# Patient Record
Sex: FEMALE | Race: BLACK OR AFRICAN AMERICAN | NOT HISPANIC OR LATINO | Employment: OTHER | URBAN - METROPOLITAN AREA
[De-identification: names, ages, dates, MRNs, and addresses within clinical notes are randomized per-mention and may not be internally consistent; named-entity substitution may affect disease eponyms.]

---

## 2017-01-27 ENCOUNTER — CLINICAL SUPPORT (OUTPATIENT)
Dept: INTERNAL MEDICINE | Facility: CLINIC | Age: 82
End: 2017-01-27
Payer: MEDICARE

## 2017-01-27 PROCEDURE — 96372 THER/PROPH/DIAG INJ SC/IM: CPT | Mod: PBBFAC

## 2017-01-27 RX ADMIN — CYANOCOBALAMIN 1000 MCG: 1000 INJECTION, SOLUTION INTRAMUSCULAR; SUBCUTANEOUS at 04:01

## 2017-04-06 ENCOUNTER — LAB VISIT (OUTPATIENT)
Dept: LAB | Facility: HOSPITAL | Age: 82
End: 2017-04-06
Attending: INTERNAL MEDICINE
Payer: MEDICARE

## 2017-04-06 ENCOUNTER — OFFICE VISIT (OUTPATIENT)
Dept: INTERNAL MEDICINE | Facility: CLINIC | Age: 82
End: 2017-04-06
Payer: MEDICARE

## 2017-04-06 VITALS
DIASTOLIC BLOOD PRESSURE: 70 MMHG | WEIGHT: 183 LBS | BODY MASS INDEX: 31.24 KG/M2 | HEART RATE: 66 BPM | HEIGHT: 64 IN | SYSTOLIC BLOOD PRESSURE: 122 MMHG | OXYGEN SATURATION: 95 %

## 2017-04-06 DIAGNOSIS — Z12.31 OTHER SCREENING MAMMOGRAM: ICD-10-CM

## 2017-04-06 DIAGNOSIS — I10 ESSENTIAL HYPERTENSION: ICD-10-CM

## 2017-04-06 DIAGNOSIS — E55.9 VITAMIN D DEFICIENCY DISEASE: ICD-10-CM

## 2017-04-06 DIAGNOSIS — M89.9 BONE DISORDER: ICD-10-CM

## 2017-04-06 DIAGNOSIS — E53.8 VITAMIN B12 DEFICIENCY: ICD-10-CM

## 2017-04-06 DIAGNOSIS — E53.8 VITAMIN B12 DEFICIENCY: Primary | ICD-10-CM

## 2017-04-06 DIAGNOSIS — M85.80 OSTEOPENIA, UNSPECIFIED LOCATION: ICD-10-CM

## 2017-04-06 DIAGNOSIS — E78.5 HYPERLIPIDEMIA, UNSPECIFIED HYPERLIPIDEMIA TYPE: ICD-10-CM

## 2017-04-06 DIAGNOSIS — E03.9 HYPOTHYROIDISM, UNSPECIFIED TYPE: ICD-10-CM

## 2017-04-06 DIAGNOSIS — J44.9 CHRONIC OBSTRUCTIVE PULMONARY DISEASE, UNSPECIFIED COPD TYPE: ICD-10-CM

## 2017-04-06 LAB
25(OH)D3+25(OH)D2 SERPL-MCNC: 36 NG/ML
ALBUMIN SERPL BCP-MCNC: 3.5 G/DL
ALP SERPL-CCNC: 85 U/L
ALT SERPL W/O P-5'-P-CCNC: 7 U/L
ANION GAP SERPL CALC-SCNC: 10 MMOL/L
AST SERPL-CCNC: 14 U/L
BASOPHILS # BLD AUTO: 0.03 K/UL
BASOPHILS NFR BLD: 0.8 %
BILIRUB SERPL-MCNC: 1.1 MG/DL
BUN SERPL-MCNC: 12 MG/DL
CALCIUM SERPL-MCNC: 10.1 MG/DL
CHLORIDE SERPL-SCNC: 102 MMOL/L
CHOLEST/HDLC SERPL: 3.8 {RATIO}
CO2 SERPL-SCNC: 32 MMOL/L
CREAT SERPL-MCNC: 1.1 MG/DL
DIFFERENTIAL METHOD: ABNORMAL
EOSINOPHIL # BLD AUTO: 0.2 K/UL
EOSINOPHIL NFR BLD: 4.1 %
ERYTHROCYTE [DISTWIDTH] IN BLOOD BY AUTOMATED COUNT: 14.4 %
EST. GFR  (AFRICAN AMERICAN): 55.2 ML/MIN/1.73 M^2
EST. GFR  (NON AFRICAN AMERICAN): 47.9 ML/MIN/1.73 M^2
GLUCOSE SERPL-MCNC: 80 MG/DL
HCT VFR BLD AUTO: 38.9 %
HDL/CHOLESTEROL RATIO: 26.4 %
HDLC SERPL-MCNC: 288 MG/DL
HDLC SERPL-MCNC: 76 MG/DL
HGB BLD-MCNC: 12.4 G/DL
LDLC SERPL CALC-MCNC: 186.2 MG/DL
LYMPHOCYTES # BLD AUTO: 1.4 K/UL
LYMPHOCYTES NFR BLD: 39.2 %
MCH RBC QN AUTO: 29.2 PG
MCHC RBC AUTO-ENTMCNC: 31.9 %
MCV RBC AUTO: 92 FL
MONOCYTES # BLD AUTO: 0.3 K/UL
MONOCYTES NFR BLD: 9 %
NEUTROPHILS # BLD AUTO: 1.7 K/UL
NEUTROPHILS NFR BLD: 46.6 %
NONHDLC SERPL-MCNC: 212 MG/DL
PLATELET # BLD AUTO: 223 K/UL
PMV BLD AUTO: 9.2 FL
POTASSIUM SERPL-SCNC: 4.1 MMOL/L
PROT SERPL-MCNC: 7.8 G/DL
RBC # BLD AUTO: 4.25 M/UL
SODIUM SERPL-SCNC: 144 MMOL/L
T4 FREE SERPL-MCNC: 0.84 NG/DL
TRIGL SERPL-MCNC: 129 MG/DL
TSH SERPL DL<=0.005 MIU/L-ACNC: 49.25 UIU/ML
VIT B12 SERPL-MCNC: >2000 PG/ML
WBC # BLD AUTO: 3.65 K/UL

## 2017-04-06 PROCEDURE — 85025 COMPLETE CBC W/AUTO DIFF WBC: CPT

## 2017-04-06 PROCEDURE — 80061 LIPID PANEL: CPT

## 2017-04-06 PROCEDURE — 99214 OFFICE O/P EST MOD 30 MIN: CPT | Mod: S$PBB,,, | Performed by: INTERNAL MEDICINE

## 2017-04-06 PROCEDURE — 99999 PR PBB SHADOW E&M-EST. PATIENT-LVL III: CPT | Mod: PBBFAC,,, | Performed by: INTERNAL MEDICINE

## 2017-04-06 PROCEDURE — 84439 ASSAY OF FREE THYROXINE: CPT

## 2017-04-06 PROCEDURE — 82306 VITAMIN D 25 HYDROXY: CPT

## 2017-04-06 PROCEDURE — 80053 COMPREHEN METABOLIC PANEL: CPT

## 2017-04-06 PROCEDURE — 82607 VITAMIN B-12: CPT

## 2017-04-06 PROCEDURE — 36415 COLL VENOUS BLD VENIPUNCTURE: CPT

## 2017-04-06 PROCEDURE — 84443 ASSAY THYROID STIM HORMONE: CPT

## 2017-04-06 RX ORDER — CYANOCOBALAMIN 1000 UG/ML
1000 INJECTION, SOLUTION INTRAMUSCULAR; SUBCUTANEOUS
Status: DISCONTINUED | OUTPATIENT
Start: 2017-04-06 | End: 2017-09-07 | Stop reason: ALTCHOICE

## 2017-04-06 RX ADMIN — CYANOCOBALAMIN 1000 MCG: 1000 INJECTION, SOLUTION INTRAMUSCULAR at 02:04

## 2017-04-06 NOTE — MR AVS SNAPSHOT
[facility]  [address]  [place] [address]  Phone: [phone]  Fax: [phone]                  Michelle Campo   2017 1:00 PM   Office Visit    Description:  Female : 1935   Provider:  Lien Griffith MD   Department:  [facility]           Reason for Visit     Follow-up           Diagnoses this Visit        Comments    Vitamin B12 deficiency    -  Primary     Vitamin D deficiency disease         Essential hypertension         Other screening mammogram         Osteopenia, unspecified location         Bone disorder                To Do List           Future Appointments        Provider Department Dept Phone    2017 1:00 PM PERIMETRY, HUMPH [facility] [phone]    2017 1:15 PM Brenda Ceja MD [facility] [phone]    2017 1:40 PM NOMC, DEXA1 [facility] [phone]    2017 2:30 PM LAB, APPOINTMENT [place] [facility] [phone]    2017 1:30 PM Lien Griffith MD [facility] [phone]      Goals (5 Years of Data)     None      [facility]     [facility] Nurse Care [facility] -  Assistance  Unless otherwise directed by your provider, please contact [facility], our nurse care line that is available for  assistance.     Registered nurses in the [facility] Center provide: appointment scheduling, clinical advisement, health education, and other advisory services.  Call: [phone] (toll free)               Medications           Message regarding Medications     Verify the changes and/or additions to your medication regime listed below are the same as discussed with your clinician today.  If any of these changes or additions are incorrect, please notify your healthcare provider.        These medications were administered today        Dose Freq    cyanocobalamin injection 1,000 mcg 1,000 mcg Every 30  "days    Sig: Inject 1 mL (1,000 mcg total) into the muscle every 30 days.    Class: Normal    Route: Intramuscular      STOP taking these medications     cyanocobalamin injection 1,000 mcg            Verify that the below list of medications is an accurate representation of the medications you are currently taking.  If none reported, the list may be blank. If incorrect, please contact your healthcare provider. Carry this list with you in case of emergency.           Current Medications     acetaminophen (TYLENOL EXTRA STRENGTH) 500 MG tablet Take 500 mg by mouth every 6 (six) hours as needed.    brimonidine-timolol (COMBIGAN) 0.2-0.5 % Drop Place 1 drop into both eyes 2 (two) times daily. 1 month supply = 10 ml bottle. This replaces the timolol drops    brinzolamide (AZOPT) 1 % ophthalmic suspension Place 1 drop into both eyes 3 (three) times daily.    cholecalciferol, vitamin D3, (VITAMIN D3) 2,000 unit Tab Take 1 tablet (2,000 Units total) by mouth once daily.    donepezil (ARICEPT) 10 MG tablet Take 1 tablet (10 mg total) by mouth once daily.    latanoprost 0.005 % ophthalmic solution Place 1 drop into both eyes every evening.    levothyroxine (SYNTHROID) 88 MCG tablet Take 1 tablet (88 mcg total) by mouth once daily.    lisinopril-hydrochlorothiazide (PRINZIDE,ZESTORETIC) 20-25 mg Tab Take 1 tablet by mouth once daily.    pravastatin (PRAVACHOL) 20 MG tablet Take 1 tablet (20 mg total) by mouth once daily.           Clinical Reference Information           Your Vitals Were     BP Pulse Height Weight SpO2 BMI    122/70 66 5' 4" (1.626 m) 83 kg (182 lb 15.7 oz) 95% 31.41 kg/m2      Blood Pressure          Most Recent Value    BP  122/70      Allergies as of 4/6/2017     Pcn [Penicillins]      Immunizations Administered on Date of Encounter - 4/6/2017     None      Orders Placed During Today's Visit     Future Labs/Procedures Expected by Expires    DXA Bone Density Spine And Hip  4/6/2017 4/6/2018    Mammo Digital " Screening Bilat with CAD  4/6/2017 6/6/2018    Vitamin B12  4/6/2017 6/5/2018    CBC auto differential  As directed 4/6/2018    CBC auto differential  As directed 4/6/2018    Comprehensive metabolic panel  As directed 4/6/2018    Comprehensive metabolic panel  As directed 4/6/2018    Lipid panel  As directed 4/6/2018    TSH  As directed 4/6/2018    TSH  As directed 4/6/2018    Vitamin D  As directed 4/6/2018      Language Assistance Services     ATTENTION: Language assistance services are available, free of charge. Please call [phone].      ATENCIÓN: Si habla español, tiene a dubois disposición servicios gratuitos de asistencia lingüística. Llame al [phone].     CHÚ Ý: N?u b?n nói Ti?ng Vi?t, có các d?ch v? h? tr? ngôn ng? mi?n phí dành cho b?n. G?i s? [phone].         [facility] complies with applicable Federal civil rights laws and does not discriminate on the basis of race, color, national origin, age, disability, or sex.

## 2017-04-06 NOTE — PROGRESS NOTES
"CHIEF COMPLAINT: Follow up of hypertension, hyperlipidemia, osteoporosis, and hypothyroidism.     HISTORY OF PRESENT ILLNESS: This is a 81-year-old woman who presents with her daughter for above. She has been doing well. She is taking lisinopril HCT 20/25 one tablet daily. She denies any chest pain, shortness of breath, dizziness, blurred vision, headache, joint pain or muscle pain from the pravastatin 20 mg daily. She has been on levothyroxine 88 mcg daily. She denies any fatigue. She denies any fever, chills, nausea, vomiting, constipation, diarrhea. Vision is good. Appetite has been good. She denies any dysuria, hematuria, anxiety, depression, insomnia. Memory is ok Aricept 10 mg daily. HEr daughter states she has some memory loss which is stable on the aricept. She is also taking Vitamin D 2000 units daily. She has all of her medication bottles with her today    PAST MEDICAL HISTORY:   1. Hypertension.   2. Hyperlipidemia.   3. Status post stroke November 2001, hospitalized at [facility], had left facial drop and left-sided weakness which have since resolved.   4. COPD.   5. Spontaneous right pneumothorax April 2007, resolved with chest tube.   6. Shingles in 2007.   7. History of elevated blood sugars.   8. Glaucoma, followed by Dr. Rees.   9. Hyperthyroidism, status post radioactive iodine 10/2/2008.   10. Reflux.     PAST SURGICAL HISTORY: Hysterectomy. She had her ovaries removed.     MEDICATIONS AND ALLERGIES: Updated in epic     PHYSICAL EXAMINATION:      /70  Pulse 66  Ht 5' 4" (1.626 m)  Wt 83 kg (182 lb 15.7 oz)  SpO2 95%  BMI 31.41 kg/m2    GENERAL: Alert, oriented. No apparent distress. Affect within normal   limits.   CONJUNCTIVAE: Anicteric.   TYMPANIC MEMBRANES: Clear.   OROPHARYNX: Clear.   NECK: Supple.   RESPIRATORY: Effort normal.   LUNGS: Clear to auscultation.   HEART: Regular rate and rhythm without murmurs, gallops, or rubs.   No lower extremity edema.         ASSESSMENT AND PLAN: "   1. Hypertension-stable  2. Hyperlipidemia-on pravastatin  3. Hypothyroidism-on synthroid  4. Osteopenia- 3/2013 - due 2017  5. History of stroke-modifying risk factors.   6. History of reflux-asymptomatic.   7. Glaucoma-to ophtho  8. Screening mammogram 7/2016. Bone density 3/2013 due 2017. Colonscopy 9/10 - diverticulosis.   9. History of right-sided pneumothorax-chest x-ray 2/12 ok.  10.Mild anemia -stable   11. Memory issues - better on Aricept  12. COPD - asymptomatic  13. Vitamin D deficiency - vitamin D 2000 units daily  14. History of diabetes - a1c stable      Prevnar 7/16    I will see her back in 3 months , sooner if problems arise.

## 2017-04-17 ENCOUNTER — TELEPHONE (OUTPATIENT)
Dept: INTERNAL MEDICINE | Facility: CLINIC | Age: 82
End: 2017-04-17

## 2017-04-17 NOTE — TELEPHONE ENCOUNTER
Pt notified of results and PCP recommendations, Called daughter and left Vm requesting a return call:    Please notify family that her thyroid function and choleserol levels are high. Family needs to make sure she is taking her medicaiton regularly   SF

## 2017-04-18 ENCOUNTER — CLINICAL SUPPORT (OUTPATIENT)
Dept: OPHTHALMOLOGY | Facility: CLINIC | Age: 82
End: 2017-04-18
Attending: OPHTHALMOLOGY
Payer: MEDICARE

## 2017-04-18 DIAGNOSIS — H52.7 REFRACTION ERROR: ICD-10-CM

## 2017-04-18 DIAGNOSIS — H52.4 PRESBYOPIA: ICD-10-CM

## 2017-04-18 DIAGNOSIS — H40.1133 PRIMARY OPEN ANGLE GLAUCOMA OF BOTH EYES, SEVERE STAGE: Primary | ICD-10-CM

## 2017-04-18 DIAGNOSIS — H40.1133 PRIMARY OPEN ANGLE GLAUCOMA OF BOTH EYES, SEVERE STAGE: ICD-10-CM

## 2017-04-18 DIAGNOSIS — Z96.1 PSEUDOPHAKIA: ICD-10-CM

## 2017-04-18 DIAGNOSIS — H04.123 DRY EYES, BILATERAL: ICD-10-CM

## 2017-04-18 PROCEDURE — 99999 PR PBB SHADOW E&M-EST. PATIENT-LVL II: CPT | Mod: PBBFAC,,, | Performed by: OPHTHALMOLOGY

## 2017-04-18 PROCEDURE — 92134 CPTRZ OPH DX IMG PST SGM RTA: CPT | Mod: 26,S$PBB,, | Performed by: OPHTHALMOLOGY

## 2017-04-18 PROCEDURE — 92012 INTRM OPH EXAM EST PATIENT: CPT | Mod: S$PBB,,, | Performed by: OPHTHALMOLOGY

## 2017-04-18 PROCEDURE — 99212 OFFICE O/P EST SF 10 MIN: CPT | Mod: PBBFAC | Performed by: OPHTHALMOLOGY

## 2017-04-18 RX ORDER — LATANOPROST 50 UG/ML
1 SOLUTION/ DROPS OPHTHALMIC NIGHTLY
Qty: 1 BOTTLE | Refills: 12 | Status: SHIPPED | OUTPATIENT
Start: 2017-04-18 | End: 2018-03-20 | Stop reason: SDUPTHER

## 2017-04-18 RX ORDER — BRIMONIDINE TARTRATE AND TIMOLOL MALEATE 2; 5 MG/ML; MG/ML
1 SOLUTION OPHTHALMIC 2 TIMES DAILY
Qty: 10 ML | Refills: 12 | Status: SHIPPED | OUTPATIENT
Start: 2017-04-18 | End: 2018-03-20 | Stop reason: SDUPTHER

## 2017-04-18 NOTE — PROGRESS NOTES
Assessment /Plan     For exam results, see Encounter Report.    Primary open angle glaucoma of both eyes, severe stage    Dry eyes, bilateral    Refraction error    Presbyopia - Both Eyes    Pseudophakia - Both Eyes      LOST TO F/U X 2 YEARS 8/2014 TO 8/2016     1.   POAG OD > OS - severe stage   Pt Dx with glaucoma by Dr. Carias   First HVF 2010   First photos 2010   First [facility] visit 2010   H/O poor compliance lost to F/U 12/2010 to 3/2013           Family history    neg        Glaucoma meds    Timolol ou, xalatan ou        H/O adverse rxn to glaucoma drops    none        LASERS    none        GLAUCOMA SURGERIES    none        OTHER EYE SURGERIES    PC IOL - sulcus OD // bag OS - Crear        CDR    0.95 w/ sup and inf loss /0.8 with inf loss         Tbase    20/20        Tmax    20/20        Ttarget    16/16           HVF    3 test 2010 to 2016 -  gen dep - SAD/IAD od. / gen dep SALT os ++ progression OU        Gonio    +3 ou        CCT    502/501 (thin ou)        OCT    3 test 2010 to 2016  - RNFL - dec I od / nl os        HRT    2 test 2013 to 2017 - [place] off od // dec inf os /// CDR xx [place] off od //0.72 os        Disc photos    2010. 2016  - OIS     - Ttoday  18/17  - Test done today  HRT/Gonio    2.    NAOMI - SPK ou   AT's prn    3.   PC IOL ou - outside doctor - Crear   Sulcus od   Bag os    4.   HTN - ? Hx of retinopathy. BP control     5.   Myopia / presbyopia (monovision IOL's)   Glasses - Martinez - 3/13/2013   -3.00 od and plano os    PLAN:  Mild improvement in IOP w/ restarting gtts <1 month ago  IOP too high for CDR and VF loss and baseline IOP of 20  Changed combigan ou- re-start - ran out   Cont latanoprost ou   Cont Azopt  TID OU     Again encouraged compliance for glaucoma management and treatment  Consider SLT OU in future    RTC: 2-3 mos gonio and  IOP check back on combigan // if IOP still higher than ideal - consider SLT ou    I have seen and personally examined the patient.  I  agree with the findings, assessment and plan of the resident and/or fellow.     Brenda Ceja MD

## 2017-04-18 NOTE — TELEPHONE ENCOUNTER
Let daughter know, she is to continue levothyroxine 88 mcg one tablet daily, Except twice a week she is to take 1.5 tablets

## 2017-04-20 ENCOUNTER — TELEPHONE (OUTPATIENT)
Dept: INTERNAL MEDICINE | Facility: CLINIC | Age: 82
End: 2017-04-20

## 2017-07-02 RX ORDER — LEVOTHYROXINE SODIUM 88 UG/1
TABLET ORAL
Qty: 90 TABLET | Refills: 0 | Status: SHIPPED | OUTPATIENT
Start: 2017-07-02 | End: 2017-08-04 | Stop reason: SDUPTHER

## 2017-07-02 RX ORDER — LISINOPRIL AND HYDROCHLOROTHIAZIDE 20; 25 MG/1; MG/1
TABLET ORAL
Qty: 90 TABLET | Refills: 0 | Status: SHIPPED | OUTPATIENT
Start: 2017-07-02 | End: 2017-08-04 | Stop reason: SDUPTHER

## 2017-07-18 ENCOUNTER — OFFICE VISIT (OUTPATIENT)
Dept: OPHTHALMOLOGY | Facility: CLINIC | Age: 82
End: 2017-07-18
Payer: MEDICARE

## 2017-07-18 DIAGNOSIS — Z96.1 PSEUDOPHAKIA: ICD-10-CM

## 2017-07-18 DIAGNOSIS — I10 ESSENTIAL HYPERTENSION: ICD-10-CM

## 2017-07-18 DIAGNOSIS — H52.4 PRESBYOPIA: ICD-10-CM

## 2017-07-18 DIAGNOSIS — H04.123 DRY EYES, BILATERAL: ICD-10-CM

## 2017-07-18 DIAGNOSIS — H40.1133 PRIMARY OPEN ANGLE GLAUCOMA OF BOTH EYES, SEVERE STAGE: Primary | ICD-10-CM

## 2017-07-18 PROCEDURE — 92012 INTRM OPH EXAM EST PATIENT: CPT | Mod: S$PBB,,, | Performed by: OPHTHALMOLOGY

## 2017-07-18 PROCEDURE — 99211 OFF/OP EST MAY X REQ PHY/QHP: CPT | Mod: PBBFAC | Performed by: OPHTHALMOLOGY

## 2017-07-18 PROCEDURE — 99999 PR PBB SHADOW E&M-EST. PATIENT-LVL I: CPT | Mod: PBBFAC,,, | Performed by: OPHTHALMOLOGY

## 2017-07-18 NOTE — PROGRESS NOTES
HPI     DLS: 4/18/17    Pt here for 3 month check;  Pt states OD runs water a lot.     Meds: Brimonidine bid ou            Azopt tid ou            Latanoprost qhs ou    1. Primary open angle glaucoma of both eyes, severe stage  2. Dry eyes, bilateral  3. Refraction error  4. Presbyopia, both eyes  5. Pseudophakia, both eyes     Last edited by Nancy De Leon on 7/18/2017  1:09 PM. (History)            Assessment /Plan     For exam results, see Encounter Report.    Primary open angle glaucoma of both eyes, severe stage    Dry eyes, bilateral    Essential hypertension    Presbyopia - Both Eyes    Pseudophakia - Both Eyes        LOST TO F/U X 2 YEARS 8/2014 TO 8/2016     1.   POAG OD > OS - severe stage   Pt Dx with glaucoma by Dr. Carias   First HVF 2010   First photos 2010   First [facility] visit 2010   H/O poor compliance lost to F/U 12/2010 to 3/2013           Family history    neg        Glaucoma meds    Timolol ou, xalatan ou        H/O adverse rxn to glaucoma drops    none        LASERS    none        GLAUCOMA SURGERIES    none        OTHER EYE SURGERIES    PC IOL - sulcus OD // bag OS - Crear        CDR    0.95 w/ sup and inf loss /0.8 with inf loss         Tbase    20/20        Tmax    20/20        Ttarget    16/16           HVF    3 test 2010 to 2016 -  gen dep - SAD/IAD od. / gen dep SALT os ++ progression OU        Gonio    +3 ou        CCT    502/501 (thin ou)        OCT    3 test 2010 to 2016  - RNFL - dec I od / nl os        HRT    2 test 2013 to 2017 - [place] off od // dec inf os /// CDR xx [place] off od //0.72 os        Disc photos    2010. 2016  - OIS     - Ttoday  19/19  - Test done today - Gonio    2.    NAOMI - SPK ou   AT's prn    3.   PC IOL ou - outside doctor - Crear   Sulcus od   Bag os    4.   HTN - ? Hx of retinopathy. BP control     5.   Myopia / presbyopia (monovision IOL's)   Glasses - Martinez - 3/13/2013   -3.00 od and plano os    PLAN:  Mild improvement in IOP w/ restarting gtts <1 month  ago  IOP too high for CDR and VF loss and baseline IOP of 20  Changed combigan ou- re-start - ran out   Cont latanoprost ou   Cont Azopt  TID OU     Again encouraged compliance for glaucoma management and treatment  Consider SLT OU in future    RTC:  consider SLT OD then OS     I have seen and personally examined the patient.  I agree with the findings, assessment and plan of the resident and/or fellow.     Brenda Ceja MD

## 2017-07-28 ENCOUNTER — HOSPITAL ENCOUNTER (OUTPATIENT)
Dept: RADIOLOGY | Facility: CLINIC | Age: 82
Discharge: HOME | End: 2017-07-28
Attending: INTERNAL MEDICINE
Payer: MEDICARE

## 2017-07-28 DIAGNOSIS — M89.9 BONE DISORDER: ICD-10-CM

## 2017-07-28 PROCEDURE — 77080 DXA BONE DENSITY AXIAL: CPT | Mod: TC

## 2017-07-28 PROCEDURE — 77080 DXA BONE DENSITY AXIAL: CPT | Mod: 26,,, | Performed by: INTERNAL MEDICINE

## 2017-08-04 ENCOUNTER — HOSPITAL ENCOUNTER (OUTPATIENT)
Dept: RADIOLOGY | Facility: HOSPITAL | Age: 82
Discharge: HOME | End: 2017-08-04
Attending: INTERNAL MEDICINE
Payer: MEDICARE

## 2017-08-04 ENCOUNTER — OFFICE VISIT (OUTPATIENT)
Dept: INTERNAL MEDICINE | Facility: CLINIC | Age: 82
End: 2017-08-04
Payer: MEDICARE

## 2017-08-04 VITALS
HEART RATE: 80 BPM | HEIGHT: 64 IN | DIASTOLIC BLOOD PRESSURE: 78 MMHG | SYSTOLIC BLOOD PRESSURE: 139 MMHG | BODY MASS INDEX: 30.61 KG/M2 | WEIGHT: 179.31 LBS

## 2017-08-04 DIAGNOSIS — M85.80 OSTEOPENIA, UNSPECIFIED LOCATION: ICD-10-CM

## 2017-08-04 DIAGNOSIS — Z12.31 OTHER SCREENING MAMMOGRAM: ICD-10-CM

## 2017-08-04 DIAGNOSIS — E78.5 HYPERLIPIDEMIA, UNSPECIFIED HYPERLIPIDEMIA TYPE: ICD-10-CM

## 2017-08-04 DIAGNOSIS — E03.9 HYPOTHYROIDISM, UNSPECIFIED TYPE: Primary | ICD-10-CM

## 2017-08-04 DIAGNOSIS — I10 ESSENTIAL HYPERTENSION: ICD-10-CM

## 2017-08-04 PROCEDURE — 77067 SCR MAMMO BI INCL CAD: CPT | Mod: TC

## 2017-08-04 PROCEDURE — 3008F BODY MASS INDEX DOCD: CPT | Mod: ,,, | Performed by: INTERNAL MEDICINE

## 2017-08-04 PROCEDURE — 1159F MED LIST DOCD IN RCRD: CPT | Mod: ,,, | Performed by: INTERNAL MEDICINE

## 2017-08-04 PROCEDURE — 77067 SCR MAMMO BI INCL CAD: CPT | Mod: 26,,, | Performed by: RADIOLOGY

## 2017-08-04 PROCEDURE — 1126F AMNT PAIN NOTED NONE PRSNT: CPT | Mod: ,,, | Performed by: INTERNAL MEDICINE

## 2017-08-04 PROCEDURE — 99999 PR PBB SHADOW E&M-EST. PATIENT-LVL III: CPT | Mod: PBBFAC,,, | Performed by: INTERNAL MEDICINE

## 2017-08-04 PROCEDURE — 99214 OFFICE O/P EST MOD 30 MIN: CPT | Mod: S$PBB,,, | Performed by: INTERNAL MEDICINE

## 2017-08-04 RX ORDER — LEVOTHYROXINE SODIUM 100 UG/1
100 TABLET ORAL DAILY
Qty: 90 TABLET | Refills: 4 | Status: SHIPPED | OUTPATIENT
Start: 2017-08-04 | End: 2017-12-06 | Stop reason: SDUPTHER

## 2017-08-04 RX ORDER — LISINOPRIL AND HYDROCHLOROTHIAZIDE 20; 25 MG/1; MG/1
1 TABLET ORAL DAILY
Qty: 90 TABLET | Refills: 0 | Status: SHIPPED | OUTPATIENT
Start: 2017-08-04 | End: 2018-05-17 | Stop reason: SDUPTHER

## 2017-08-04 RX ORDER — PRAVASTATIN SODIUM 20 MG/1
20 TABLET ORAL DAILY
Qty: 90 TABLET | Refills: 4 | Status: SHIPPED | OUTPATIENT
Start: 2017-08-04 | End: 2018-10-15 | Stop reason: SDUPTHER

## 2017-08-04 RX ORDER — DONEPEZIL HYDROCHLORIDE 10 MG/1
10 TABLET, FILM COATED ORAL DAILY
Qty: 90 TABLET | Refills: 4 | Status: SHIPPED | OUTPATIENT
Start: 2017-08-04 | End: 2018-10-31 | Stop reason: SDUPTHER

## 2017-08-04 NOTE — PROGRESS NOTES
"CHIEF COMPLAINT: Follow up of hypertension, hyperlipidemia, osteoporosis, and hypothyroidism.     HISTORY OF PRESENT ILLNESS: This is a 82-year-old woman who presents with her daughter for above. She has been doing well. She is taking lisinopril HCT 20/25 one tablet daily. She denies any chest pain, shortness of breath, dizziness, blurred vision, headache, joint pain or muscle pain from the pravastatin 20 mg daily. She has been on levothyroxine 88 mcg daily. She denies any fatigue. She denies any fever, chills, nausea, vomiting, constipation, diarrhea. Vision is good. Appetite has been good. She denies any dysuria, hematuria, anxiety, depression, insomnia. Memory is ok Aricept 10 mg daily. HEr daughter states she has some memory loss which is stable on the aricept. She is also taking Vitamin D 2000 units daily. She has all of her medication bottles with her today    PAST MEDICAL HISTORY:   1. Hypertension.   2. Hyperlipidemia.   3. Status post stroke November 2001, hospitalized at [facility], had left facial drop and left-sided weakness which have since resolved.   4. COPD.   5. Spontaneous right pneumothorax April 2007, resolved with chest tube.   6. Shingles in 2007.   7. History of elevated blood sugars.   8. Glaucoma, followed by Dr. Rees.   9. Hyperthyroidism, status post radioactive iodine 10/2/2008.   10. Reflux.     PAST SURGICAL HISTORY: Hysterectomy. She had her ovaries removed.     MEDICATIONS AND ALLERGIES: Updated in epic     PHYSICAL EXAMINATION:     /78 (BP Location: Right arm, Patient Position: Sitting, BP Method: Manual)   Pulse 80   Ht 5' 4" (1.626 m)   Wt 81.3 kg (179 lb 4.8 oz)   BMI 30.78 kg/m²   GENERAL: Alert, oriented. No apparent distress. Affect within normal   limits.   CONJUNCTIVAE: Anicteric.   TYMPANIC MEMBRANES: Clear.   OROPHARYNX: Clear.   NECK: Supple.   RESPIRATORY: Effort normal.   LUNGS: Clear to auscultation.   HEART: Regular rate and rhythm without murmurs, gallops, or " rubs.   No lower extremity edema.    ABDOMEN: soft, non distended, non tender, bowel sounds present, no hepatosplenomgaly  BREAST: well healed keloid scar on right breast, no nodules palpated, no axillary lad       Labs 7/17 reviewed      ASSESSMENT AND PLAN:   1. Hypertension-stable  2. Hyperlipidemia-on pravastatin  3. Hypothyroidism-increase synthroid to 100 mcg daily  4. Osteopenia- done 7/ 2017  5. History of stroke-modifying risk factors.   6. History of reflux-asymptomatic.   7. Glaucoma-on drops  8. Screening mammogram 7/2016 - today. Bone density 3/2013 due 2017. Colonscopy 9/10 - diverticulosis.   9. History of right-sided pneumothorax-chest x-ray 2/12 ok.  10.Mild anemia -stable   11. Memory issues - better on Aricept  12. COPD - asymptomatic  13. Vitamin D deficiency - vitamin D 2000 units daily  14. History of diabetes - a1c stable      Prevnar 7/16    I will see her back in 4 months , sooner if problems arise.

## 2017-08-17 ENCOUNTER — OFFICE VISIT (OUTPATIENT)
Dept: OPHTHALMOLOGY | Facility: CLINIC | Age: 82
End: 2017-08-17
Payer: MEDICARE

## 2017-08-17 VITALS — HEART RATE: 77 BPM | SYSTOLIC BLOOD PRESSURE: 166 MMHG | DIASTOLIC BLOOD PRESSURE: 84 MMHG

## 2017-08-17 DIAGNOSIS — H04.123 DRY EYES, BILATERAL: ICD-10-CM

## 2017-08-17 DIAGNOSIS — Z96.1 PSEUDOPHAKIA: ICD-10-CM

## 2017-08-17 DIAGNOSIS — I10 ESSENTIAL HYPERTENSION: ICD-10-CM

## 2017-08-17 DIAGNOSIS — H40.1133 PRIMARY OPEN ANGLE GLAUCOMA OF BOTH EYES, SEVERE STAGE: Primary | ICD-10-CM

## 2017-08-17 DIAGNOSIS — H52.4 PRESBYOPIA: ICD-10-CM

## 2017-08-17 PROCEDURE — 99999 PR PBB SHADOW E&M-EST. PATIENT-LVL II: CPT | Mod: PBBFAC,,, | Performed by: OPHTHALMOLOGY

## 2017-08-17 PROCEDURE — 99212 OFFICE O/P EST SF 10 MIN: CPT | Mod: PBBFAC | Performed by: OPHTHALMOLOGY

## 2017-08-17 PROCEDURE — 99499 UNLISTED E&M SERVICE: CPT | Mod: S$PBB,,, | Performed by: OPHTHALMOLOGY

## 2017-08-17 PROCEDURE — 65855 TRABECULOPLASTY LASER SURG: CPT | Mod: PBBFAC,RT | Performed by: OPHTHALMOLOGY

## 2017-08-17 NOTE — PROGRESS NOTES
HPI     Laser Treatment    Additional comments: laser pi od           Comments   LASER PI OD TODAY (argon then yag)    1. POAG OD>OS  2. NAOMI  3. PCIOL OU  4. HTN Retinopathy  5. Myopia    MEDS:  Combigan BID OU  Azopt TID OU  Latanoprost QHS OU       Last edited by Malika Levi MA on 8/17/2017 10:39 AM. (History)            Assessment /Plan     For exam results, see Encounter Report.    There are no diagnoses linked to this encounter.  HPI     Laser Treatment    Additional comments: laser pi od           Comments   LASER PI OD TODAY (argon then yag)    1. POAG OD>OS  2. NAOMI  3. PCIOL OU  4. HTN Retinopathy  5. Myopia    MEDS:  Combigan BID OU  Azopt TID OU  Latanoprost QHS OU       Last edited by Malika Levi MA on 8/17/2017 10:39 AM. (History)            Assessment /Plan     For exam results, see Encounter Report.    There are no diagnoses linked to this encounter.    LOST TO F/U X 2 YEARS 8/2014 TO 8/2016     1.   POAG OD > OS - severe stage   Pt Dx with glaucoma by Dr. Carias   First F 2010   First photos 2010   First [facility] eye visit 2010   H/O poor compliance lost to F/U 12/2010 to 3/2013           Family history    neg        Glaucoma meds    Timolol ou, xalatan ou        H/O adverse rxn to glaucoma drops    none        LASERS    none        GLAUCOMA SURGERIES    none        OTHER EYE SURGERIES    PC IOL - sulcus OD // bag OS - [place]        CDR    0.95 w/ sup and inf loss /0.8 with inf loss         Tbase    20/20        Tmax    20/20        Ttarget    16/16           HVF    3 test 2010 to 2016 -  gen dep - SAD/IAD od. / gen dep SALT os ++ progression OU        Gonio    +3 ou        CCT    502/501 (thin ou)        OCT    3 test 2010 to 2016  - RNFL - dec I od / nl os        HRT    2 test 2013 to 2017 - [place] off od // dec inf os /// CDR xx [place] off od //0.72 os        Disc photos    2010. 2016  - OIS     - Ttoday  18/17  - Test done today - SLT OD    2.    NAOMI - SPK ou   AT's prn    3.   PC IOL ou -  outside doctor - Crear   Sulcus od   Bag os    4.   HTN - ? Hx of retinopathy. BP control     5.   Myopia / presbyopia (monovision IOL's)   Glasses - Martinez - 3/13/2013   -3.00 od and plano os    PLAN:  Mild improvement in IOP w/ restarting gtts <1 month ago  IOP too high for CDR and VF loss and baseline IOP of 20  Changed combigan ou- re-start - ran out   Cont latanoprost ou   Cont Azopt  TID OU     Again encouraged compliance for glaucoma management and treatment      SLT OD 8/17/2017 - steroid taper - Pred Forte    -  SLT OS - pending -           I have seen and personally examined the patient.  I agree with the findings, assessment and plan of the resident and/or fellow.     Brenda Ceja MD

## 2017-09-07 ENCOUNTER — OFFICE VISIT (OUTPATIENT)
Dept: OPHTHALMOLOGY | Facility: CLINIC | Age: 82
End: 2017-09-07
Payer: MEDICARE

## 2017-09-07 VITALS — DIASTOLIC BLOOD PRESSURE: 77 MMHG | SYSTOLIC BLOOD PRESSURE: 129 MMHG | HEART RATE: 82 BPM

## 2017-09-07 DIAGNOSIS — H40.1133 PRIMARY OPEN ANGLE GLAUCOMA OF BOTH EYES, SEVERE STAGE: Primary | ICD-10-CM

## 2017-09-07 DIAGNOSIS — Z96.1 PSEUDOPHAKIA: ICD-10-CM

## 2017-09-07 DIAGNOSIS — H52.4 PRESBYOPIA: ICD-10-CM

## 2017-09-07 DIAGNOSIS — H04.123 DRY EYES, BILATERAL: ICD-10-CM

## 2017-09-07 DIAGNOSIS — I10 ESSENTIAL HYPERTENSION: ICD-10-CM

## 2017-09-07 PROCEDURE — 65855 TRABECULOPLASTY LASER SURG: CPT | Mod: PBBFAC,LT | Performed by: OPHTHALMOLOGY

## 2017-09-07 PROCEDURE — 99999 PR PBB SHADOW E&M-EST. PATIENT-LVL II: CPT | Mod: PBBFAC,,, | Performed by: OPHTHALMOLOGY

## 2017-09-07 PROCEDURE — 99499 UNLISTED E&M SERVICE: CPT | Mod: S$PBB,,, | Performed by: OPHTHALMOLOGY

## 2017-09-07 PROCEDURE — 99212 OFFICE O/P EST SF 10 MIN: CPT | Mod: PBBFAC | Performed by: OPHTHALMOLOGY

## 2017-09-07 NOTE — PROGRESS NOTES
HPI     Laser Treatment    Additional comments: slt os           Comments   SLT OS TODAY  S/p SLT OD 8/17/17    1. POAG OD>OS  2. NAOMI  3. PCIOL OU  4. HTN Retinopathy  5. Myopia    MEDS:  Combigan BID OU  Azopt TID OU  Latanoprost QHS OU       Last edited by Malika Levi MA on 9/7/2017  9:54 AM. (History)            Assessment /Plan     For exam results, see Encounter Report.    Primary open angle glaucoma of both eyes, severe stage  -     Argon Trabeculoplasty - OS - Left Eye    Dry eyes, bilateral    Essential hypertension    Presbyopia - Both Eyes    Pseudophakia - Both Eyes      LOST TO F/U X 2 YEARS 8/2014 TO 8/2016     1.   POAG OD > OS - severe stage   Pt Dx with glaucoma by Dr. Carias   First HVF 2010   First photos 2010   First [facility] eye visit 2010   H/O poor compliance lost to F/U 12/2010 to 3/2013           Family history    neg        Glaucoma meds    Timolol ou, xalatan ou        H/O adverse rxn to glaucoma drops    none        LASERS    SLT od 8/17/2017 (18-->15)   // SLT os 9/7/2017         GLAUCOMA SURGERIES    none        OTHER EYE SURGERIES    PC IOL - sulcus OD // bag OS - Crear        CDR    0.95 w/ sup and inf loss /0.8 with inf loss         Tbase    20/20        Tmax    20/20        Ttarget    16/16           HVF    3 test 2010 to 2016 -  gen dep - SAD/IAD od. / gen dep SALT os ++ progression OU        Gonio    +3 ou        CCT    502/501 (thin ou)        OCT    3 test 2010 to 2016  - RNFL - dec I od / nl os        HRT    2 test 2013 to 2017 - [place] off od // dec inf os /// CDR xx [place] off od //0.72 os        Disc photos    2010. 2016  - OIS     - Ttoday  15/17  - Test done today - SLT OS ?? F/U SLT od     2.    NAOMI - SPK ou   AT's prn    3.   PC IOL ou - outside doctor - Crear   Sulcus od   Bag os    4.   HTN - ? Hx of retinopathy. BP control     5.   Myopia / presbyopia (monovision IOL's)   Glasses - Martinez - 3/13/2013   -3.00 od and plano os    PLAN:  Mild improvement in IOP w/ restarting  gtts <1 month ago  IOP too high for CDR and VF loss and baseline IOP of 20  Changed combigan ou- re-start - ran out   Cont latanoprost ou   Cont Azopt  TID OU     Again encouraged compliance for glaucoma management and treatment      SLT OD 8/17/2017 - pre slt iop 18 (post slt od = 15)    -  SLT OS - 9/7/2017 - steroid taper     F/U 6 weeks // IOP check ou post slt's and update flow sheet with lasers     I have seen and personally examined the patient.  I agree with the findings, assessment and plan of the resident and/or fellow.     Brenda Ceja MD

## 2017-11-07 ENCOUNTER — OFFICE VISIT (OUTPATIENT)
Dept: OPHTHALMOLOGY | Facility: CLINIC | Age: 82
End: 2017-11-07
Payer: MEDICARE

## 2017-11-07 DIAGNOSIS — H52.7 REFRACTION ERROR: ICD-10-CM

## 2017-11-07 DIAGNOSIS — Z96.1 PSEUDOPHAKIA: ICD-10-CM

## 2017-11-07 DIAGNOSIS — H40.1133 PRIMARY OPEN ANGLE GLAUCOMA OF BOTH EYES, SEVERE STAGE: Primary | ICD-10-CM

## 2017-11-07 DIAGNOSIS — H52.4 PRESBYOPIA: ICD-10-CM

## 2017-11-07 DIAGNOSIS — H04.123 DRY EYES, BILATERAL: ICD-10-CM

## 2017-11-07 DIAGNOSIS — I10 ESSENTIAL HYPERTENSION: ICD-10-CM

## 2017-11-07 PROCEDURE — 92012 INTRM OPH EXAM EST PATIENT: CPT | Mod: S$PBB,,, | Performed by: OPHTHALMOLOGY

## 2017-11-07 PROCEDURE — 99999 PR PBB SHADOW E&M-EST. PATIENT-LVL II: CPT | Mod: PBBFAC,,, | Performed by: OPHTHALMOLOGY

## 2017-11-07 PROCEDURE — 99212 OFFICE O/P EST SF 10 MIN: CPT | Mod: PBBFAC | Performed by: OPHTHALMOLOGY

## 2017-11-07 NOTE — PROGRESS NOTES
HPI     DLS: 9/07/17    Pt here for IOP check;  S/P SLT OS- 9/07/17  S/P SLT OD- 8/17/17    Meds: Combigan bid ou             Azopt tid ou             Latanoprost qhs ou    1. Primary open angle glaucoma of both eyes, severe stage  2. Dry eyes, bilateral  3. Essential hypertension  4. Presbyopia, both eyes  5. Pseudophakia, both eyes    Last edited by Nancy De Leon on 11/7/2017  4:08 PM. (History)            Assessment /Plan     For exam results, see Encounter Report.    Primary open angle glaucoma of both eyes, severe stage    Dry eyes, bilateral    Essential hypertension    Presbyopia - Both Eyes    Pseudophakia - Both Eyes    Refraction error          LOST TO F/U X 2 YEARS 8/2014 TO 8/2016     1.   POAG OD > OS - severe stage   Pt Dx with glaucoma by Dr. Carias   First F 2010   First photos 2010   First [facility] eye visit 2010   H/O poor compliance lost to F/U 12/2010 to 3/2013           Family history    neg        Glaucoma meds    Timolol ou, xalatan ou        H/O adverse rxn to glaucoma drops    none        LASERS    SLT od 8/17/2017 (18-->10 - 15)   // SLT os 9/7/2017 19-->12        GLAUCOMA SURGERIES    none        OTHER EYE SURGERIES    PC IOL - sulcus OD // bag OS - Crear        CDR    0.95 w/ sup and inf loss /0.8 with inf loss         Tbase    20/20        Tmax    20/20        Ttarget    16/16           HVF    3 test 2010 to 2016 -  gen dep - SAD/IAD od. / gen dep SALT os ++ progression OU        Gonio    +3 ou        CCT    502/501 (thin ou)        OCT    3 test 2010 to 2016  - RNFL - dec I od / nl os        HRT    2 test 2013 to 2017 - [place] off od // dec inf os /// CDR xx [place] off od //0.72 os        Disc photos    2010. 2016  - OIS     - Ttoday  10/12  - Test done today - iop after SLT OU    2.    NAOMI - SPK ou   AT's prn    3.   PC IOL ou - outside doctor - Crear   Sulcus od   Bag os    4.   HTN - ? Hx of retinopathy. BP control     5.   Myopia / presbyopia (monovision IOL's)   Glasses - Martinez -  3/13/2013   -3.00 od and plano os    PLAN:  Changed combigan ou  Cont latanoprost ou   Cont Azopt  TID OU     Good response to SLT OU  SLT OD 8/17/2017 - pre slt iop 18 ---> 10  SLT OS - 9/7/2017 - pre slt Iop 19 --->12    Again encouraged compliance for glaucoma management and treatment    Follow up 3 mo, HVF / DFE / OCT      I have seen and personally examined the patient.  I agree with the findings, assessment and plan of the resident and/or fellow.     Brenda Ceja MD

## 2017-12-02 ENCOUNTER — LAB VISIT (OUTPATIENT)
Dept: LAB | Facility: HOSPITAL | Age: 82
End: 2017-12-02
Attending: INTERNAL MEDICINE
Payer: MEDICARE

## 2017-12-02 DIAGNOSIS — E03.9 HYPOTHYROIDISM, UNSPECIFIED TYPE: ICD-10-CM

## 2017-12-02 LAB
ALBUMIN SERPL BCP-MCNC: 3.2 G/DL
ALP SERPL-CCNC: 84 U/L
ALT SERPL W/O P-5'-P-CCNC: 8 U/L
ANION GAP SERPL CALC-SCNC: 11 MMOL/L
AST SERPL-CCNC: 15 U/L
BASOPHILS # BLD AUTO: 0.02 K/UL
BASOPHILS NFR BLD: 0.7 %
BILIRUB SERPL-MCNC: 1.1 MG/DL
BUN SERPL-MCNC: 13 MG/DL
CALCIUM SERPL-MCNC: 9.2 MG/DL
CHLORIDE SERPL-SCNC: 104 MMOL/L
CO2 SERPL-SCNC: 30 MMOL/L
CREAT SERPL-MCNC: 1.1 MG/DL
DIFFERENTIAL METHOD: ABNORMAL
EOSINOPHIL # BLD AUTO: 0.1 K/UL
EOSINOPHIL NFR BLD: 4.2 %
ERYTHROCYTE [DISTWIDTH] IN BLOOD BY AUTOMATED COUNT: 14.6 %
EST. GFR  (AFRICAN AMERICAN): 55 ML/MIN/1.73 M^2
EST. GFR  (NON AFRICAN AMERICAN): 48 ML/MIN/1.73 M^2
GLUCOSE SERPL-MCNC: 108 MG/DL
HCT VFR BLD AUTO: 39.4 %
HGB BLD-MCNC: 12.5 G/DL
LYMPHOCYTES # BLD AUTO: 1.1 K/UL
LYMPHOCYTES NFR BLD: 38.6 %
MCH RBC QN AUTO: 27.8 PG
MCHC RBC AUTO-ENTMCNC: 31.7 G/DL
MCV RBC AUTO: 88 FL
MONOCYTES # BLD AUTO: 0.3 K/UL
MONOCYTES NFR BLD: 10.2 %
NEUTROPHILS # BLD AUTO: 1.3 K/UL
NEUTROPHILS NFR BLD: 45.9 %
PLATELET # BLD AUTO: 229 K/UL
PMV BLD AUTO: 10.4 FL
POTASSIUM SERPL-SCNC: 3.6 MMOL/L
PROT SERPL-MCNC: 7.3 G/DL
RBC # BLD AUTO: 4.49 M/UL
SODIUM SERPL-SCNC: 145 MMOL/L
T4 FREE SERPL-MCNC: 0.87 NG/DL
TSH SERPL DL<=0.005 MIU/L-ACNC: 38.23 UIU/ML
WBC # BLD AUTO: 2.85 K/UL

## 2017-12-02 PROCEDURE — 85025 COMPLETE CBC W/AUTO DIFF WBC: CPT

## 2017-12-02 PROCEDURE — 84443 ASSAY THYROID STIM HORMONE: CPT

## 2017-12-02 PROCEDURE — 36415 COLL VENOUS BLD VENIPUNCTURE: CPT

## 2017-12-02 PROCEDURE — 84439 ASSAY OF FREE THYROXINE: CPT

## 2017-12-02 PROCEDURE — 80053 COMPREHEN METABOLIC PANEL: CPT

## 2017-12-06 ENCOUNTER — IMMUNIZATION (OUTPATIENT)
Dept: INTERNAL MEDICINE | Facility: CLINIC | Age: 82
End: 2017-12-06
Payer: MEDICARE

## 2017-12-06 ENCOUNTER — OFFICE VISIT (OUTPATIENT)
Dept: INTERNAL MEDICINE | Facility: CLINIC | Age: 82
End: 2017-12-06
Payer: MEDICARE

## 2017-12-06 VITALS
HEART RATE: 64 BPM | SYSTOLIC BLOOD PRESSURE: 128 MMHG | DIASTOLIC BLOOD PRESSURE: 80 MMHG | WEIGHT: 174.81 LBS | BODY MASS INDEX: 29.84 KG/M2 | OXYGEN SATURATION: 95 % | HEIGHT: 64 IN

## 2017-12-06 DIAGNOSIS — R41.3 MEMORY LOSS: ICD-10-CM

## 2017-12-06 DIAGNOSIS — E53.8 VITAMIN B12 DEFICIENCY: ICD-10-CM

## 2017-12-06 DIAGNOSIS — E03.9 HYPOTHYROIDISM, UNSPECIFIED TYPE: Primary | ICD-10-CM

## 2017-12-06 DIAGNOSIS — I10 ESSENTIAL HYPERTENSION: ICD-10-CM

## 2017-12-06 DIAGNOSIS — E78.5 HYPERLIPIDEMIA, UNSPECIFIED HYPERLIPIDEMIA TYPE: ICD-10-CM

## 2017-12-06 PROCEDURE — 96372 THER/PROPH/DIAG INJ SC/IM: CPT | Mod: PBBFAC

## 2017-12-06 PROCEDURE — 99214 OFFICE O/P EST MOD 30 MIN: CPT | Mod: S$PBB,,, | Performed by: INTERNAL MEDICINE

## 2017-12-06 PROCEDURE — 99999 PR PBB SHADOW E&M-EST. PATIENT-LVL II: CPT | Mod: PBBFAC,,, | Performed by: INTERNAL MEDICINE

## 2017-12-06 PROCEDURE — G0008 ADMIN INFLUENZA VIRUS VAC: HCPCS | Mod: PBBFAC

## 2017-12-06 PROCEDURE — 99212 OFFICE O/P EST SF 10 MIN: CPT | Mod: PBBFAC,25 | Performed by: INTERNAL MEDICINE

## 2017-12-06 RX ORDER — LEVOTHYROXINE SODIUM 112 UG/1
112 TABLET ORAL DAILY
Qty: 90 TABLET | Refills: 4 | Status: SHIPPED | OUTPATIENT
Start: 2017-12-06 | End: 2019-03-20 | Stop reason: SDUPTHER

## 2017-12-06 RX ORDER — CYANOCOBALAMIN 1000 UG/ML
100 INJECTION, SOLUTION INTRAMUSCULAR; SUBCUTANEOUS
Status: COMPLETED | OUTPATIENT
Start: 2017-12-06 | End: 2017-12-06

## 2017-12-06 RX ADMIN — CYANOCOBALAMIN 100 MCG: 1000 INJECTION, SOLUTION INTRAMUSCULAR at 02:12

## 2018-01-22 ENCOUNTER — HOSPITAL ENCOUNTER (EMERGENCY)
Facility: HOSPITAL | Age: 83
Discharge: HOME | End: 2018-01-23
Payer: MEDICARE

## 2018-01-22 DIAGNOSIS — R10.9 LEFT FLANK PAIN: Primary | ICD-10-CM

## 2018-01-22 DIAGNOSIS — I10 HYPERTENSION: ICD-10-CM

## 2018-01-22 LAB
ALBUMIN SERPL BCP-MCNC: 3.5 G/DL
ALP SERPL-CCNC: 93 U/L
ALT SERPL W/O P-5'-P-CCNC: 7 U/L
ANION GAP SERPL CALC-SCNC: 9 MMOL/L
ANISOCYTOSIS BLD QL SMEAR: SLIGHT
AST SERPL-CCNC: 16 U/L
BACTERIA #/AREA URNS AUTO: NORMAL /HPF
BASOPHILS # BLD AUTO: 0.03 K/UL
BASOPHILS NFR BLD: 0.9 %
BILIRUB SERPL-MCNC: 0.8 MG/DL
BILIRUB UR QL STRIP: NEGATIVE
BUN SERPL-MCNC: 13 MG/DL
CALCIUM SERPL-MCNC: 9.9 MG/DL
CHLORIDE SERPL-SCNC: 103 MMOL/L
CLARITY UR REFRACT.AUTO: ABNORMAL
CO2 SERPL-SCNC: 30 MMOL/L
COLOR UR AUTO: YELLOW
CREAT SERPL-MCNC: 1 MG/DL
DIFFERENTIAL METHOD: ABNORMAL
EOSINOPHIL # BLD AUTO: 0.2 K/UL
EOSINOPHIL NFR BLD: 5.6 %
ERYTHROCYTE [DISTWIDTH] IN BLOOD BY AUTOMATED COUNT: 14 %
EST. GFR  (AFRICAN AMERICAN): >60 ML/MIN/1.73 M^2
EST. GFR  (NON AFRICAN AMERICAN): 53.3 ML/MIN/1.73 M^2
GLUCOSE SERPL-MCNC: 100 MG/DL
GLUCOSE UR QL STRIP: NEGATIVE
HCT VFR BLD AUTO: 39.8 %
HGB BLD-MCNC: 13.1 G/DL
HGB UR QL STRIP: NEGATIVE
HYALINE CASTS UR QL AUTO: 1 /LPF
IMM GRANULOCYTES # BLD AUTO: 0.01 K/UL
IMM GRANULOCYTES NFR BLD AUTO: 0.3 %
KETONES UR QL STRIP: NEGATIVE
LEUKOCYTE ESTERASE UR QL STRIP: ABNORMAL
LYMPHOCYTES # BLD AUTO: 1.5 K/UL
LYMPHOCYTES NFR BLD: 45.3 %
MCH RBC QN AUTO: 28.2 PG
MCHC RBC AUTO-ENTMCNC: 32.9 G/DL
MCV RBC AUTO: 86 FL
MICROSCOPIC COMMENT: NORMAL
MONOCYTES # BLD AUTO: 0.3 K/UL
MONOCYTES NFR BLD: 8 %
NEUTROPHILS # BLD AUTO: 1.4 K/UL
NEUTROPHILS NFR BLD: 39.9 %
NITRITE UR QL STRIP: NEGATIVE
NRBC BLD-RTO: 0 /100 WBC
PH UR STRIP: 5 [PH] (ref 5–8)
PLATELET # BLD AUTO: 223 K/UL
PMV BLD AUTO: 10.9 FL
POTASSIUM SERPL-SCNC: 3.6 MMOL/L
PROT SERPL-MCNC: 8.1 G/DL
PROT UR QL STRIP: ABNORMAL
RBC # BLD AUTO: 4.65 M/UL
RBC #/AREA URNS AUTO: 1 /HPF (ref 0–4)
SODIUM SERPL-SCNC: 142 MMOL/L
SP GR UR STRIP: 1.03 (ref 1–1.03)
SQUAMOUS #/AREA URNS AUTO: 10 /HPF
URN SPEC COLLECT METH UR: ABNORMAL
UROBILINOGEN UR STRIP-ACNC: 2 EU/DL
WBC # BLD AUTO: 3.38 K/UL
WBC #/AREA URNS AUTO: 4 /HPF (ref 0–5)

## 2018-01-22 PROCEDURE — 81001 URINALYSIS AUTO W/SCOPE: CPT

## 2018-01-22 PROCEDURE — 99284 EMERGENCY DEPT VISIT MOD MDM: CPT | Mod: ,,, | Performed by: PHYSICIAN ASSISTANT

## 2018-01-22 PROCEDURE — 85025 COMPLETE CBC W/AUTO DIFF WBC: CPT

## 2018-01-22 PROCEDURE — 99283 EMERGENCY DEPT VISIT LOW MDM: CPT

## 2018-01-22 PROCEDURE — 80053 COMPREHEN METABOLIC PANEL: CPT

## 2018-01-23 VITALS
RESPIRATION RATE: 18 BRPM | HEART RATE: 66 BPM | DIASTOLIC BLOOD PRESSURE: 92 MMHG | SYSTOLIC BLOOD PRESSURE: 168 MMHG | BODY MASS INDEX: 28.17 KG/M2 | HEIGHT: 64 IN | OXYGEN SATURATION: 94 % | TEMPERATURE: 99 F | WEIGHT: 165 LBS

## 2018-01-23 NOTE — ED TRIAGE NOTES
Pt reports left side flank pain x2 days. Denies dysuria/hematuria. Denies n/v, diarrhea, constipation. Denies having similar pain in the past.       Pt identifiers Michelle Campo checked and correct  LOC: The patient is awake, alert, aware of environment with an appropriate affect. Oriented x3, speaking appropriately  APPEARANCE: Pt resting comfortably, in no acute distress, pt is clean and well groomed, clothing properly fastened  SKIN: Skin warm, dry and intact, normal skin turgor, moist mucus membranes  RESPIRATORY: Airway is open and patent, respirations are spontaneous, even and unlabored, normal effort and rate  : c/o left side flank pain x2 days. Denies dysuria/hematuria.

## 2018-01-23 NOTE — DISCHARGE INSTRUCTIONS
Take Tylenol as needed for pain.  Follow up with your primary care doctor in 2 days if your symptoms do not improve.  Return to the ER immediately for any new or worsening symptoms.

## 2018-01-23 NOTE — ED PROVIDER NOTES
"Encounter Date: 1/22/2018    SCRIBE #1 NOTE: I, Elizabeth Martin, am scribing for, and in the presence of,  Bekah Horton PA-C. I have scribed the entire note.       History     Chief Complaint   Patient presents with    Flank Pain     pain to L flank area, denies urinary problems     Time patient was seen by the provider: 10:21 PM      The patient is a 82 y.o. female with a PM HX of: HTN, HLD, hyperthyroidism, COPD, glaucoma, and shingles, who presents to the ED with a complaint of left flank pain that began yesterday. The patient describes the pain as a "squeezing" pain that is intermittent. Currently the patient has no pain. Patient has no PHX of kidney stones. She denies N/V, dysuria or other changes in urination, recent injury, fever, chills, cough, weakness, and sore throat.         The history is provided by the patient and medical records.     Review of patient's allergies indicates:   Allergen Reactions    Pcn [penicillins]      Past Medical History:   Diagnosis Date    COPD (chronic obstructive pulmonary disease) 3/8/2013    CVA (cerebral infarction) 3/8/2013    Dry eye syndrome     Glaucoma     H/O: pneumothorax 3/8/2013    Spontaneous in 2007    Hyperlipidemia 3/8/2013    Hypertension 3/8/2013    Hypothyroidism 3/8/2013    Osteopenia 3/8/2013     Past Surgical History:   Procedure Laterality Date    CATARACT EXTRACTION W/  INTRAOCULAR LENS IMPLANT Bilateral n/a    Dr. Carias    SELECTIVE LASER TRABECUPLASTY Bilateral 08/2017        TOTAL ABDOMINAL HYSTERECTOMY W/ BILATERAL SALPINGOOPHORECTOMY       Family History   Problem Relation Age of Onset    Stroke Mother     Cancer Brother      bone cancer    Heart attack Son     Alcohol abuse Brother     Aneurysm Brother     Hypertension Daughter     Glaucoma Son     Amblyopia Neg Hx     Blindness Neg Hx     Cataracts Neg Hx     Macular degeneration Neg Hx     Retinal detachment Neg Hx     Strabismus Neg Hx      Social " History   Substance Use Topics    Smoking status: Former Smoker    Smokeless tobacco: Never Used      Comment: quit smoking in 2001    Alcohol use No     Review of Systems   Constitutional: Negative for chills and fever.   HENT: Negative for sore throat.    Respiratory: Negative for cough and shortness of breath.    Cardiovascular: Negative for chest pain.   Gastrointestinal: Negative for nausea and vomiting.   Genitourinary: Positive for flank pain (left). Negative for difficulty urinating and dysuria.   Musculoskeletal: Negative for back pain.            Skin: Negative for rash.   Neurological: Negative for weakness and light-headedness.   Hematological: Does not bruise/bleed easily.       Physical Exam     Initial Vitals [01/22/18 1642]   BP Pulse Resp Temp SpO2   (!) 179/91 74 18 98.5 °F (36.9 °C) 97 %      MAP       120.33         Physical Exam    Nursing note and vitals reviewed.  Constitutional: She appears well-developed and well-nourished. She is not diaphoretic.  Non-toxic appearance. She does not appear ill. No distress.   HENT:   Head: Normocephalic and atraumatic.   Neck: Neck supple.   Cardiovascular: Normal rate and regular rhythm. Exam reveals no gallop and no friction rub.    No murmur heard.  Trace peripheral edema.    Pulmonary/Chest: Effort normal and breath sounds normal. No accessory muscle usage. No tachypnea. No respiratory distress. She has no decreased breath sounds. She has no wheezes. She has no rhonchi. She has no rales.   Abdominal: Soft. Normal appearance and bowel sounds are normal. She exhibits no distension. There is no tenderness. There is no CVA tenderness.   No rashes or skin lesions to the left flank.   Musculoskeletal: Normal range of motion.   Neurological: She is alert and oriented to person, place, and time.   Skin: Skin is warm and dry. No rash noted. No pallor.   Psychiatric: She has a normal mood and affect. Her behavior is normal. Judgment and thought content normal.          ED Course   Procedures  Labs Reviewed   URINALYSIS, REFLEX TO URINE CULTURE - Abnormal; Notable for the following:        Result Value    Appearance, UA Hazy (*)     Protein, UA 1+ (*)     Leukocytes, UA 1+ (*)     All other components within normal limits    Narrative:     NO GRAY TOP GIVEN   CBC W/ AUTO DIFFERENTIAL - Abnormal; Notable for the following:     WBC 3.38 (*)     Gran # 1.4 (*)     All other components within normal limits   COMPREHENSIVE METABOLIC PANEL - Abnormal; Notable for the following:     CO2 30 (*)     ALT 7 (*)     eGFR if non  53.3 (*)     All other components within normal limits   URINALYSIS MICROSCOPIC    Narrative:     NO GRAY TOP GIVEN             Medical Decision Making:   History:   Old Medical Records: I decided to obtain old medical records.  Differential Diagnosis:   My differential diagnosis includes but is not limited to:  UTI, pyelonephritis, renal stone, muscle strain, muscle spasm, shingles  Clinical Tests:   Lab Tests: Ordered and Reviewed       APC / Resident Notes:   82-year-old female presents with intermittent left flank pain since yesterday.  Denies injury or history of renal stones.  Patient denied any pain at the time of my evaluation.  Patient is hypertensive at 179/91.  Vitals otherwise within normal limits.  She was nontoxic in appearance and in no acute distress.  No abdominal tenderness or CVA tenderness on exam.  No rashes concerning for shingles.     UA without evidence of infection or hematuria.  CBC without acute abnormalities.  CMP revealed no acute kidney injury.     Patient did not report any recurrent pain while in the ED.  I do not feel that emergent imaging is indicated at this time given patient's lack of symptoms.  I have considered but do not suspect renal stone.  I will discharge patient in stable condition with instructions for acetaminophen as needed for pain.  Patient instructed to follow up with primary care doctor in 2-3  days if her pain recurs.  Return precautions given. I have reviewed the patient's records and discussed this case with my supervising physician.         Scribe Attestation:   Scribe #1: I performed the above scribed service and the documentation accurately describes the services I performed. I attest to the accuracy of the note.            ED Course      Clinical Impression:   The encounter diagnosis was Left flank pain.    Disposition:   Disposition: Discharged                        Bekah Horton PA-C  01/23/18 0214

## 2018-01-26 ENCOUNTER — HOSPITAL ENCOUNTER (EMERGENCY)
Facility: HOSPITAL | Age: 83
Discharge: HOME | End: 2018-01-26
Attending: EMERGENCY MEDICINE
Payer: MEDICARE

## 2018-01-26 VITALS
RESPIRATION RATE: 20 BRPM | SYSTOLIC BLOOD PRESSURE: 175 MMHG | TEMPERATURE: 99 F | WEIGHT: 154 LBS | DIASTOLIC BLOOD PRESSURE: 88 MMHG | HEART RATE: 64 BPM | OXYGEN SATURATION: 92 % | BODY MASS INDEX: 26.43 KG/M2

## 2018-01-26 DIAGNOSIS — R10.9 ACUTE LEFT FLANK PAIN: ICD-10-CM

## 2018-01-26 DIAGNOSIS — K57.92 DIVERTICULITIS: Primary | ICD-10-CM

## 2018-01-26 LAB
ALBUMIN SERPL BCP-MCNC: 3.2 G/DL
ALP SERPL-CCNC: 83 U/L
ALT SERPL W/O P-5'-P-CCNC: 6 U/L
ANION GAP SERPL CALC-SCNC: 11 MMOL/L
AST SERPL-CCNC: 16 U/L
BACTERIA #/AREA URNS AUTO: NORMAL /HPF
BASOPHILS # BLD AUTO: 0.03 K/UL
BASOPHILS NFR BLD: 0.8 %
BILIRUB SERPL-MCNC: 1.4 MG/DL
BILIRUB UR QL STRIP: NEGATIVE
BUN SERPL-MCNC: 8 MG/DL
CALCIUM SERPL-MCNC: 9.7 MG/DL
CHLORIDE SERPL-SCNC: 101 MMOL/L
CLARITY UR REFRACT.AUTO: ABNORMAL
CO2 SERPL-SCNC: 30 MMOL/L
COLOR UR AUTO: YELLOW
CREAT SERPL-MCNC: 1.1 MG/DL
DIFFERENTIAL METHOD: ABNORMAL
EOSINOPHIL # BLD AUTO: 0.1 K/UL
EOSINOPHIL NFR BLD: 3.5 %
ERYTHROCYTE [DISTWIDTH] IN BLOOD BY AUTOMATED COUNT: 14.1 %
EST. GFR  (AFRICAN AMERICAN): 54.8 ML/MIN/1.73 M^2
EST. GFR  (NON AFRICAN AMERICAN): 47.5 ML/MIN/1.73 M^2
GLUCOSE SERPL-MCNC: 96 MG/DL
GLUCOSE UR QL STRIP: NEGATIVE
HCT VFR BLD AUTO: 38 %
HGB BLD-MCNC: 12.4 G/DL
HGB UR QL STRIP: NEGATIVE
IMM GRANULOCYTES # BLD AUTO: 0.01 K/UL
IMM GRANULOCYTES NFR BLD AUTO: 0.3 %
KETONES UR QL STRIP: NEGATIVE
LEUKOCYTE ESTERASE UR QL STRIP: ABNORMAL
LYMPHOCYTES # BLD AUTO: 1.5 K/UL
LYMPHOCYTES NFR BLD: 40.5 %
MCH RBC QN AUTO: 27.6 PG
MCHC RBC AUTO-ENTMCNC: 32.6 G/DL
MCV RBC AUTO: 84 FL
MICROSCOPIC COMMENT: NORMAL
MONOCYTES # BLD AUTO: 0.3 K/UL
MONOCYTES NFR BLD: 8.5 %
NEUTROPHILS # BLD AUTO: 1.7 K/UL
NEUTROPHILS NFR BLD: 46.4 %
NITRITE UR QL STRIP: NEGATIVE
NRBC BLD-RTO: 0 /100 WBC
PH UR STRIP: 6 [PH] (ref 5–8)
PLATELET # BLD AUTO: 223 K/UL
PMV BLD AUTO: 10.1 FL
POTASSIUM SERPL-SCNC: 3.5 MMOL/L
PROT SERPL-MCNC: 7.3 G/DL
PROT UR QL STRIP: NEGATIVE
RBC # BLD AUTO: 4.5 M/UL
RBC #/AREA URNS AUTO: 0 /HPF (ref 0–4)
SODIUM SERPL-SCNC: 142 MMOL/L
SP GR UR STRIP: 1.01 (ref 1–1.03)
SQUAMOUS #/AREA URNS AUTO: 6 /HPF
URN SPEC COLLECT METH UR: ABNORMAL
UROBILINOGEN UR STRIP-ACNC: NEGATIVE EU/DL
WBC # BLD AUTO: 3.75 K/UL
WBC #/AREA URNS AUTO: 5 /HPF (ref 0–5)

## 2018-01-26 PROCEDURE — 99284 EMERGENCY DEPT VISIT MOD MDM: CPT

## 2018-01-26 PROCEDURE — 25000003 PHARM REV CODE 250: Performed by: PHYSICIAN ASSISTANT

## 2018-01-26 PROCEDURE — 85025 COMPLETE CBC W/AUTO DIFF WBC: CPT

## 2018-01-26 PROCEDURE — 81001 URINALYSIS AUTO W/SCOPE: CPT

## 2018-01-26 PROCEDURE — 80053 COMPREHEN METABOLIC PANEL: CPT

## 2018-01-26 PROCEDURE — 99284 EMERGENCY DEPT VISIT MOD MDM: CPT | Mod: ,,, | Performed by: PHYSICIAN ASSISTANT

## 2018-01-26 RX ORDER — HYDROCODONE BITARTRATE AND ACETAMINOPHEN 5; 325 MG/1; MG/1
1 TABLET ORAL
Status: COMPLETED | OUTPATIENT
Start: 2018-01-26 | End: 2018-01-26

## 2018-01-26 RX ORDER — CIPROFLOXACIN 500 MG/1
500 TABLET ORAL 2 TIMES DAILY
Qty: 20 TABLET | Refills: 0 | Status: SHIPPED | OUTPATIENT
Start: 2018-01-26 | End: 2018-02-05

## 2018-01-26 RX ORDER — METRONIDAZOLE 500 MG/1
500 TABLET ORAL 3 TIMES DAILY
Qty: 21 TABLET | Refills: 0 | Status: SHIPPED | OUTPATIENT
Start: 2018-01-26 | End: 2018-02-02

## 2018-01-26 RX ORDER — HYDROCODONE BITARTRATE AND ACETAMINOPHEN 5; 325 MG/1; MG/1
1 TABLET ORAL EVERY 6 HOURS PRN
Qty: 12 TABLET | Refills: 0 | Status: SHIPPED | OUTPATIENT
Start: 2018-01-26 | End: 2018-10-31

## 2018-01-26 RX ADMIN — HYDROCODONE BITARTRATE AND ACETAMINOPHEN 1 TABLET: 5; 325 TABLET ORAL at 07:01

## 2018-01-27 NOTE — ED PROVIDER NOTES
Encounter Date: 1/26/2018    SCRIBE #1 NOTE: I, Shital Pacheco, am scribing for, and in the presence of,  Dr. Christoph Durand. I have scribed the following portions of the note - the APC attestation.       History     Chief Complaint   Patient presents with    Flank Pain     Left flank pain, seen monday for same complaint.      83 yo F with a hx significant for HTN, HLD, hyperthyroidism, COPD, glaucoma, and shingles presents to the ED with a cc of persistent left flank pain for the past 5 days.  Pain is described as squeezing and sharp and has been intermittent.  Patient was seen in the ED 4 days ago for the same complaint. She does not currently have pain.  Patient reports urinary frequency.  She denies nausea, vomiting, diarrhea, abdominal pain, injury, fever, chills, dysuria, cough.  She has attempted treatment with Tylenol without relief.           Review of patient's allergies indicates:   Allergen Reactions    Pcn [penicillins]      Past Medical History:   Diagnosis Date    COPD (chronic obstructive pulmonary disease) 3/8/2013    CVA (cerebral infarction) 3/8/2013    Dry eye syndrome     Glaucoma     H/O: pneumothorax 3/8/2013    Spontaneous in 2007    Hyperlipidemia 3/8/2013    Hypertension 3/8/2013    Hypothyroidism 3/8/2013    Osteopenia 3/8/2013     Past Surgical History:   Procedure Laterality Date    CATARACT EXTRACTION W/  INTRAOCULAR LENS IMPLANT Bilateral n/a    Dr. Carias    SELECTIVE LASER TRABECUPLASTY Bilateral 08/2017        TOTAL ABDOMINAL HYSTERECTOMY W/ BILATERAL SALPINGOOPHORECTOMY       Family History   Problem Relation Age of Onset    Stroke Mother     Cancer Brother      bone cancer    Heart attack Son     Alcohol abuse Brother     Aneurysm Brother     Hypertension Daughter     Glaucoma Son     Amblyopia Neg Hx     Blindness Neg Hx     Cataracts Neg Hx     Macular degeneration Neg Hx     Retinal detachment Neg Hx     Strabismus Neg Hx      Social History    Substance Use Topics    Smoking status: Former Smoker    Smokeless tobacco: Never Used      Comment: quit smoking in 2001    Alcohol use No     Review of Systems   Constitutional: Negative for chills and fever.   HENT: Negative for sore throat.    Respiratory: Negative for cough and shortness of breath.    Cardiovascular: Negative for chest pain.   Gastrointestinal: Negative for abdominal pain, diarrhea, nausea and vomiting.   Genitourinary: Positive for flank pain and frequency. Negative for dysuria.   Musculoskeletal: Negative for back pain.   Skin: Negative for rash.   Neurological: Negative for weakness.   Hematological: Does not bruise/bleed easily.       Physical Exam     Initial Vitals [01/26/18 1646]   BP Pulse Resp Temp SpO2   (!) 190/93 87 20 98.7 °F (37.1 °C) (!) 94 %      MAP       125.33         Physical Exam    Nursing note and vitals reviewed.  Constitutional: She appears well-developed and well-nourished. She is not diaphoretic.  Non-toxic appearance. She does not appear ill. No distress.   HENT:   Head: Normocephalic and atraumatic.   Neck: Neck supple.   Cardiovascular: Normal rate and regular rhythm. Exam reveals no gallop and no friction rub.    No murmur heard.  Pulmonary/Chest: Effort normal and breath sounds normal. No accessory muscle usage. No tachypnea. No respiratory distress. She has no decreased breath sounds. She has no wheezes. She has no rhonchi. She has no rales.   Abdominal: Soft. Normal appearance and bowel sounds are normal. She exhibits no distension. There is no tenderness. There is no CVA tenderness.   No rashes or skin lesions noted to the left flank   Musculoskeletal: Normal range of motion.   Neurological: She is alert and oriented to person, place, and time.   Skin: Skin is warm and dry. No rash noted. No pallor.   Psychiatric: She has a normal mood and affect. Her behavior is normal. Judgment and thought content normal.         ED Course   Procedures  Labs Reviewed    COMPREHENSIVE METABOLIC PANEL - Abnormal; Notable for the following:        Result Value    CO2 30 (*)     Albumin 3.2 (*)     Total Bilirubin 1.4 (*)     ALT 6 (*)     eGFR if  54.8 (*)     eGFR if non  47.5 (*)     All other components within normal limits   CBC W/ AUTO DIFFERENTIAL - Abnormal; Notable for the following:     WBC 3.75 (*)     Gran # (ANC) 1.7 (*)     All other components within normal limits   URINALYSIS, REFLEX TO URINE CULTURE - Abnormal; Notable for the following:     Appearance, UA Hazy (*)     Leukocytes, UA 1+ (*)     All other components within normal limits   URINALYSIS MICROSCOPIC        Imaging Results          CT Renal Stone Study ABD Pelvis WO (Final result)  Result time 01/26/18 21:27:17    Final result by Varghese Jain MD (01/26/18 21:27:17)                 Impression:        Significant diverticulosis coli with subtle inflammatory change involving the distal ascending colon, raising the question of diverticulitis in the correct clinical setting. Please note that evaluation of this finding is limited in the absence of IV or oral contrast and no prior exams for comparison.    No nephrolithiasis or obstructive uropathy.    Moderate-sized hiatal hernia.    Ectatic infrarenal abdominal aorta.    Hysterectomy.  ______________________________________     Electronically signed by resident: RAGHU AVALOS MD  Date:     01/26/18  Time:    21:01            As the supervising and teaching physician, I personally reviewed the images and resident's interpretation and I agree with the findings.          Electronically signed by: Varghese Jain  Date:     01/26/18  Time:    21:27              Narrative:    Procedure comments: 5-mm axial images from the top of the kidneys to the base of the bladder were obtained without the use of contrast material per the renal stone study protocol.  Coronal, axial, and sagittal reformatted images were reviewed.    Comparison:  None    Findings:    Examination of the lung bases demonstrate bandlike opacity in the left lower lobe, suggestive of subsegmental atelectasis.  Note is made of a calcified granuloma in the right middle lobe.  The heart and pericardium appear unremarkable.  There is mitral valvular calcifications.    The liver and spleen are normal in size and attenuation with no focal abnormality on this noncontrast examination.  The gallbladder appears unremarkable.  No intra-or extrahepatic biliary ductal dilatation.  No dyspnea and of a moderate sized hiatal hernia.  The stomach is not well-distended but appears unremarkable.  The small bowel demonstrates no evidence of obstruction.  Significant colonic diverticulosis with mild fat stranding in the region of the hepatic flexure which may represent early non-complicated diverticulitis (axial images 39-41).  No evidence of free intraperitoneal air or adjacent fluid collection to suggest abscess formation.  The appendix is visualized and appears unremarkable.      The kidneys are normal in size.  There is no evidence of nephrolithiasis or hydronephrosis.  The urinary bladder appears unremarkable.  Patient is status post hysterectomy.    The abdominal aorta is ectatic with moderate atherosclerotic calcification throughout its course and branch vessels.  There is no lymph node enlargement.      The osseous structures demonstrate degenerative changes throughout the visualized thoracolumbar spine.                               Medical Decision Making:   History:   Old Medical Records: I decided to obtain old medical records.  Differential Diagnosis:   My differential diagnosis includes but is not limited to: Renal stone, renal colic, pyelonephritis, UTI, pneumonia, shingles, muscle strain  Clinical Tests:   Lab Tests: Ordered and Reviewed  Radiological Study: Ordered and Reviewed       APC / Resident Notes:   82-year-old female returns to the ER for evaluation of persistent left flank  pain that has been intermittent for the past 5 days.  Patient seen 4 days ago for the same symptoms.  Patient did not have recurrent pain during her prior ED visit.  No imaging was done for this reason.  Currently, patient does not have pain.  Patient is hypertensive at 190/93.  Afebrile.  Vitals otherwise within normal limits.  The abdomen is soft and nontender.  There is no CVA tenderness on exam.  No rashes or skin lesions noted to the flank.  Lungs are clear to auscultation bilaterally.    We'll repeat blood work, UA and obtain CT renal stone study.  I will give pain medication and reassess.  CT was remarkable for a mild diverticulitis.  Unclear if this is the source of patient's pain.  I will treat with Flagyl, Cipro.  I was also discharge with analgesics.  Patient is instructed to follow up with her primary care doctor within 1 week if her symptoms do not improve.  Return precautions given. I have reviewed the patient's records and discussed this case with my supervising physician.         Scribe Attestation:   Scribe #1: I performed the above scribed service and the documentation accurately describes the services I performed. I attest to the accuracy of the note.    Attending Attestation:     Physician Attestation Statement for NP/PA:   I discussed this assessment and plan of this patient with the NP/PA, but I did not personally examine the patient. The face to face encounter was performed by the NP/PA.    Other NP/PA Attestation Additions:      Medical Decision Making: I am in agreement with my physician assistant's assessment, treatment, and plan of care.          I, Dr. Christoph Durand, personally performed the services described in this documentation. All medical record entries made by the scribe were at my direction and in my presence.  I have reviewed the chart and agree that the record reflects my personal performance and is accurate and complete. Christoph Durand MD.  7:19 PM 01/26/2018          ED Course       Clinical Impression:   The primary encounter diagnosis was Diverticulitis. A diagnosis of Acute left flank pain was also pertinent to this visit.    Disposition:   Disposition: Discharged  Condition: Stable                        Bekah Horton PA-C  01/26/18 [id number]

## 2018-01-27 NOTE — ED TRIAGE NOTES
Returning to ER with left flank pain since Tuesday.  Has been taking tylenol at home to ease the pain without success.    Pt identifiers checked and correct  LOC: The patient is awake, alert, aware of environment with an appropriate affect. Oriented x3, speaking appropriately  APPEARANCE: Pt resting comfortably, in no acute distress, pt is clean and well groomed, clothing properly fastened  SKIN: Skin warm, dry and intact, normal skin turgor, moist mucus membranes  RESPIRATORY: Airway is open and patent, respirations are spontaneous, even and unlabored, normal effort and rate  MUSCULOSKELETAL: No obvious deformities.

## 2018-03-20 ENCOUNTER — CLINICAL SUPPORT (OUTPATIENT)
Dept: OPHTHALMOLOGY | Facility: CLINIC | Age: 83
End: 2018-03-20
Payer: MEDICARE

## 2018-03-20 ENCOUNTER — OFFICE VISIT (OUTPATIENT)
Dept: OPHTHALMOLOGY | Facility: CLINIC | Age: 83
End: 2018-03-20
Payer: MEDICARE

## 2018-03-20 DIAGNOSIS — H04.123 DRY EYES, BILATERAL: ICD-10-CM

## 2018-03-20 DIAGNOSIS — H40.1133 PRIMARY OPEN ANGLE GLAUCOMA OF BOTH EYES, SEVERE STAGE: ICD-10-CM

## 2018-03-20 DIAGNOSIS — I10 ESSENTIAL HYPERTENSION: ICD-10-CM

## 2018-03-20 DIAGNOSIS — Z96.1 PSEUDOPHAKIA: ICD-10-CM

## 2018-03-20 DIAGNOSIS — H40.89 OTHER GLAUCOMA OF BOTH EYES: ICD-10-CM

## 2018-03-20 DIAGNOSIS — H40.1133 PRIMARY OPEN ANGLE GLAUCOMA OF BOTH EYES, SEVERE STAGE: Primary | ICD-10-CM

## 2018-03-20 DIAGNOSIS — H52.4 PRESBYOPIA: ICD-10-CM

## 2018-03-20 PROCEDURE — 99211 OFF/OP EST MAY X REQ PHY/QHP: CPT | Mod: PBBFAC | Performed by: OPHTHALMOLOGY

## 2018-03-20 PROCEDURE — 92133 CPTRZD OPH DX IMG PST SGM ON: CPT | Mod: PBBFAC | Performed by: OPHTHALMOLOGY

## 2018-03-20 PROCEDURE — 92012 INTRM OPH EXAM EST PATIENT: CPT | Mod: S$PBB,,, | Performed by: OPHTHALMOLOGY

## 2018-03-20 PROCEDURE — 92083 EXTENDED VISUAL FIELD XM: CPT | Mod: 26,S$PBB,, | Performed by: OPHTHALMOLOGY

## 2018-03-20 PROCEDURE — 92083 EXTENDED VISUAL FIELD XM: CPT | Mod: PBBFAC

## 2018-03-20 PROCEDURE — 99999 PR PBB SHADOW E&M-EST. PATIENT-LVL I: CPT | Mod: PBBFAC,,, | Performed by: OPHTHALMOLOGY

## 2018-03-20 RX ORDER — BRINZOLAMIDE 10 MG/ML
1 SUSPENSION/ DROPS OPHTHALMIC 3 TIMES DAILY
Qty: 10 ML | Refills: 12 | Status: SHIPPED | OUTPATIENT
Start: 2018-03-20 | End: 2018-09-06 | Stop reason: SDUPTHER

## 2018-03-20 RX ORDER — BRIMONIDINE TARTRATE AND TIMOLOL MALEATE 2; 5 MG/ML; MG/ML
1 SOLUTION OPHTHALMIC 2 TIMES DAILY
Qty: 10 ML | Refills: 12 | Status: SHIPPED | OUTPATIENT
Start: 2018-03-20 | End: 2018-09-06 | Stop reason: SDUPTHER

## 2018-03-20 RX ORDER — LATANOPROST 50 UG/ML
1 SOLUTION/ DROPS OPHTHALMIC NIGHTLY
Qty: 1 BOTTLE | Refills: 12 | Status: SHIPPED | OUTPATIENT
Start: 2018-03-20 | End: 2018-09-06 | Stop reason: SDUPTHER

## 2018-03-20 NOTE — PROGRESS NOTES
HPI     DLS: 11/07/17    Pt here for HVF review;    Meds: Combigan bid ou             Azopt tid ou             Latanoprost qhs ou    1. Primary open angle glaucoma of both eyes, severe stage   2. Dry eyes, bilateral   3. Essential hypertension   4. Presbyopia, both eyes   5. Pseudophakia, both eyes      Last edited by Nancy De Leon on 3/20/2018  3:09 PM. (History)            Assessment /Plan     For exam results, see Encounter Report.    Dry eyes, bilateral    Primary open angle glaucoma of both eyes, severe stage  -     Posterior Segment OCT Optic Nerve- Both eyes    Essential hypertension    Presbyopia - Both Eyes    Pseudophakia - Both Eyes        LOST TO F/U X 2 YEARS 8/2014 TO 8/2016     1.   POAG OD > OS - severe stage   Pt Dx with glaucoma by Dr. Aretha Morejon F 2010   First photos 2010   First [facility] eye visit 2010   H/O poor compliance lost to F/U 12/2010 to 3/2013           Family history    neg        Glaucoma meds    Timolol ou, xalatan ou        H/O adverse rxn to glaucoma drops    none        LASERS    SLT od 8/17/2017 (18-->10 - 15)   // SLT os 9/7/2017 19-->12        GLAUCOMA SURGERIES    none        OTHER EYE SURGERIES    PC IOL - sulcus OD // bag OS - Crear        CDR    0.95 w/ sup and inf loss /0.85 with inf loss         Tbase    20/20        Tmax    20/20        Ttarget    16/16           HVF    3 test 2010 to 2018 -  gen dep - SALT od. / gen dep Sup paracentral        Gonio    +3 ou        CCT    502/501 (thin ou)        OCT    3 test 2010 to 2018  - RNFL - dec I od / nl os        HRT    2 test 2013 to 2017 - [place] off od // dec inf os /// CDR xx [place] off od //0.72 os        Disc photos    2010. 2016  - OIS     - Ttoday  11/12  - Test done today - HVF / DFE / OCT    2.    NAOMI - SPK ou   AT's prn    3.   PC IOL ou - outside doctor - Crear   Sulcus od   Bag os   S/P small but central yag capsulotomy od // NO PCO - os - capsule intact     4.   HTN - ? Hx of retinopathy. BP control     5.    Myopia / presbyopia (monovision IOL's)   Glasses - Martinez - 3/13/2013   -3.00 od and plano os    PLAN:  Changed combigan ou  Cont latanoprost ou   Cont Azopt  TID OU     Good response to SLT OU  SLT OD 8/17/2017 - pre slt iop 18 ---> 10  SLT OS - 9/7/2017 - pre slt Iop 19 --->12    Again encouraged compliance for glaucoma management and treatment    Follow up 4 mo, HRT AR/MR- check for NEW GLASSES- S/P yag cap od and clear PC os   Has some degree of monovision with IOL's // -3.00 od and plano os in past       I have seen and personally examined the patient.  I agree with the findings, assessment and plan of the resident and/or fellow.     Brenda Ceja MD

## 2018-03-31 ENCOUNTER — LAB VISIT (OUTPATIENT)
Dept: LAB | Facility: HOSPITAL | Age: 83
End: 2018-03-31
Attending: INTERNAL MEDICINE
Payer: MEDICARE

## 2018-03-31 DIAGNOSIS — E53.8 VITAMIN B12 DEFICIENCY: ICD-10-CM

## 2018-03-31 DIAGNOSIS — E03.9 HYPOTHYROIDISM, UNSPECIFIED TYPE: ICD-10-CM

## 2018-03-31 LAB
ALBUMIN SERPL BCP-MCNC: 3.4 G/DL
ALP SERPL-CCNC: 80 U/L
ALT SERPL W/O P-5'-P-CCNC: 7 U/L
ANION GAP SERPL CALC-SCNC: 12 MMOL/L
AST SERPL-CCNC: 14 U/L
BASOPHILS # BLD AUTO: 0.02 K/UL
BASOPHILS NFR BLD: 0.5 %
BILIRUB SERPL-MCNC: 1.7 MG/DL
BUN SERPL-MCNC: 9 MG/DL
CALCIUM SERPL-MCNC: 9.7 MG/DL
CHLORIDE SERPL-SCNC: 101 MMOL/L
CO2 SERPL-SCNC: 32 MMOL/L
CREAT SERPL-MCNC: 1 MG/DL
DIFFERENTIAL METHOD: ABNORMAL
EOSINOPHIL # BLD AUTO: 0.2 K/UL
EOSINOPHIL NFR BLD: 4.2 %
ERYTHROCYTE [DISTWIDTH] IN BLOOD BY AUTOMATED COUNT: 14.8 %
EST. GFR  (AFRICAN AMERICAN): >60 ML/MIN/1.73 M^2
EST. GFR  (NON AFRICAN AMERICAN): 53 ML/MIN/1.73 M^2
GLUCOSE SERPL-MCNC: 92 MG/DL
HCT VFR BLD AUTO: 39 %
HGB BLD-MCNC: 12.4 G/DL
LYMPHOCYTES # BLD AUTO: 1.1 K/UL
LYMPHOCYTES NFR BLD: 28.8 %
MCH RBC QN AUTO: 28.2 PG
MCHC RBC AUTO-ENTMCNC: 31.8 G/DL
MCV RBC AUTO: 89 FL
MONOCYTES # BLD AUTO: 0.3 K/UL
MONOCYTES NFR BLD: 8.7 %
NEUTROPHILS # BLD AUTO: 2.2 K/UL
NEUTROPHILS NFR BLD: 57.8 %
PLATELET # BLD AUTO: 261 K/UL
PMV BLD AUTO: 10.3 FL
POTASSIUM SERPL-SCNC: 3.2 MMOL/L
PROT SERPL-MCNC: 7.4 G/DL
RBC # BLD AUTO: 4.4 M/UL
SODIUM SERPL-SCNC: 145 MMOL/L
T4 FREE SERPL-MCNC: 1.07 NG/DL
TSH SERPL DL<=0.005 MIU/L-ACNC: 31.62 UIU/ML
VIT B12 SERPL-MCNC: 442 PG/ML
WBC # BLD AUTO: 3.79 K/UL

## 2018-03-31 PROCEDURE — 36415 COLL VENOUS BLD VENIPUNCTURE: CPT

## 2018-03-31 PROCEDURE — 85025 COMPLETE CBC W/AUTO DIFF WBC: CPT

## 2018-03-31 PROCEDURE — 82607 VITAMIN B-12: CPT

## 2018-03-31 PROCEDURE — 84439 ASSAY OF FREE THYROXINE: CPT

## 2018-03-31 PROCEDURE — 80053 COMPREHEN METABOLIC PANEL: CPT

## 2018-03-31 PROCEDURE — 84443 ASSAY THYROID STIM HORMONE: CPT

## 2018-04-01 ENCOUNTER — EXTERNAL CHRONIC CARE MANAGEMENT (OUTPATIENT)
Dept: PRIMARY CARE CLINIC | Facility: CLINIC | Age: 83
End: 2018-04-01
Payer: MEDICARE

## 2018-04-30 PROCEDURE — 99490 CHRNC CARE MGMT STAFF 1ST 20: CPT | Mod: S$PBB,,, | Performed by: INTERNAL MEDICINE

## 2018-04-30 PROCEDURE — 99490 CHRNC CARE MGMT STAFF 1ST 20: CPT | Mod: PBBFAC | Performed by: INTERNAL MEDICINE

## 2018-05-01 ENCOUNTER — EXTERNAL CHRONIC CARE MANAGEMENT (OUTPATIENT)
Dept: PRIMARY CARE CLINIC | Facility: CLINIC | Age: 83
End: 2018-05-01
Payer: MEDICARE

## 2018-05-17 RX ORDER — LISINOPRIL AND HYDROCHLOROTHIAZIDE 20; 25 MG/1; MG/1
TABLET ORAL
Qty: 90 TABLET | Refills: 0 | Status: SHIPPED | OUTPATIENT
Start: 2018-05-17 | End: 2018-09-26 | Stop reason: SDUPTHER

## 2018-05-31 PROCEDURE — 99490 CHRNC CARE MGMT STAFF 1ST 20: CPT | Mod: S$PBB,,, | Performed by: INTERNAL MEDICINE

## 2018-05-31 PROCEDURE — 99490 CHRNC CARE MGMT STAFF 1ST 20: CPT | Mod: PBBFAC | Performed by: INTERNAL MEDICINE

## 2018-06-30 ENCOUNTER — EXTERNAL CHRONIC CARE MANAGEMENT (OUTPATIENT)
Dept: PRIMARY CARE CLINIC | Facility: CLINIC | Age: 83
End: 2018-06-30
Payer: MEDICARE

## 2018-06-30 PROCEDURE — 99490 CHRNC CARE MGMT STAFF 1ST 20: CPT | Mod: PBBFAC | Performed by: INTERNAL MEDICINE

## 2018-06-30 PROCEDURE — 99490 CHRNC CARE MGMT STAFF 1ST 20: CPT | Mod: S$PBB,,, | Performed by: INTERNAL MEDICINE

## 2018-07-31 ENCOUNTER — EXTERNAL CHRONIC CARE MANAGEMENT (OUTPATIENT)
Dept: PRIMARY CARE CLINIC | Facility: CLINIC | Age: 83
End: 2018-07-31
Payer: MEDICARE

## 2018-07-31 PROCEDURE — 99490 CHRNC CARE MGMT STAFF 1ST 20: CPT | Mod: S$PBB,,, | Performed by: INTERNAL MEDICINE

## 2018-07-31 PROCEDURE — 99490 CHRNC CARE MGMT STAFF 1ST 20: CPT | Mod: PBBFAC | Performed by: INTERNAL MEDICINE

## 2018-08-31 ENCOUNTER — EXTERNAL CHRONIC CARE MANAGEMENT (OUTPATIENT)
Dept: PRIMARY CARE CLINIC | Facility: CLINIC | Age: 83
End: 2018-08-31
Payer: MEDICARE

## 2018-08-31 PROCEDURE — 99490 CHRNC CARE MGMT STAFF 1ST 20: CPT | Mod: S$PBB,,, | Performed by: INTERNAL MEDICINE

## 2018-08-31 PROCEDURE — 99490 CHRNC CARE MGMT STAFF 1ST 20: CPT | Mod: PBBFAC | Performed by: INTERNAL MEDICINE

## 2018-09-06 ENCOUNTER — OFFICE VISIT (OUTPATIENT)
Dept: OPHTHALMOLOGY | Facility: CLINIC | Age: 83
End: 2018-09-06
Payer: MEDICARE

## 2018-09-06 ENCOUNTER — CLINICAL SUPPORT (OUTPATIENT)
Dept: OPHTHALMOLOGY | Facility: CLINIC | Age: 83
End: 2018-09-06
Payer: MEDICARE

## 2018-09-06 DIAGNOSIS — H40.89 OTHER GLAUCOMA OF BOTH EYES: ICD-10-CM

## 2018-09-06 DIAGNOSIS — I10 ESSENTIAL HYPERTENSION: ICD-10-CM

## 2018-09-06 DIAGNOSIS — H40.1133 PRIMARY OPEN ANGLE GLAUCOMA OF BOTH EYES, SEVERE STAGE: ICD-10-CM

## 2018-09-06 DIAGNOSIS — H52.4 PRESBYOPIA: ICD-10-CM

## 2018-09-06 DIAGNOSIS — Z96.1 PSEUDOPHAKIA: ICD-10-CM

## 2018-09-06 DIAGNOSIS — H40.1133 PRIMARY OPEN ANGLE GLAUCOMA OF BOTH EYES, SEVERE STAGE: Primary | ICD-10-CM

## 2018-09-06 DIAGNOSIS — H04.123 DRY EYES, BILATERAL: ICD-10-CM

## 2018-09-06 PROCEDURE — 92133 CPTRZD OPH DX IMG PST SGM ON: CPT | Mod: PBBFAC

## 2018-09-06 PROCEDURE — 99212 OFFICE O/P EST SF 10 MIN: CPT | Mod: PBBFAC,25 | Performed by: OPHTHALMOLOGY

## 2018-09-06 PROCEDURE — 92133 CPTRZD OPH DX IMG PST SGM ON: CPT | Mod: 26,S$PBB,, | Performed by: OPHTHALMOLOGY

## 2018-09-06 PROCEDURE — 92012 INTRM OPH EXAM EST PATIENT: CPT | Mod: S$PBB,,, | Performed by: OPHTHALMOLOGY

## 2018-09-06 PROCEDURE — 99999 PR PBB SHADOW E&M-EST. PATIENT-LVL II: CPT | Mod: PBBFAC,,, | Performed by: OPHTHALMOLOGY

## 2018-09-06 RX ORDER — LATANOPROST 50 UG/ML
1 SOLUTION/ DROPS OPHTHALMIC NIGHTLY
Qty: 1 BOTTLE | Refills: 12 | Status: SHIPPED | OUTPATIENT
Start: 2018-09-06 | End: 2020-04-27

## 2018-09-06 RX ORDER — BRINZOLAMIDE 10 MG/ML
1 SUSPENSION/ DROPS OPHTHALMIC 3 TIMES DAILY
Qty: 10 ML | Refills: 12 | Status: SHIPPED | OUTPATIENT
Start: 2018-09-06 | End: 2020-04-27

## 2018-09-06 RX ORDER — BRIMONIDINE TARTRATE AND TIMOLOL MALEATE 2; 5 MG/ML; MG/ML
1 SOLUTION OPHTHALMIC 2 TIMES DAILY
Qty: 10 ML | Refills: 12 | Status: SHIPPED | OUTPATIENT
Start: 2018-09-06 | End: 2020-04-27

## 2018-09-06 NOTE — PROGRESS NOTES
Assessment /Plan     For exam results, see Encounter Report.    Primary open angle glaucoma of both eyes, severe stage    Dry eyes, bilateral    Presbyopia - Both Eyes    Pseudophakia - Both Eyes    Essential hypertension            LOST TO F/U X 2 YEARS 8/2014 TO 8/2016     1.   POAG OD > OS - severe stage   Pt Dx with glaucoma by Dr. Carias   First HVF 2010   First photos 2010   First [facility] eye visit 2010   H/O poor compliance lost to F/U 12/2010 to 3/2013           Family history    neg        Glaucoma meds    Timolol ou, xalatan ou        H/O adverse rxn to glaucoma drops    none        LASERS    SLT od 8/17/2017 (18-->10 - 15)   // SLT os 9/7/2017 19-->12        GLAUCOMA SURGERIES    none        OTHER EYE SURGERIES    PC IOL - sulcus OD // bag OS - Crear        CDR    0.95 w/ sup and inf loss /0.85 with inf loss         Tbase    20/20        Tmax    20/20        Ttarget    16/16           HVF    3 test 2010 to 2018 -  gen dep - SALT od. / gen dep Sup paracentral        Gonio    +3 ou        CCT    502/501 (thin ou)        OCT    3 test 2010 to 2018  - RNFL - dec I od / nl os        HRT    3 test 2013 to 2018 - MR -  Dec. S/N/I, bord T od // dec. I os /// CDR 0.89 od // 0.71 os        Disc photos    2010. 2016  - OIS     - Ttoday  18/16  - Test done today - HRT    2.    NAOMI - SPK ou   AT's prn    3.   PC IOL ou - outside doctor - Crear   Sulcus od   Bag os   S/P small but central yag capsulotomy od // NO PCO - os - capsule intact     4.   HTN - ? Hx of retinopathy. BP control     5.   Myopia / presbyopia (monovision IOL's)   Glasses - Martinez - 3/13/2013   -3.00 od and plano os    PLAN:  Cont  combigan ou  Cont latanoprost ou   Cont Azopt  TID OU     Good response to SLT OU  SLT OD 8/17/2017 - pre slt iop 18 ---> 10  SLT OS - 9/7/2017 - pre slt Iop 19 --->12    Again encouraged compliance for glaucoma management and treatment    Follow up 4 mo, gonio     I have seen and personally examined the patient.  I  agree with the findings, assessment and plan of the resident and/or fellow.     Brenda Ceja MD

## 2018-09-26 RX ORDER — LISINOPRIL AND HYDROCHLOROTHIAZIDE 20; 25 MG/1; MG/1
TABLET ORAL
Qty: 90 TABLET | Refills: 0 | Status: SHIPPED | OUTPATIENT
Start: 2018-09-26 | End: 2018-10-31 | Stop reason: SDUPTHER

## 2018-09-30 ENCOUNTER — EXTERNAL CHRONIC CARE MANAGEMENT (OUTPATIENT)
Dept: PRIMARY CARE CLINIC | Facility: CLINIC | Age: 83
End: 2018-09-30
Payer: MEDICARE

## 2018-09-30 PROCEDURE — 99490 CHRNC CARE MGMT STAFF 1ST 20: CPT | Mod: PBBFAC | Performed by: INTERNAL MEDICINE

## 2018-09-30 PROCEDURE — 99490 CHRNC CARE MGMT STAFF 1ST 20: CPT | Mod: S$PBB,,, | Performed by: INTERNAL MEDICINE

## 2018-10-09 ENCOUNTER — HOSPITAL ENCOUNTER (EMERGENCY)
Facility: HOSPITAL | Age: 83
Discharge: HOME | End: 2018-10-09
Attending: EMERGENCY MEDICINE
Payer: MEDICARE

## 2018-10-09 VITALS
SYSTOLIC BLOOD PRESSURE: 160 MMHG | WEIGHT: 152 LBS | BODY MASS INDEX: 26.09 KG/M2 | HEART RATE: 68 BPM | RESPIRATION RATE: 18 BRPM | DIASTOLIC BLOOD PRESSURE: 74 MMHG | OXYGEN SATURATION: 99 % | TEMPERATURE: 97 F

## 2018-10-09 DIAGNOSIS — M79.603 ARM PAIN: Primary | ICD-10-CM

## 2018-10-09 DIAGNOSIS — R07.9 CHEST PAIN: ICD-10-CM

## 2018-10-09 LAB
ALBUMIN SERPL BCP-MCNC: 3.1 G/DL
ALP SERPL-CCNC: 83 U/L
ALT SERPL W/O P-5'-P-CCNC: 5 U/L
ANION GAP SERPL CALC-SCNC: 9 MMOL/L
AST SERPL-CCNC: 13 U/L
BASOPHILS # BLD AUTO: 0.03 K/UL
BASOPHILS NFR BLD: 0.8 %
BILIRUB SERPL-MCNC: 1.2 MG/DL
BUN SERPL-MCNC: 13 MG/DL
CALCIUM SERPL-MCNC: 9.4 MG/DL
CHLORIDE SERPL-SCNC: 103 MMOL/L
CO2 SERPL-SCNC: 32 MMOL/L
CREAT SERPL-MCNC: 1 MG/DL
DIFFERENTIAL METHOD: ABNORMAL
EOSINOPHIL # BLD AUTO: 0.1 K/UL
EOSINOPHIL NFR BLD: 3 %
ERYTHROCYTE [DISTWIDTH] IN BLOOD BY AUTOMATED COUNT: 14.1 %
EST. GFR  (AFRICAN AMERICAN): >60 ML/MIN/1.73 M^2
EST. GFR  (NON AFRICAN AMERICAN): 53 ML/MIN/1.73 M^2
GLUCOSE SERPL-MCNC: 84 MG/DL
HCT VFR BLD AUTO: 36.3 %
HGB BLD-MCNC: 11.5 G/DL
IMM GRANULOCYTES # BLD AUTO: 0.01 K/UL
IMM GRANULOCYTES NFR BLD AUTO: 0.3 %
LYMPHOCYTES # BLD AUTO: 1.4 K/UL
LYMPHOCYTES NFR BLD: 38.6 %
MCH RBC QN AUTO: 27.7 PG
MCHC RBC AUTO-ENTMCNC: 31.7 G/DL
MCV RBC AUTO: 88 FL
MONOCYTES # BLD AUTO: 0.3 K/UL
MONOCYTES NFR BLD: 7.8 %
NEUTROPHILS # BLD AUTO: 1.8 K/UL
NEUTROPHILS NFR BLD: 49.5 %
NRBC BLD-RTO: 0 /100 WBC
PLATELET # BLD AUTO: 218 K/UL
PMV BLD AUTO: 10.7 FL
POTASSIUM SERPL-SCNC: 3.2 MMOL/L
PROT SERPL-MCNC: 6.7 G/DL
RBC # BLD AUTO: 4.15 M/UL
SODIUM SERPL-SCNC: 144 MMOL/L
TROPONIN I SERPL DL<=0.01 NG/ML-MCNC: <0.006 NG/ML
TROPONIN I SERPL DL<=0.01 NG/ML-MCNC: <0.006 NG/ML
WBC # BLD AUTO: 3.7 K/UL

## 2018-10-09 PROCEDURE — 93010 ELECTROCARDIOGRAM REPORT: CPT | Mod: ,,, | Performed by: INTERNAL MEDICINE

## 2018-10-09 PROCEDURE — 99284 EMERGENCY DEPT VISIT MOD MDM: CPT | Mod: ,,, | Performed by: EMERGENCY MEDICINE

## 2018-10-09 PROCEDURE — 80053 COMPREHEN METABOLIC PANEL: CPT

## 2018-10-09 PROCEDURE — 99285 EMERGENCY DEPT VISIT HI MDM: CPT | Mod: 25

## 2018-10-09 PROCEDURE — 25000003 PHARM REV CODE 250: Performed by: EMERGENCY MEDICINE

## 2018-10-09 PROCEDURE — 85025 COMPLETE CBC W/AUTO DIFF WBC: CPT

## 2018-10-09 PROCEDURE — 84484 ASSAY OF TROPONIN QUANT: CPT

## 2018-10-09 RX ORDER — ASPIRIN 325 MG
325 TABLET ORAL
Status: COMPLETED | OUTPATIENT
Start: 2018-10-09 | End: 2018-10-09

## 2018-10-09 RX ADMIN — ASPIRIN 325 MG ORAL TABLET 325 MG: 325 PILL ORAL at 05:10

## 2018-10-09 NOTE — ED TRIAGE NOTES
Pt has left arm pain for 3 days with no injury. Denies chest pain or sob. Pain is a achy, intermittent pain. Pt has hx of tendonitis and arthritis and states this feels differently.

## 2018-10-09 NOTE — ED NOTES
LOC/ APPEARANCE: The patient is awake, alert and oriented x 4. Pt is speaking appropriately, no slurred speech. Patient resting comfortably and in no acute distress.   SKIN: Skin is warm, dry and intact. Capillary refill <3 seconds. No breakdown or brusing note. Pt has moist mucus membranes.   MUSCULOSKELETAL: Full range of motion present in all extremities. Hand  equal and leg strength strong bilaterally.   RESPIRATORY: Airway is open and patent. Respirations are even and non labored. Denies shortness of breath.   CARDIAC: Patient has regular rate and rhythm. No peripheral edema noted in extremities. Patient denies chest pain.  GI: Abdomen is soft and non tender. Normal bowel sounds in all four quadrants.   NEUROLOGIC: Eyes open spontaneously and facial expression symmetrical. Pt behavior appropriate to situation, and pt follows commands.  Pt reports sensation present in all extremities when touched with a finger. PERRLA.  : No complaints of frequency, burning, urgency or blood in the urine. No complaints of incontinence.      Pt complains of left arm pain. Pain is non re-producable when touching or moving arm. Pt denies injury. Denies cp or sob.

## 2018-10-09 NOTE — ED PROVIDER NOTES
Encounter Date: 10/9/2018    SCRIBE #1 NOTE: I, Elizabeth Martin, am scribing for, and in the presence of,  Dr. Amaral. I have scribed the entire note.       History     Chief Complaint   Patient presents with    Arm Pain     left arm pain X 3 days; comes and goes     Time patient was seen by the provider: 4:53 PM      The patient is a 83 y.o. female who presents to the ED with a complaint of left arm pain for 3 days. The patient describes pain as sore, she states pain is intermittent, lasting for about 15-20 minutes. She denies anything that makes it worse or better. Patient reports worse episode of pain was this morning at 5:00 AM, she took an Advil giving relief. She is in no pain now. Denies nausea, tingling.       The history is provided by the patient and medical records.     Review of patient's allergies indicates:   Allergen Reactions    Pcn [penicillins]      Past Medical History:   Diagnosis Date    COPD (chronic obstructive pulmonary disease) 3/8/2013    CVA (cerebral infarction) 3/8/2013    Dry eye syndrome     Glaucoma     H/O: pneumothorax 3/8/2013    Spontaneous in 2007    Hyperlipidemia 3/8/2013    Hypertension 3/8/2013    Hypothyroidism 3/8/2013    Osteopenia 3/8/2013     Past Surgical History:   Procedure Laterality Date    CATARACT EXTRACTION W/  INTRAOCULAR LENS IMPLANT Bilateral n/a    Dr. Carias    SELECTIVE LASER TRABECUPLASTY Bilateral 08/2017        TOTAL ABDOMINAL HYSTERECTOMY W/ BILATERAL SALPINGOOPHORECTOMY       Family History   Problem Relation Age of Onset    Stroke Mother     Cancer Brother         bone cancer    Heart attack Son     Alcohol abuse Brother     Aneurysm Brother     Hypertension Daughter     Glaucoma Son     Amblyopia Neg Hx     Blindness Neg Hx     Cataracts Neg Hx     Macular degeneration Neg Hx     Retinal detachment Neg Hx     Strabismus Neg Hx      Social History     Tobacco Use    Smoking status: Former Smoker    Smokeless  tobacco: Never Used    Tobacco comment: quit smoking in 2001   Substance Use Topics    Alcohol use: No    Drug use: No     Review of Systems   Constitutional: Negative for fever.   HENT: Negative for sore throat.    Respiratory: Negative for shortness of breath.    Cardiovascular: Negative for chest pain.   Gastrointestinal: Negative for nausea.   Genitourinary: Negative for dysuria.   Musculoskeletal: Negative for back pain.        +Left arm pain   Skin: Negative for rash.   Neurological: Negative for weakness.   Hematological: Does not bruise/bleed easily.       Physical Exam     Initial Vitals [10/09/18 1539]   BP Pulse Resp Temp SpO2   (!) 224/109 66 18 97.8 °F (36.6 °C) (!) 93 %      MAP       --         Physical Exam    Nursing note and vitals reviewed.    Appearance: No acute distress.  Skin: No rashes seen.  Good turgor.  No abrasions.  No ecchymoses.  Eyes: No conjunctival injection.  ENT: Oropharynx clear.    Chest: Clear to auscultation bilaterally.  Good air movement.  No wheezes.  No rhonchi.  Cardiovascular: Regular rate and rhythm.  No murmurs. No gallops. No rubs.  Abdomen: Soft.  Not distended.  Nontender.  No guarding.  No rebound.  Musculoskeletal: Good range of motion all joints.  No deformities.  Neck supple.  No meningismus.  Neurologic: Motor intact.  Sensation intact.  Cerebellar intact.  Cranial nerves intact.  Mental Status:  Alert and oriented x 3.  Appropriate, conversant.      ED Course   Procedures  Labs Reviewed   CBC W/ AUTO DIFFERENTIAL - Abnormal; Notable for the following components:       Result Value    WBC 3.70 (*)     Hemoglobin 11.5 (*)     Hematocrit 36.3 (*)     MCHC 31.7 (*)     All other components within normal limits   COMPREHENSIVE METABOLIC PANEL - Abnormal; Notable for the following components:    Potassium 3.2 (*)     CO2 32 (*)     Albumin 3.1 (*)     Total Bilirubin 1.2 (*)     ALT 5 (*)     eGFR if non  53.0 (*)     All other components within  normal limits   TROPONIN I   TROPONIN I          Imaging Results          X-Ray Chest AP Portable (Final result)  Result time 10/09/18 18:10:11    Final result by Sam Arreola MD (10/09/18 18:10:11)                 Impression:      No detrimental change or radiographic acute intrathoracic process seen on this single view.      Electronically signed by: Sam Arreola MD  Date:    10/09/2018  Time:    18:10             Narrative:    EXAMINATION:  XR CHEST AP PORTABLE    CLINICAL HISTORY:  Chest Pain;    TECHNIQUE:  Single frontal view of the chest was performed.    COMPARISON:  CT renal stone study 01/26/2018 and chest radiograph 02/28/2012, CT thorax 04/16/2007    FINDINGS:  The patient is somewhat rotated.  Grossly stable nodular densities project over the right lung base, likely corresponding to nodule seen on prior exams.  Biapical pleuroparenchymal scarring.  The lungs are symmetrically well expanded without large consolidation or definite new focal opacity.  No large pleural effusion or pneumothorax.  Cardiac silhouette is enlarged without evidence of failure.  Grossly stable mediastinal contours noting calcific atherosclerosis of the thoracic aorta.  No acute osseous process seen.  PA and lateral views can be obtained.                                 Medical Decision Making:   History:   Old Medical Records: I decided to obtain old medical records.  Initial Assessment:   83 y.o. Female patient with left upper arm pain that has happened intermittently in the last couple of days. Worse episode was at 5:00 AM this morning, she took an Advil and has not been in pain since. Nothing makes it worse or better. Movement of arm does nothing to change the pain. Needs cardiac rule out. I doubt pneumonia, pulmonary embolism, stroke.     7:14 PM  Initial lab work up is benign. Plan to discharge if troponin is normal. Chest XR no acute processes noted.  Clinical Tests:   Lab Tests: Ordered and Reviewed  Radiological Study:  Ordered and Reviewed  Medical Tests: Ordered and Reviewed            Scribe Attestation:   Scribe #1: I performed the above scribed service and the documentation accurately describes the services I performed. I attest to the accuracy of the note.               Clinical Impression:   The primary encounter diagnosis was Arm pain. A diagnosis of Chest pain was also pertinent to this visit.                             Newton Amaral MD  10/15/18 9724

## 2018-10-10 ENCOUNTER — TELEPHONE (OUTPATIENT)
Dept: INTERNAL MEDICINE | Facility: CLINIC | Age: 83
End: 2018-10-10

## 2018-10-10 NOTE — ED NOTES
Bed rails are up and call light is within patient reach. Pt waiting on MD for results. Pt given another blanket.  Has no needs at this time.  Family at bedside.

## 2018-10-10 NOTE — PROVIDER PROGRESS NOTES - EMERGENCY DEPT.
Encounter Date: 10/9/2018    ED Physician Progress Notes        Physician Note:   20:36 - Received from Dr. Amaral, pending repeat Troponin. Patient is an 83 year old female presenting with intermittent LUE pain for 3 days. Repeat Troponin is negative, patient is stable for discharge at this time.

## 2018-10-10 NOTE — ED NOTES
Discharge instructions given to and reviewed with patient and family  Patient verbalizes understanding  Patient denies pain, chest pain and shortness of breath  All belongings sent with patient

## 2018-10-10 NOTE — TELEPHONE ENCOUNTER
Spoke with daughter pt went to ER with arm pain. Er did cardiac work up everything came back normal. ER states pain in arm could be related to arthritis or inflammation. Advised to take tylenol or ibuprofen. Daughter would like PCP recommendation? The ibuprofen does help with the pain. Does pt need to come in for ER f/u appointment.      Please advise

## 2018-10-10 NOTE — TELEPHONE ENCOUNTER
----- Message from Florentin Currie sent at 10/10/2018  3:44 PM CDT -----  Contact: self/[phone]  Pt was recently in the ER on yesterday. Pt states that when she was discharged she was told to go and speak with her PCP about the issues she had. Please advise.        Thanks

## 2018-10-11 NOTE — TELEPHONE ENCOUNTER
Yes, she needs an er follow up - book for next week.  She can take tylenol 500 mg 2 tablets three times daily for arm pain.

## 2018-10-15 ENCOUNTER — TELEPHONE (OUTPATIENT)
Dept: INTERNAL MEDICINE | Facility: CLINIC | Age: 83
End: 2018-10-15

## 2018-10-15 ENCOUNTER — HOSPITAL ENCOUNTER (OUTPATIENT)
Dept: RADIOLOGY | Facility: HOSPITAL | Age: 83
Discharge: HOME | End: 2018-10-15
Attending: NURSE PRACTITIONER
Payer: MEDICARE

## 2018-10-15 ENCOUNTER — OFFICE VISIT (OUTPATIENT)
Dept: INTERNAL MEDICINE | Facility: CLINIC | Age: 83
End: 2018-10-15
Payer: MEDICARE

## 2018-10-15 VITALS
WEIGHT: 154.31 LBS | BODY MASS INDEX: 26.49 KG/M2 | HEART RATE: 74 BPM | TEMPERATURE: 98 F | OXYGEN SATURATION: 95 % | SYSTOLIC BLOOD PRESSURE: 140 MMHG | DIASTOLIC BLOOD PRESSURE: 72 MMHG

## 2018-10-15 DIAGNOSIS — Z09 HOSPITAL DISCHARGE FOLLOW-UP: ICD-10-CM

## 2018-10-15 DIAGNOSIS — M79.602 LEFT ARM PAIN: ICD-10-CM

## 2018-10-15 DIAGNOSIS — M79.602 LEFT ARM PAIN: Primary | ICD-10-CM

## 2018-10-15 PROCEDURE — 73060 X-RAY EXAM OF HUMERUS: CPT | Mod: 26,LT,, | Performed by: RADIOLOGY

## 2018-10-15 PROCEDURE — 99214 OFFICE O/P EST MOD 30 MIN: CPT | Mod: S$PBB,,, | Performed by: NURSE PRACTITIONER

## 2018-10-15 PROCEDURE — 99214 OFFICE O/P EST MOD 30 MIN: CPT | Mod: PBBFAC,25 | Performed by: NURSE PRACTITIONER

## 2018-10-15 PROCEDURE — 99999 PR PBB SHADOW E&M-EST. PATIENT-LVL IV: CPT | Mod: PBBFAC,,, | Performed by: NURSE PRACTITIONER

## 2018-10-15 PROCEDURE — 73060 X-RAY EXAM OF HUMERUS: CPT | Mod: TC,LT

## 2018-10-15 RX ORDER — DICLOFENAC SODIUM 10 MG/G
2 GEL TOPICAL 3 TIMES DAILY
Qty: 100 G | Refills: 0 | Status: SHIPPED | OUTPATIENT
Start: 2018-10-15 | End: 2019-11-12

## 2018-10-15 RX ORDER — PRAVASTATIN SODIUM 20 MG/1
TABLET ORAL
Qty: 90 TABLET | Refills: 4 | Status: SHIPPED | OUTPATIENT
Start: 2018-10-15 | End: 2019-11-12 | Stop reason: SDUPTHER

## 2018-10-15 NOTE — PROGRESS NOTES
"Subjective:       Patient ID: Michelle Campo is a 83 y.o. female.    Chief Complaint: Hospital Follow Up    HPI:  82 yo female that presents to clinic for ED follow up for upper left arm pain.  She is accompanied to clinic today by her daughter.    States that arm pain has been ongoing for about 9 days.  Was seen in the ED on 10/9/18 where her cardiac work up was negative.  Pain was thought to be related to either muscle strain or osteoarthritis.  Was instructed to take tylenol as needed for pain.  States that tylenol doesn't do anything for pain but has gotten better with taking advil.    States that her arm still continues to hurt.  States that pain is intermittent but states it is "worse at night and feels like it's in the bone."  States that pain is confined to her left upper arm with no radiation.  Movement or rest does not seem to make the pain better or worse.    Denies any fever, SOB, chest pain, n/v or dizziness.    Daughter reports that she had a similar episode of arm pain before and was told that it was "either arthritis or tendonitis."      Review of Systems   Constitutional: Negative for activity change, appetite change, fatigue and fever.   Respiratory: Negative for apnea, cough, shortness of breath and wheezing.    Cardiovascular: Negative for chest pain, palpitations and leg swelling.   Gastrointestinal: Negative for abdominal distention, abdominal pain, constipation, diarrhea, nausea and vomiting.   Musculoskeletal: Positive for arthralgias. Negative for myalgias, neck pain and neck stiffness.   Skin: Negative for color change and rash.   Neurological: Negative for dizziness, light-headedness, numbness and headaches.   Psychiatric/Behavioral: Negative for behavioral problems.       Objective:      Physical Exam   Constitutional: She is oriented to person, place, and time. She appears well-developed and well-nourished. No distress.   Neck: Normal range of motion. Neck supple. No thyromegaly present. "   Cardiovascular: Normal rate, regular rhythm, normal heart sounds and intact distal pulses.   No murmur heard.  Pulmonary/Chest: Effort normal and breath sounds normal. No stridor. No respiratory distress. She has no wheezes.   Musculoskeletal:        Left upper arm: She exhibits tenderness. She exhibits no bony tenderness, no swelling, no edema, no deformity and no laceration.        Arms:  Patient exhibits good passive and active ROM.  There is no pain or tenderness with palpation to left upper arm.  Patient is able to fully flex and extend arm with no pain.  There is no pain with abduction of left arm to 90 degress but patient does state some mild discomfort with arm abducted to 180 degrees.   Lymphadenopathy:     She has no cervical adenopathy.   Neurological: She is alert and oriented to person, place, and time. No cranial nerve deficit or sensory deficit.   Skin: Skin is warm and dry. No erythema.   Psychiatric: Her behavior is normal.       Assessment:       1. Left arm pain    2. Hospital discharge follow-up        Plan:     1.  Left arm pain  -Patient reports continued pain in left upper arm since ED discharge.  -Cardiac work up was negative.  -Will obtain xray of left humerus today for further evaluation.  -Possible arthritis vs muscle strain.  -Will try topical diclofenac gel to arm tid prn as needed for pain to see if this provides relief.  -Encouraged the use of heat therapy to upper arm to help with pain relief.  -May need PT if arm pain continues to persist or further evaluation by rheumatology.

## 2018-10-16 ENCOUNTER — OUTPATIENT CASE MANAGEMENT (OUTPATIENT)
Dept: ADMINISTRATIVE | Facility: OTHER | Age: 83
End: 2018-10-16

## 2018-10-16 NOTE — TELEPHONE ENCOUNTER
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  There are no bone fractures or dislocations seen in her left arm.  The left arm pain is most likely related to the arthritis.  Thanks.     Moises      Attempted to call and inform Pt of results. Pt did not answer. Left VM.

## 2018-10-16 NOTE — PROGRESS NOTES
The following patient has been assigned to Magaly To RN with Outpatient Complex Care Management for high risk screening.    Reason: High Risk Recently Discharged    Please contact [facility] at [phone] with any questions.    Thank you,    Shannan Alvarado    Outpatient Case Management

## 2018-10-18 ENCOUNTER — OUTPATIENT CASE MANAGEMENT (OUTPATIENT)
Dept: ADMINISTRATIVE | Facility: OTHER | Age: 83
End: 2018-10-18

## 2018-10-18 NOTE — PROGRESS NOTES
Summary:  Spoke with pt regarding OPCM - Pt agreed to enroll and completed the assessment though at several times she could not remember certain things: pt did say she has high blood pressure but does not take at home - reporting she thinks her daughter has a cuff.  Things  Pt could not remember were:  What pt B/P runs or was at MD office, or when she had a CVA and seemed unsure of some of her medications but reported she takes each one during med rec.- did not know daughter Paris's phone number - however pt suggest OPCM RN speak with Paris as she know everything Pt eports that she does not have lung problems nor has she ever been told she has lung problems- denies any SOB -just c/o of being very tired when she walks - reporting she cannot walk far.  Pt states she does not have much of an appetite and sometimes her daughters have to make her eating -reports loosing 5 lbs in the last 2 months.   Pt states she has never been put on a diet but she doesn't eat salt - however she reports she does eat canned vegetables and loves canned corn as well as steamed.  Pt did say she has difficulty remembering certain things    Interventions:  Spoke with daughter Yelitza to get Paris's correct phone number  Attempted to call daughter Paris - left message regarding return call and pt enrolling in OPCM  [facility] and possible other identified support needs  Discussed hidden salt and can affect B/P and that fresh or frozen vegetables were better  Mailed info on low salt in a diet    Plan:  Follow-up with daughter - regarding memory - B/P, tired when walking, fall risk? And medication reconciliation correct?  Finish RN plan of care after speaking with daughter  Explore option of digital hypertension    [facility] Self[facility]Management Care Plan was developed with the patients/caregivers input and was based on identified barriers from todays assessment.  Goals were written today with the  patient/caregiver and the patient has agreed to work towards these goals to improve his/her overall well-being. Patient verbalized understanding of the care plan, goals, and all of today's instructions. Encouraged patient/caregiver to communicate with his/her physician and health care team about health conditions and the treatment plan.  Provided my contact information today and encouraged patient/caregiver to call me with any questions as needed.

## 2018-10-18 NOTE — PATIENT INSTRUCTIONS
Using Herbs and Spices    Herbs and spices add flavor to cooking without adding fat or sodium. That's why they're great for healthy cooking. Try these tips to help create tasty, healthy meals.  How much to use?  If a recipe calls for: 1 Tablespoon of fresh herbs You can use: 1 teaspoon of dried herbs Or: 1/4 teaspoon of powdered herbs   Tips for Getting the Most of Herbs and Spices  · Use kitchen mirza or a sharp knife to cut leaves of fresh herbs. Cutting the leaves finely will release the most flavor.  · When using whole spices, don't grind them until you need them. Crushing the berries and seeds with a mortar and pestle or grating whole nutmeg or cinnamon sticks just before adding them to your recipe will guarantee the most flavor.  · Dried herbs pack more flavor for the same quantity than fresh herbs, and powdered herbs are more potent than dried flakes. If you are using powdered herbs in a recipe that calls for fresh, you'll want to decrease the amount you add.  · When adding fresh or dried spices and herbs to cold recipes, such as dips or salad dressings, allow the food to sit in the refrigerator for at least a couple of hours before serving so the flavors can blend.  · Add fresh spices and herbs to hot dishes as close to serving time as possible for the most flavorful results. Dried herbs and spices should be added early in the cooking process to prevent a powdery taste.  · The longer dried herbs and spices sit in your cupboard without being used, the more flavor they lose. Whole spices last longer than ground or powdered spices. Store dried herbs and spices in a cool, dry, dark place. Keep powdered herbs and spices for no longer than a year.  Date Last Reviewed: 6/1/2015  © 4233-2106 [employer]. [address] [place] [address], [place] [address]. All rights reserved. This information is not intended as a substitute for professional medical care. Always follow your healthcare professional's  instructions.

## 2018-10-23 ENCOUNTER — OUTPATIENT CASE MANAGEMENT (OUTPATIENT)
Dept: ADMINISTRATIVE | Facility: OTHER | Age: 83
End: 2018-10-23

## 2018-10-23 NOTE — PROGRESS NOTES
Summary:  LCSW received a referral from OPCM-RNMagaly for advanced care planning. LCSW attempted to contact pt to complete SW assessment. After explaining the reason for call to the patient, pt respectfully declined SW services, stating she did not have any current needs at this time and declined needing additional info on Advanced Directive. Pt did not want to complete the SW assessment. LCSW will close out the SW referral at this time.     Interventions:  Collaborated with OPCM-RN on case closure  Case closure    Plan:  None

## 2018-10-23 NOTE — PROGRESS NOTES
Summary:  1st Attempt to complete Social Work Assessment for Outpatient Care Management; left message requesting a return call.  LCSW will reattempt at a later date.      Interventions:  None     Plan:  Make 2nd attempt on 10/25

## 2018-10-24 ENCOUNTER — OUTPATIENT CASE MANAGEMENT (OUTPATIENT)
Dept: ADMINISTRATIVE | Facility: OTHER | Age: 83
End: 2018-10-24

## 2018-10-24 ENCOUNTER — TELEPHONE (OUTPATIENT)
Dept: INTERNAL MEDICINE | Facility: CLINIC | Age: 83
End: 2018-10-24

## 2018-10-24 DIAGNOSIS — R26.9 GAIT ABNORMALITY: Primary | ICD-10-CM

## 2018-10-24 NOTE — TELEPHONE ENCOUNTER
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  There are no bone fractures or dislocations seen in her left arm.  The left arm pain is most likely related to the arthritis.  Thanks.     Moises    Attempted to call pt no answer Left VM.

## 2018-10-24 NOTE — TELEPHONE ENCOUNTER
----- Message from Ciera To RN sent at 10/24/2018  3:54 PM CDT -----  Contact: Magaly To RN OPC  Good Afternoon,    The above pt is enrolled in Outpatient Care Management.   I just got of the phone with pt's daughter -Paris.  Paris does believe that her mother is a fall risk due to age and that they had been considering getting the pt a cane.      Would Dr. Griffith please fax  order the pt a 4 pronged cane or cane to address safety issues?  Thank you for your time and attention in this matter.    Best Regards,    Magaly ([place]) Zuleika SHIELDS BSN RN   Outpatient Care Management  [phone]

## 2018-10-24 NOTE — PROGRESS NOTES
Summary:  Spoke with pt retarding follow-up pts denies any falls or SOB - pt replies everything is fine when asked specifics. Pt very hesitant and slow in answering Pt went and got daughter - Paris who had gotten off of work early today.  Pt. Daughter stated that recently pt has been forgetting t take her medications because she is getting up later - Pt's daughter states she and her sister put together her medications and check to see if she has taken them.  Pt's daughter states that they have not really been watch sodium intake but states pt's B/P is high when she forgets to take her meds -requested info on lowering NA intake - also confirmed what pt did say that she does not have much of an appetite.  Daughter did also state that she felt like pt was a fall risk and they had been considering a cane for the pt.      Interventions:  Completed and validated assessment with daughter as planned  Discussed sodium and high intake impact on B/P  Encouraged daughter to follow MD instructions regarding Ensure as a supplement  In basket messaged PCP for order for a 4 pronged cane for pt  Mailed additional  Info on low salt diet, reading labels, etc  Mailed pillbox  Referred back to [facility] for advanced care planning      Plan:  Follow-up with daughter for planning and education  Follow-up on cane order  Validate receipt of additional educational materials    Todays OPCM Self-Management Care Plan was developed with the patients/caregivers input and was based on identified barriers from todays assessment.  Goals were written today with the patient/caregiver and the patient has agreed to work towards these goals to improve his/her overall well-being. Patient verbalized understanding of the care plan, goals, and all of today's instructions. Encouraged patient/caregiver to communicate with his/her physician and health care team about health conditions and the treatment plan.  Provided my contact information today and  encouraged patient/caregiver to call me with any questions as needed.

## 2018-10-24 NOTE — PATIENT INSTRUCTIONS
Using Herbs and Spices    Herbs and spices add flavor to cooking without adding fat or sodium. That's why they're great for healthy cooking. Try these tips to help create tasty, healthy meals.  How much to use?  If a recipe calls for: 1 Tablespoon of fresh herbs You can use: 1 teaspoon of dried herbs Or: 1/4 teaspoon of powdered herbs   Tips for Getting the Most of Herbs and Spices  · Use kitchen mirza or a sharp knife to cut leaves of fresh herbs. Cutting the leaves finely will release the most flavor.  · When using whole spices, don't grind them until you need them. Crushing the berries and seeds with a mortar and pestle or grating whole nutmeg or cinnamon sticks just before adding them to your recipe will guarantee the most flavor.  · Dried herbs pack more flavor for the same quantity than fresh herbs, and powdered herbs are more potent than dried flakes. If you are using powdered herbs in a recipe that calls for fresh, you'll want to decrease the amount you add.  · When adding fresh or dried spices and herbs to cold recipes, such as dips or salad dressings, allow the food to sit in the refrigerator for at least a couple of hours before serving so the flavors can blend.  · Add fresh spices and herbs to hot dishes as close to serving time as possible for the most flavorful results. Dried herbs and spices should be added early in the cooking process to prevent a powdery taste.  · The longer dried herbs and spices sit in your cupboard without being used, the more flavor they lose. Whole spices last longer than ground or powdered spices. Store dried herbs and spices in a cool, dry, dark place. Keep powdered herbs and spices for no longer than a year.  Date Last Reviewed: 6/1/2015  © 4628-3669 [employer]. [address] [place] [address], [place] [address]. All rights reserved. This information is not intended as a substitute for professional medical care. Always follow your healthcare professional's  instructions.        Using Herbs and Spices    Herbs and spices add flavor to cooking without adding fat or sodium. That's why they're great for healthy cooking. Try these tips to help create tasty, healthy meals.  How much to use?  If a recipe calls for: 1 Tablespoon of fresh herbs You can use: 1 teaspoon of dried herbs Or: 1/4 teaspoon of powdered herbs   Tips for Getting the Most of Herbs and Spices  · Use kitchen mirza or a sharp knife to cut leaves of fresh herbs. Cutting the leaves finely will release the most flavor.  · When using whole spices, don't grind them until you need them. Crushing the berries and seeds with a mortar and pestle or grating whole nutmeg or cinnamon sticks just before adding them to your recipe will guarantee the most flavor.  · Dried herbs pack more flavor for the same quantity than fresh herbs, and powdered herbs are more potent than dried flakes. If you are using powdered herbs in a recipe that calls for fresh, you'll want to decrease the amount you add.  · When adding fresh or dried spices and herbs to cold recipes, such as dips or salad dressings, allow the food to sit in the refrigerator for at least a couple of hours before serving so the flavors can blend.  · Add fresh spices and herbs to hot dishes as close to serving time as possible for the most flavorful results. Dried herbs and spices should be added early in the cooking process to prevent a powdery taste.  · The longer dried herbs and spices sit in your cupboard without being used, the more flavor they lose. Whole spices last longer than ground or powdered spices. Store dried herbs and spices in a cool, dry, dark place. Keep powdered herbs and spices for no longer than a year.  Date Last Reviewed: 6/1/2015  © 5749-2743 [employer]. [address] [place] [address], [place] [address]. All rights reserved. This information is not intended as a substitute for professional medical care. Always follow your healthcare  professional's instructions.

## 2018-10-25 ENCOUNTER — OUTPATIENT CASE MANAGEMENT (OUTPATIENT)
Dept: ADMINISTRATIVE | Facility: OTHER | Age: 83
End: 2018-10-25

## 2018-10-25 NOTE — PROGRESS NOTES
Summary:  PCP's MA called - stated that cane had been ordered for the pt - but also that PCP would like to see pt and that they have been unable to reach pt's daughter-Paris- though they have left several messages- was requesting OPCM try to reach pts daughter and see if pt could be seen next Wednesday at 8 am.    Interventions:  Called number pt's daughter gave yesterday as the best place to reach her  - left message regarding cane ordered and that PCP would like to see pt next Wednesday at 8 am if possible  Messaged the LCSW regarding PCP request in case she speaks with pt's daughter, Paris today    Plan:  Follow up with PCP's MA as to able to contact  Follow-up next week    Todays OPCM Self-Management Care Plan was developed with the patients/caregivers input and was based on identified barriers from todays assessment.  Goals were written today with the patient/caregiver and the patient has agreed to work towards these goals to improve his/her overall well-being. Patient verbalized understanding of the care plan, goals, and all of today's instructions. Encouraged patient/caregiver to communicate with his/her physician and health care team about health conditions and the treatment plan.  Provided my contact information today and encouraged patient/caregiver to call me with any questions as needed.

## 2018-10-25 NOTE — PROGRESS NOTES
Summary:  1st Attempt to complete Social Work Assessment for Outpatient Care Management; left message with pt's daughter, aPris, requesting a return call.  LCSW will reattempt at a later date.      Interventions:  None    Plan:  Make 2nd attempt on 10/30

## 2018-10-29 ENCOUNTER — TELEPHONE (OUTPATIENT)
Dept: INTERNAL MEDICINE | Facility: CLINIC | Age: 83
End: 2018-10-29

## 2018-10-30 ENCOUNTER — OUTPATIENT CASE MANAGEMENT (OUTPATIENT)
Dept: ADMINISTRATIVE | Facility: OTHER | Age: 83
End: 2018-10-30

## 2018-10-30 DIAGNOSIS — E78.5 HYPERLIPIDEMIA, UNSPECIFIED HYPERLIPIDEMIA TYPE: Primary | ICD-10-CM

## 2018-10-30 NOTE — PROGRESS NOTES
Summary:  2nd attempt to complete Social Work Assessment for OPCM; left message requesting a return call. LCSW will mail a letter with contact information requesting a return call.  OPCM RN notified.      Interventions:  Collaborated with OPCM-RN on subsequent missed visits    Plan:  Make 3rd attempt on 11/9

## 2018-10-30 NOTE — PROGRESS NOTES
"Summary:  Spoke briefly with pt - pt states she has a good appetite - Pt states that she is doing fine.  Reports that daughter Paris is on a cruise and she is the one with the B/P cuff so pt has not been able to have her blood pressure taken.  Pt is wit other daughter Yelitza who does not have a B/P cuff.  Pt denies any falls, swelling of arms or legs, or SOB.     Interventions:  Discussed keeping salt intake low - unsure if pt is understands as response is OK and "i'm doing OK"  Informed pt that PCP wanted to see her -ask is Paris would call me when she gets back    Plan:  Call next week when daughter back from cruise    Todays OPCM Self-Management Care Plan was developed with the patients/caregivers input and was based on identified barriers from todays assessment.  Goals were written today with the patient/caregiver and the patient has agreed to work towards these goals to improve his/her overall well-being. Patient verbalized understanding of the care plan, goals, and all of today's instructions. Encouraged patient/caregiver to communicate with his/her physician and health care team about health conditions and the treatment plan.  Provided my contact information today and encouraged patient/caregiver to call me with any questions as needed.  "

## 2018-10-30 NOTE — LETTER
October 30, 2018    Michelle Campo  [address] [place] [address]             [facility]  [address] [place] [address] Dear  Ms. Campo:    I am writing from the Outpatient Complex Care Management Department at [facility].  I received a referral from Ciera To RN (ginny) to contact you regarding any needs you may have.  I have been unable to reach you by phone.   Please contact me if you would like to discuss any needs you may have.    I can be reached at [phone] Monday thru Friday from 8:00am to 4:30pm.  [facility] also has a program with a nurse available 24/7 to answer questions or provide medical advice.  [facility] can be reached at [phone].    Sincerely,         Roopa Fagan LCSW

## 2018-10-31 ENCOUNTER — OFFICE VISIT (OUTPATIENT)
Dept: INTERNAL MEDICINE | Facility: CLINIC | Age: 83
End: 2018-10-31
Payer: MEDICARE

## 2018-10-31 ENCOUNTER — HOSPITAL ENCOUNTER (OUTPATIENT)
Dept: RADIOLOGY | Facility: HOSPITAL | Age: 83
Discharge: HOME | End: 2018-10-31
Attending: INTERNAL MEDICINE
Payer: MEDICARE

## 2018-10-31 ENCOUNTER — IMMUNIZATION (OUTPATIENT)
Dept: INTERNAL MEDICINE | Facility: CLINIC | Age: 83
End: 2018-10-31
Payer: MEDICARE

## 2018-10-31 VITALS
WEIGHT: 154.19 LBS | HEIGHT: 64 IN | OXYGEN SATURATION: 98 % | BODY MASS INDEX: 26.32 KG/M2 | DIASTOLIC BLOOD PRESSURE: 80 MMHG | SYSTOLIC BLOOD PRESSURE: 110 MMHG | HEART RATE: 70 BPM

## 2018-10-31 DIAGNOSIS — R79.9 ABNORMAL BLOOD CHEMISTRY: ICD-10-CM

## 2018-10-31 DIAGNOSIS — D64.9 ANEMIA, UNSPECIFIED TYPE: ICD-10-CM

## 2018-10-31 DIAGNOSIS — E53.8 VITAMIN B12 DEFICIENCY: ICD-10-CM

## 2018-10-31 DIAGNOSIS — E78.5 HYPERLIPIDEMIA, UNSPECIFIED HYPERLIPIDEMIA TYPE: ICD-10-CM

## 2018-10-31 DIAGNOSIS — M85.80 OSTEOPENIA, UNSPECIFIED LOCATION: ICD-10-CM

## 2018-10-31 DIAGNOSIS — E03.9 HYPOTHYROIDISM, UNSPECIFIED TYPE: ICD-10-CM

## 2018-10-31 DIAGNOSIS — Z12.31 SCREENING MAMMOGRAM, ENCOUNTER FOR: ICD-10-CM

## 2018-10-31 DIAGNOSIS — R41.3 MEMORY LOSS: ICD-10-CM

## 2018-10-31 DIAGNOSIS — E55.9 VITAMIN D DEFICIENCY DISEASE: ICD-10-CM

## 2018-10-31 DIAGNOSIS — I10 ESSENTIAL HYPERTENSION: Primary | ICD-10-CM

## 2018-10-31 PROCEDURE — 77067 SCR MAMMO BI INCL CAD: CPT | Mod: TC

## 2018-10-31 PROCEDURE — 90662 IIV NO PRSV INCREASED AG IM: CPT | Mod: PBBFAC

## 2018-10-31 PROCEDURE — 99999 PR PBB SHADOW E&M-EST. PATIENT-LVL III: CPT | Mod: PBBFAC,,, | Performed by: INTERNAL MEDICINE

## 2018-10-31 PROCEDURE — 99213 OFFICE O/P EST LOW 20 MIN: CPT | Mod: PBBFAC,25 | Performed by: INTERNAL MEDICINE

## 2018-10-31 PROCEDURE — 77067 SCR MAMMO BI INCL CAD: CPT | Mod: 26,,, | Performed by: RADIOLOGY

## 2018-10-31 PROCEDURE — 96372 THER/PROPH/DIAG INJ SC/IM: CPT | Mod: PBBFAC

## 2018-10-31 PROCEDURE — 99214 OFFICE O/P EST MOD 30 MIN: CPT | Mod: S$PBB,,, | Performed by: INTERNAL MEDICINE

## 2018-10-31 RX ORDER — CYANOCOBALAMIN 1000 UG/ML
1000 INJECTION, SOLUTION INTRAMUSCULAR; SUBCUTANEOUS
Status: COMPLETED | OUTPATIENT
Start: 2018-10-31 | End: 2018-10-31

## 2018-10-31 RX ORDER — DONEPEZIL HYDROCHLORIDE 10 MG/1
10 TABLET, FILM COATED ORAL DAILY
Qty: 90 TABLET | Refills: 4 | Status: SHIPPED | OUTPATIENT
Start: 2018-10-31 | End: 2019-11-12 | Stop reason: SDUPTHER

## 2018-10-31 RX ORDER — FOLIC ACID/MULTIVIT,IRON,MINER 0.4MG-18MG
1 TABLET ORAL DAILY
COMMUNITY
End: 2022-08-02

## 2018-10-31 RX ORDER — LISINOPRIL AND HYDROCHLOROTHIAZIDE 20; 25 MG/1; MG/1
1 TABLET ORAL DAILY
Qty: 90 TABLET | Refills: 4 | Status: SHIPPED | OUTPATIENT
Start: 2018-10-31 | End: 2019-11-12 | Stop reason: SDUPTHER

## 2018-10-31 RX ADMIN — CYANOCOBALAMIN 1000 MCG: 1000 INJECTION, SOLUTION INTRAMUSCULAR at 09:10

## 2018-10-31 NOTE — PROGRESS NOTES
CHIEF COMPLAINT: Follow up of hypertension, hyperlipidemia, osteoporosis, and hypothyroidism.     HISTORY OF PRESENT ILLNESS: This is a 83-year-old woman who presents with her daughter for above.     She was in the ED on 10/9/18 for left arm pain. Pain resolved with diclofenac gel. No neck pain.  She takes t ylenol one tablet 2-3 times a week which helps her aches and pains.     She has been doing well. She is taking lisinopril HCT 20/25 one tablet daily. She denies any chest pain, shortness of breath, dizziness, blurred vision, headache, joint pain or muscle pain from the pravastatin 20 mg daily. She has been on levothyroxine 112 mcg daily. She denies any fatigue. She denies any fever, chills, nausea, vomiting, constipation, diarrhea. Vision is good. Appetite has been good. She denies any dysuria, hematuria, anxiety, depression, insomnia. Memory is ok Aricept 10 mg daily. HEr daughter states she has some memory loss which is stable on the aricept. She is also taking Vitamin D 400 units daily.     PAST MEDICAL HISTORY:   1. Hypertension.   2. Hyperlipidemia.   3. Status post stroke November 2001, hospitalized at [facility], had left facial drop and left-sided weakness which have since resolved.   4. COPD.   5. Spontaneous right pneumothorax April 2007, resolved with chest tube.   6. Shingles in 2007.   7. History of elevated blood sugars.   8. Glaucoma, followed by Dr. Rees.   9. Hyperthyroidism, status post radioactive iodine 10/2/2008.   10. Reflux.   11. Vitamin B12 deficiency    PAST SURGICAL HISTORY: Hysterectomy. She had her ovaries removed.      Social history - she has 10 children - 6 girls and 4 boys.     MEDICATIONS AND ALLERGIES: Updated in epic     PHYSICAL EXAMINATION:      GENERAL: Alert, oriented. No apparent distress. Affect within normal   limits.   CONJUNCTIVAE: Anicteric.   TYMPANIC MEMBRANES: Clear.   OROPHARYNX: Clear.   NECK: Supple.   RESPIRATORY: Effort normal.   LUNGS: Clear to auscultation.    HEART: Regular rate and rhythm without murmurs, gallops, or rubs.   No lower extremity edema.    ABDOMEN: soft, non distended, non tender, bowel sounds present, no hepatosplenomgaly       ASSESSMENT AND PLAN:   1. Hypertension-stable  2. Hyperlipidemia-on pravastatin  3. Hypothyroidism-on synthroid to 112 mcg daily  4. Osteopenia- done 7/2017  5. History of stroke-modifying risk factors.   6. History of reflux-asymptomatic.   7. Glaucoma-on drops  8. Screening mammogram 8/17. Bone density 7/2017. Colonscopy 9/10 - diverticulosis.   9. History of right-sided pneumothorax-chest x-ray 2/12 ok.  10.Mild anemia -stable   11. Memory issues - better on Aricept  12. COPD - asymptomatic  13. Vitamin D deficiency - vitamin D level  14. History of diabetes - a1c   15. Vitamin  B12 deficiency - injection today      Prevnar 7/16    I will see her back in 4 months , sooner if problems arise.

## 2018-11-08 ENCOUNTER — OUTPATIENT CASE MANAGEMENT (OUTPATIENT)
Dept: ADMINISTRATIVE | Facility: OTHER | Age: 83
End: 2018-11-08

## 2018-11-08 NOTE — PROGRESS NOTES
Summary:  1st missed follow-up attempts    Interventions:  Left messages on daughter's cell and on pt home home    Plan:  Follow-up next week if no return call

## 2018-11-09 ENCOUNTER — OUTPATIENT CASE MANAGEMENT (OUTPATIENT)
Dept: ADMINISTRATIVE | Facility: OTHER | Age: 83
End: 2018-11-09

## 2018-11-09 NOTE — PROGRESS NOTES
Summary:  3rd Attempt to complete SW Assessment for Outpatient Care Management;  left message requesting a return call.  LCSW mailed letter after 2nd attempt to complete SW Assessment with contact information requesting a return call and pt has not reached out to this LCSW.  LCSW will close case and notified OPCM RN.    Interventions:  Collaborated with OPCM-RN on case closure  Case closure    Plan:  None

## 2018-11-16 ENCOUNTER — OUTPATIENT CASE MANAGEMENT (OUTPATIENT)
Dept: ADMINISTRATIVE | Facility: OTHER | Age: 83
End: 2018-11-16

## 2018-11-16 NOTE — PROGRESS NOTES
Summary  Spoke with pt regarding follow-up.  Pt did go see PCP 2 weeks ago as requested.  Pt B/P was good at that time 110/80.  Pt sounded in good spirits this morning and stated that she is feeling fine - reports that she continues to follow a low salt diet and she is eating. Pt also stated that her daughter who is supposed to be taking pt B/P daily has not yet gotten a B/P cuff.  Pt denies any falls, SOB or swelling of extremities. Pt stated she will have daughter call OPCM as OPCM has left several messages for daughter Paris.     Interventions:  Discussed having daughter call OPCM to discuss possibility of B/P cuff  Praised pt for being compliant with diet      Plan:  Follow-up with daughter  Follow-up on cane order  Review nutrition    Todays OPCM Self-Management Care Plan was developed with the patients/caregivers input and was based on identified barriers from todays assessment.  Goals were written today with the patient/caregiver and the patient has agreed to work towards these goals to improve his/her overall well-being. Patient verbalized understanding of the care plan, goals, and all of today's instructions. Encouraged patient/caregiver to communicate with his/her physician and health care team about health conditions and the treatment plan.  Provided my contact information today and encouraged patient/caregiver to call me with any questions as needed.

## 2018-11-23 ENCOUNTER — OUTPATIENT CASE MANAGEMENT (OUTPATIENT)
Dept: ADMINISTRATIVE | Facility: OTHER | Age: 83
End: 2018-11-23

## 2018-11-23 NOTE — PROGRESS NOTES
Summary:  Spoke with pt regarding follow-up - pt states that pt has stared using a cane last week that PCP ordered.  Pt reports that appetite is getting better - says she is going to fix coffee, scrambled eggs and toast.  Pt reports that her daughter hasn't taken B/P this week but took it last week - pt cannot remember what it was - pt denies dizziness, headache, any falls or SOB    Interventions:  Attempted to reach daughter Paris again - left message  Once again requested that pt have daughter call OPCM    Plan:  Change to episodic  Attempt to reach daughter    Todays OPCM Self-Management Care Plan was developed with the patients/caregivers input and was based on identified barriers from todays assessment.  Goals were written today with the patient/caregiver and the patient has agreed to work towards these goals to improve his/her overall well-being. Patient verbalized understanding of the care plan, goals, and all of today's instructions. Encouraged patient/caregiver to communicate with his/her physician and health care team about health conditions and the treatment plan.  Provided my contact information today and encouraged patient/caregiver to call me with any questions as needed.

## 2018-12-07 ENCOUNTER — OUTPATIENT CASE MANAGEMENT (OUTPATIENT)
Dept: ADMINISTRATIVE | Facility: OTHER | Age: 83
End: 2018-12-07

## 2018-12-07 NOTE — PROGRESS NOTES
Summary:  Spoke with pt. Regarding follow-up - Pt states her B/P is OK - pt cannot remember what her last B/P was reporting that her daughter last took her B/P she thinks on Monday.  Pt states she wrote it down but the paper got moved.  OPCM RN has left several messages for daughter Paris -who has not returned call.  Pt stated that she was alone today - that she had just finished breakfast of coffee, toast and de santiago - stating that she fixed it.  Pt stated that Paris was out of town at her mother-in-laws  and pt stated she does not know when he will be back.  OPCM RN informed pt that several messages were left for Paris to call OPCM RN - however no returned calls- explained it would be very helpful for OPCM to speak with daughter.  Pt stated she would let her daughter know.  Pt reports that sometimes she can't get enough to eat -stating her appetite is better - states she doesn't have any trouble fixing her food - pt denies any falls or SOB    Interventions:  Instructed pt to have daughter call OPCM RN  Encouraged pt to eat when hungry because at the beginning of enrollment there was concern as pt had decreased appetite    Plan  Unable to assess home situation - pt always states she is fine and very thing is fine - but cannot remember health questions and keeps referring OPCM RN to daughter Paris who not return calls (PCP also has issues with returned calls) if unable to speak with daughter Paris will D/C back to [facility]    Todays OPC Self-Management Care Plan was developed with the patients/caregivers input and was based on identified barriers from todays assessment.  Goals were written today with the patient/caregiver and the patient has agreed to work towards these goals to improve his/her overall well-being. Patient verbalized understanding of the care plan, goals, and all of today's instructions. Encouraged patient/caregiver to communicate with his/her physician and health care team about  health conditions and the treatment plan.  Provided my contact information today and encouraged patient/caregiver to call me with any questions as needed.

## 2018-12-18 ENCOUNTER — OUTPATIENT CASE MANAGEMENT (OUTPATIENT)
Dept: ADMINISTRATIVE | Facility: OTHER | Age: 83
End: 2018-12-18

## 2018-12-18 NOTE — PROGRESS NOTES
Summary:  Pt states that she is doing fine - pt states that daughter has not been over to take B/P since coming back from  Mother-in laws .  Pt stated she had grits, de santiago and coffee for breakfast - pt stated that she is alone right .  Pt denies any falls or weakness or SOB    Interventions:  Informed pt that OPCM RN had not been able to speak to daughter Paris - calls never returned  Inform pt that OPCM would close case and return to [facility]  Sent community message to  [facility]    Plan:  D/C case closed    Todays OPCM Self-Management Care Plan was developed with the patients/caregivers input and was based on identified barriers from todays assessment.  Goals were written today with the patient/caregiver and the patient has agreed to work towards these goals to improve his/her overall well-being. Patient verbalized understanding of the care plan, goals, and all of today's instructions. Encouraged patient/caregiver to communicate with his/her physician and health care team about health conditions and the treatment plan.  Provided my contact information today and encouraged patient/caregiver to call me with any questions as needed.

## 2018-12-31 ENCOUNTER — EXTERNAL CHRONIC CARE MANAGEMENT (OUTPATIENT)
Dept: PRIMARY CARE CLINIC | Facility: CLINIC | Age: 83
End: 2018-12-31
Payer: MEDICARE

## 2018-12-31 PROCEDURE — 99490 CHRNC CARE MGMT STAFF 1ST 20: CPT | Mod: S$PBB,,, | Performed by: INTERNAL MEDICINE

## 2018-12-31 PROCEDURE — 99490 PR CHRONIC CARE MGMT, 1ST 20 MIN: ICD-10-PCS | Mod: S$PBB,,, | Performed by: INTERNAL MEDICINE

## 2018-12-31 PROCEDURE — 99490 CHRNC CARE MGMT STAFF 1ST 20: CPT | Mod: PBBFAC | Performed by: INTERNAL MEDICINE

## 2019-01-31 ENCOUNTER — EXTERNAL CHRONIC CARE MANAGEMENT (OUTPATIENT)
Dept: PRIMARY CARE CLINIC | Facility: CLINIC | Age: 84
End: 2019-01-31
Payer: MEDICARE

## 2019-01-31 PROCEDURE — 99490 CHRNC CARE MGMT STAFF 1ST 20: CPT | Mod: S$PBB,,, | Performed by: INTERNAL MEDICINE

## 2019-01-31 PROCEDURE — 99490 PR CHRONIC CARE MGMT, 1ST 20 MIN: ICD-10-PCS | Mod: S$PBB,,, | Performed by: INTERNAL MEDICINE

## 2019-01-31 PROCEDURE — 99490 CHRNC CARE MGMT STAFF 1ST 20: CPT | Mod: PBBFAC | Performed by: INTERNAL MEDICINE

## 2019-03-20 RX ORDER — LEVOTHYROXINE SODIUM 112 UG/1
TABLET ORAL
Qty: 90 TABLET | Refills: 1 | Status: SHIPPED | OUTPATIENT
Start: 2019-03-20 | End: 2019-11-12 | Stop reason: SDUPTHER

## 2019-03-31 ENCOUNTER — EXTERNAL CHRONIC CARE MANAGEMENT (OUTPATIENT)
Dept: PRIMARY CARE CLINIC | Facility: CLINIC | Age: 84
End: 2019-03-31
Payer: MEDICARE

## 2019-03-31 PROCEDURE — 99490 CHRNC CARE MGMT STAFF 1ST 20: CPT | Mod: S$PBB,,, | Performed by: INTERNAL MEDICINE

## 2019-03-31 PROCEDURE — 99490 CHRNC CARE MGMT STAFF 1ST 20: CPT | Mod: PBBFAC | Performed by: INTERNAL MEDICINE

## 2019-03-31 PROCEDURE — 99490 PR CHRONIC CARE MGMT, 1ST 20 MIN: ICD-10-PCS | Mod: S$PBB,,, | Performed by: INTERNAL MEDICINE

## 2019-04-30 ENCOUNTER — EXTERNAL CHRONIC CARE MANAGEMENT (OUTPATIENT)
Dept: PRIMARY CARE CLINIC | Facility: CLINIC | Age: 84
End: 2019-04-30
Payer: MEDICARE

## 2019-04-30 PROCEDURE — 99490 CHRNC CARE MGMT STAFF 1ST 20: CPT | Mod: S$PBB,,, | Performed by: INTERNAL MEDICINE

## 2019-04-30 PROCEDURE — 99490 PR CHRONIC CARE MGMT, 1ST 20 MIN: ICD-10-PCS | Mod: S$PBB,,, | Performed by: INTERNAL MEDICINE

## 2019-04-30 PROCEDURE — 99490 CHRNC CARE MGMT STAFF 1ST 20: CPT | Mod: PBBFAC | Performed by: INTERNAL MEDICINE

## 2019-05-31 ENCOUNTER — EXTERNAL CHRONIC CARE MANAGEMENT (OUTPATIENT)
Dept: PRIMARY CARE CLINIC | Facility: CLINIC | Age: 84
End: 2019-05-31
Payer: MEDICARE

## 2019-05-31 PROCEDURE — 99490 PR CHRONIC CARE MGMT, 1ST 20 MIN: ICD-10-PCS | Mod: S$PBB,,, | Performed by: INTERNAL MEDICINE

## 2019-05-31 PROCEDURE — 99490 CHRNC CARE MGMT STAFF 1ST 20: CPT | Mod: PBBFAC | Performed by: INTERNAL MEDICINE

## 2019-05-31 PROCEDURE — 99490 CHRNC CARE MGMT STAFF 1ST 20: CPT | Mod: S$PBB,,, | Performed by: INTERNAL MEDICINE

## 2019-06-30 ENCOUNTER — EXTERNAL CHRONIC CARE MANAGEMENT (OUTPATIENT)
Dept: PRIMARY CARE CLINIC | Facility: CLINIC | Age: 84
End: 2019-06-30
Payer: MEDICARE

## 2019-06-30 PROCEDURE — 99490 CHRNC CARE MGMT STAFF 1ST 20: CPT | Mod: PBBFAC | Performed by: INTERNAL MEDICINE

## 2019-06-30 PROCEDURE — 99490 CHRNC CARE MGMT STAFF 1ST 20: CPT | Mod: S$PBB,,, | Performed by: INTERNAL MEDICINE

## 2019-06-30 PROCEDURE — 99490 PR CHRONIC CARE MGMT, 1ST 20 MIN: ICD-10-PCS | Mod: S$PBB,,, | Performed by: INTERNAL MEDICINE

## 2019-09-30 ENCOUNTER — EXTERNAL CHRONIC CARE MANAGEMENT (OUTPATIENT)
Dept: PRIMARY CARE CLINIC | Facility: CLINIC | Age: 84
End: 2019-09-30
Payer: MEDICARE

## 2019-09-30 PROCEDURE — 99490 CHRNC CARE MGMT STAFF 1ST 20: CPT | Mod: PBBFAC | Performed by: INTERNAL MEDICINE

## 2019-09-30 PROCEDURE — 99490 PR CHRONIC CARE MGMT, 1ST 20 MIN: ICD-10-PCS | Mod: S$PBB,,, | Performed by: INTERNAL MEDICINE

## 2019-09-30 PROCEDURE — 99490 CHRNC CARE MGMT STAFF 1ST 20: CPT | Mod: S$PBB,,, | Performed by: INTERNAL MEDICINE

## 2019-10-09 ENCOUNTER — PES CALL (OUTPATIENT)
Dept: ADMINISTRATIVE | Facility: CLINIC | Age: 84
End: 2019-10-09

## 2019-10-31 ENCOUNTER — EXTERNAL CHRONIC CARE MANAGEMENT (OUTPATIENT)
Dept: PRIMARY CARE CLINIC | Facility: CLINIC | Age: 84
End: 2019-10-31
Payer: MEDICARE

## 2019-10-31 PROCEDURE — 99490 CHRNC CARE MGMT STAFF 1ST 20: CPT | Mod: PBBFAC | Performed by: INTERNAL MEDICINE

## 2019-10-31 PROCEDURE — 99490 CHRNC CARE MGMT STAFF 1ST 20: CPT | Mod: S$PBB,,, | Performed by: INTERNAL MEDICINE

## 2019-10-31 PROCEDURE — 99490 PR CHRONIC CARE MGMT, 1ST 20 MIN: ICD-10-PCS | Mod: S$PBB,,, | Performed by: INTERNAL MEDICINE

## 2019-11-12 ENCOUNTER — IMMUNIZATION (OUTPATIENT)
Dept: INTERNAL MEDICINE | Facility: CLINIC | Age: 84
End: 2019-11-12
Payer: MEDICARE

## 2019-11-12 ENCOUNTER — OFFICE VISIT (OUTPATIENT)
Dept: INTERNAL MEDICINE | Facility: CLINIC | Age: 84
End: 2019-11-12
Payer: MEDICARE

## 2019-11-12 ENCOUNTER — LAB VISIT (OUTPATIENT)
Dept: LAB | Facility: HOSPITAL | Age: 84
End: 2019-11-12
Attending: INTERNAL MEDICINE
Payer: MEDICARE

## 2019-11-12 VITALS
TEMPERATURE: 98 F | WEIGHT: 151.69 LBS | HEART RATE: 77 BPM | HEIGHT: 64 IN | OXYGEN SATURATION: 95 % | SYSTOLIC BLOOD PRESSURE: 132 MMHG | BODY MASS INDEX: 25.9 KG/M2 | DIASTOLIC BLOOD PRESSURE: 86 MMHG

## 2019-11-12 DIAGNOSIS — E53.8 VITAMIN B12 DEFICIENCY: ICD-10-CM

## 2019-11-12 DIAGNOSIS — I10 ESSENTIAL HYPERTENSION: Primary | ICD-10-CM

## 2019-11-12 DIAGNOSIS — M85.80 OSTEOPENIA, UNSPECIFIED LOCATION: ICD-10-CM

## 2019-11-12 DIAGNOSIS — E55.9 VITAMIN D DEFICIENCY DISEASE: ICD-10-CM

## 2019-11-12 DIAGNOSIS — Z12.31 SCREENING MAMMOGRAM, ENCOUNTER FOR: ICD-10-CM

## 2019-11-12 DIAGNOSIS — E13.9 DIABETES MELLITUS DUE TO ABNORMAL INSULIN: ICD-10-CM

## 2019-11-12 DIAGNOSIS — E03.9 HYPOTHYROIDISM, UNSPECIFIED TYPE: ICD-10-CM

## 2019-11-12 DIAGNOSIS — E78.5 HYPERLIPIDEMIA, UNSPECIFIED HYPERLIPIDEMIA TYPE: ICD-10-CM

## 2019-11-12 DIAGNOSIS — I10 ESSENTIAL HYPERTENSION: ICD-10-CM

## 2019-11-12 DIAGNOSIS — J44.9 CHRONIC OBSTRUCTIVE PULMONARY DISEASE, UNSPECIFIED COPD TYPE: ICD-10-CM

## 2019-11-12 LAB
25(OH)D3+25(OH)D2 SERPL-MCNC: 35 NG/ML (ref 30–96)
ALBUMIN SERPL BCP-MCNC: 3.6 G/DL (ref 3.5–5.2)
ALP SERPL-CCNC: 73 U/L (ref 55–135)
ALT SERPL W/O P-5'-P-CCNC: 7 U/L (ref 10–44)
ANION GAP SERPL CALC-SCNC: 10 MMOL/L (ref 8–16)
AST SERPL-CCNC: 17 U/L (ref 10–40)
BASOPHILS # BLD AUTO: 0.03 K/UL (ref 0–0.2)
BASOPHILS NFR BLD: 0.7 % (ref 0–1.9)
BILIRUB SERPL-MCNC: 1.5 MG/DL (ref 0.1–1)
BUN SERPL-MCNC: 13 MG/DL (ref 8–23)
CALCIUM SERPL-MCNC: 9.7 MG/DL (ref 8.7–10.5)
CHLORIDE SERPL-SCNC: 104 MMOL/L (ref 95–110)
CHOLEST SERPL-MCNC: 268 MG/DL (ref 120–199)
CHOLEST/HDLC SERPL: 3.5 {RATIO} (ref 2–5)
CO2 SERPL-SCNC: 31 MMOL/L (ref 23–29)
CREAT SERPL-MCNC: 1 MG/DL (ref 0.5–1.4)
DIFFERENTIAL METHOD: NORMAL
EOSINOPHIL # BLD AUTO: 0.1 K/UL (ref 0–0.5)
EOSINOPHIL NFR BLD: 3.2 % (ref 0–8)
ERYTHROCYTE [DISTWIDTH] IN BLOOD BY AUTOMATED COUNT: 13.9 % (ref 11.5–14.5)
EST. GFR  (AFRICAN AMERICAN): >60 ML/MIN/1.73 M^2
EST. GFR  (NON AFRICAN AMERICAN): 52.6 ML/MIN/1.73 M^2
ESTIMATED AVG GLUCOSE: 100 MG/DL (ref 68–131)
GLUCOSE SERPL-MCNC: 103 MG/DL (ref 70–110)
HBA1C MFR BLD HPLC: 5.1 % (ref 4–5.6)
HCT VFR BLD AUTO: 40.3 % (ref 37–48.5)
HDLC SERPL-MCNC: 77 MG/DL (ref 40–75)
HDLC SERPL: 28.7 % (ref 20–50)
HGB BLD-MCNC: 13 G/DL (ref 12–16)
LDLC SERPL CALC-MCNC: 174 MG/DL (ref 63–159)
LYMPHOCYTES # BLD AUTO: 1.7 K/UL (ref 1–4.8)
LYMPHOCYTES NFR BLD: 41.4 % (ref 18–48)
MCH RBC QN AUTO: 28.6 PG (ref 27–31)
MCHC RBC AUTO-ENTMCNC: 32.3 G/DL (ref 32–36)
MCV RBC AUTO: 89 FL (ref 82–98)
MONOCYTES # BLD AUTO: 0.3 K/UL (ref 0.3–1)
MONOCYTES NFR BLD: 8.2 % (ref 4–15)
NEUTROPHILS # BLD AUTO: 1.9 K/UL (ref 1.8–7.7)
NEUTROPHILS NFR BLD: 46.5 % (ref 38–73)
NONHDLC SERPL-MCNC: 191 MG/DL
PLATELET # BLD AUTO: 221 K/UL (ref 150–350)
PMV BLD AUTO: 10.4 FL (ref 9.2–12.9)
POTASSIUM SERPL-SCNC: 3.8 MMOL/L (ref 3.5–5.1)
PROT SERPL-MCNC: 7.5 G/DL (ref 6–8.4)
RBC # BLD AUTO: 4.54 M/UL (ref 4–5.4)
SODIUM SERPL-SCNC: 145 MMOL/L (ref 136–145)
T4 FREE SERPL-MCNC: 0.85 NG/DL (ref 0.71–1.51)
TRIGL SERPL-MCNC: 85 MG/DL (ref 30–150)
TSH SERPL DL<=0.005 MIU/L-ACNC: 61.37 UIU/ML (ref 0.4–4)
VIT B12 SERPL-MCNC: 392 PG/ML (ref 210–950)
WBC # BLD AUTO: 4.03 K/UL (ref 3.9–12.7)

## 2019-11-12 PROCEDURE — 84443 ASSAY THYROID STIM HORMONE: CPT

## 2019-11-12 PROCEDURE — 99214 OFFICE O/P EST MOD 30 MIN: CPT | Mod: PBBFAC | Performed by: INTERNAL MEDICINE

## 2019-11-12 PROCEDURE — 36415 COLL VENOUS BLD VENIPUNCTURE: CPT

## 2019-11-12 PROCEDURE — 82306 VITAMIN D 25 HYDROXY: CPT

## 2019-11-12 PROCEDURE — 99999 PR PBB SHADOW E&M-EST. PATIENT-LVL IV: CPT | Mod: PBBFAC,,, | Performed by: INTERNAL MEDICINE

## 2019-11-12 PROCEDURE — 90662 IIV NO PRSV INCREASED AG IM: CPT | Mod: PBBFAC

## 2019-11-12 PROCEDURE — 85025 COMPLETE CBC W/AUTO DIFF WBC: CPT

## 2019-11-12 PROCEDURE — 99999 PR PBB SHADOW E&M-EST. PATIENT-LVL IV: ICD-10-PCS | Mod: PBBFAC,,, | Performed by: INTERNAL MEDICINE

## 2019-11-12 PROCEDURE — 82607 VITAMIN B-12: CPT

## 2019-11-12 PROCEDURE — 80061 LIPID PANEL: CPT

## 2019-11-12 PROCEDURE — 80053 COMPREHEN METABOLIC PANEL: CPT

## 2019-11-12 PROCEDURE — 99214 PR OFFICE/OUTPT VISIT, EST, LEVL IV, 30-39 MIN: ICD-10-PCS | Mod: S$PBB,,, | Performed by: INTERNAL MEDICINE

## 2019-11-12 PROCEDURE — 83036 HEMOGLOBIN GLYCOSYLATED A1C: CPT

## 2019-11-12 PROCEDURE — 99214 OFFICE O/P EST MOD 30 MIN: CPT | Mod: S$PBB,,, | Performed by: INTERNAL MEDICINE

## 2019-11-12 PROCEDURE — 84439 ASSAY OF FREE THYROXINE: CPT

## 2019-11-12 RX ORDER — PRAVASTATIN SODIUM 20 MG/1
20 TABLET ORAL DAILY
Qty: 90 TABLET | Refills: 4 | Status: SHIPPED | OUTPATIENT
Start: 2019-11-12 | End: 2021-01-15

## 2019-11-12 RX ORDER — DONEPEZIL HYDROCHLORIDE 10 MG/1
10 TABLET, FILM COATED ORAL DAILY
Qty: 90 TABLET | Refills: 4 | Status: SHIPPED | OUTPATIENT
Start: 2019-11-12 | End: 2021-02-22

## 2019-11-12 RX ORDER — LISINOPRIL AND HYDROCHLOROTHIAZIDE 20; 25 MG/1; MG/1
1 TABLET ORAL DAILY
Qty: 90 TABLET | Refills: 4 | Status: SHIPPED | OUTPATIENT
Start: 2019-11-12 | End: 2020-11-27

## 2019-11-12 RX ORDER — LEVOTHYROXINE SODIUM 112 UG/1
112 TABLET ORAL DAILY
Qty: 90 TABLET | Refills: 4 | Status: SHIPPED | OUTPATIENT
Start: 2019-11-12 | End: 2020-11-27

## 2019-11-12 NOTE — PROGRESS NOTES
CHIEF COMPLAINT: Follow up of hypertension, hyperlipidemia, osteoporosis, and hypothyroidism.     HISTORY OF PRESENT ILLNESS: This is a 84-year-old woman who presents with her daughter for above.      She denies any pain today.  She has occasional headache and may take tylenol once a month.    SHe brings her pill bottles with her today.      She has been doing well. She is taking lisinopril HCT 20/25 one tablet daily. She denies any chest pain, shortness of breath, dizziness, blurred vision,  joint pain or muscle pain from the pravastatin 20 mg daily. She has been on levothyroxine 112 mcg daily. She denies any fatigue. She denies any fever, chills, nausea, vomiting, constipation, diarrhea. Vision is good. She denies any dysuria, hematuria, anxiety, depression, insomnia. Memory is ok Aricept 10 mg daily. HEr daughter states she has some memory loss which is stable on the aricept. She is also taking Vitamin D 400 units daily.     PAST MEDICAL HISTORY:   1. Hypertension.   2. Hyperlipidemia.   3. Status post stroke November 2001, hospitalized at [facility], had left facial drop and left-sided weakness which have since resolved.   4. COPD.   5. Spontaneous right pneumothorax April 2007, resolved with chest tube.   6. Shingles in 2007.   7. History of elevated blood sugars.   8. Glaucoma, followed by Dr. Rees.   9. Hyperthyroidism, status post radioactive iodine 10/2/2008.   10. Reflux.   11. Vitamin B12 deficiency    PAST SURGICAL HISTORY: Hysterectomy. She had her ovaries removed.      Social history - she has 10 children - 6 girls and 4 boys.     MEDICATIONS AND ALLERGIES: Updated in epic     PHYSICAL EXAMINATION:       GENERAL: Alert, oriented. No apparent distress. Affect within normal   limits.   CONJUNCTIVAE: Anicteric.   TYMPANIC MEMBRANES: Clear.   OROPHARYNX: Clear.   NECK: Supple.   RESPIRATORY: Effort normal.   LUNGS: Clear to auscultation.   HEART: Regular rate and rhythm without murmurs, gallops, or rubs.    No lower extremity edema.    ABDOMEN: soft, non distended, non tender, bowel sounds present, no hepatosplenomgaly  BREAST: no abn seen, no nodules palpated, no axillary lad    Protective Sensation (w/ 10 gram monofilament):  Right: Decreased  Left: Decreased    Visual Inspection:  Dry Skin -  Bilateral    Pedal Pulses:   Right: Present  Left: Present    Posterior tibialis:   Right:Present  Left: Present         ASSESSMENT AND PLAN:   1. Hypertension-stable  2. Hyperlipidemia-on pravastatin  3. Hypothyroidism-on synthroid to 112 mcg daily  4. Osteopenia- done 7/2017  5. History of stroke-modifying risk factors.   6. History of reflux-asymptomatic.   7. Glaucoma-on drops  8. Screening mammogram 10/18 Bone density 7/2017. Colonscopy 9/10 - diverticulosis.   9. History of right-sided pneumothorax-chest x-ray 2/12 ok.  10.Mild anemia -stable   11. Memory issues - better on Aricept  12. COPD - asymptomatic  13. Vitamin D deficiency - vitamin D   14. History of diabetes - a1c   15. Vitamin  B12 deficiency - check level      Prevnar 7/16    I will see her back in 4 months , sooner if problems arise.

## 2019-11-18 ENCOUNTER — TELEPHONE (OUTPATIENT)
Dept: INTERNAL MEDICINE | Facility: CLINIC | Age: 84
End: 2019-11-18

## 2019-11-18 NOTE — TELEPHONE ENCOUNTER
----- Message from Lien Griffith MD sent at 11/17/2019  2:07 PM CST -----  PLease notify daughter  Your blood count, blood sugar, kidney function, liver function are fine  Your mother's cholesterol is elevated - make sure she is taking her cholesterol medication - pravastatin daily  Your mother's thyroid funciton is off - make sure she is taking her thyroid medication (levothyroxine) daily

## 2019-11-19 NOTE — TELEPHONE ENCOUNTER
Spoke with pts daughter, notified of message and to make sure she takes all medications daily. verbalized understanding

## 2019-11-21 ENCOUNTER — PATIENT OUTREACH (OUTPATIENT)
Dept: ADMINISTRATIVE | Facility: HOSPITAL | Age: 84
End: 2019-11-21

## 2019-11-30 ENCOUNTER — EXTERNAL CHRONIC CARE MANAGEMENT (OUTPATIENT)
Dept: PRIMARY CARE CLINIC | Facility: CLINIC | Age: 84
End: 2019-11-30

## 2019-12-31 ENCOUNTER — EXTERNAL CHRONIC CARE MANAGEMENT (OUTPATIENT)
Dept: PRIMARY CARE CLINIC | Facility: CLINIC | Age: 84
End: 2019-12-31
Payer: MEDICARE

## 2019-12-31 PROCEDURE — 99490 CHRNC CARE MGMT STAFF 1ST 20: CPT | Mod: PBBFAC | Performed by: INTERNAL MEDICINE

## 2019-12-31 PROCEDURE — 99490 PR CHRONIC CARE MGMT, 1ST 20 MIN: ICD-10-PCS | Mod: S$PBB,,, | Performed by: INTERNAL MEDICINE

## 2019-12-31 PROCEDURE — 99490 CHRNC CARE MGMT STAFF 1ST 20: CPT | Mod: S$PBB,,, | Performed by: INTERNAL MEDICINE

## 2020-01-31 ENCOUNTER — EXTERNAL CHRONIC CARE MANAGEMENT (OUTPATIENT)
Dept: PRIMARY CARE CLINIC | Facility: CLINIC | Age: 85
End: 2020-01-31
Payer: MEDICARE

## 2020-01-31 PROCEDURE — 99490 PR CHRONIC CARE MGMT, 1ST 20 MIN: ICD-10-PCS | Mod: S$PBB,,, | Performed by: INTERNAL MEDICINE

## 2020-01-31 PROCEDURE — 99490 CHRNC CARE MGMT STAFF 1ST 20: CPT | Mod: S$PBB,,, | Performed by: INTERNAL MEDICINE

## 2020-01-31 PROCEDURE — 99490 CHRNC CARE MGMT STAFF 1ST 20: CPT | Mod: PBBFAC | Performed by: INTERNAL MEDICINE

## 2020-02-29 ENCOUNTER — EXTERNAL CHRONIC CARE MANAGEMENT (OUTPATIENT)
Dept: PRIMARY CARE CLINIC | Facility: CLINIC | Age: 85
End: 2020-02-29
Payer: MEDICARE

## 2020-02-29 PROCEDURE — 99490 CHRNC CARE MGMT STAFF 1ST 20: CPT | Mod: S$PBB,,, | Performed by: INTERNAL MEDICINE

## 2020-02-29 PROCEDURE — 99490 PR CHRONIC CARE MGMT, 1ST 20 MIN: ICD-10-PCS | Mod: S$PBB,,, | Performed by: INTERNAL MEDICINE

## 2020-02-29 PROCEDURE — 99490 CHRNC CARE MGMT STAFF 1ST 20: CPT | Mod: PBBFAC | Performed by: INTERNAL MEDICINE

## 2020-03-30 ENCOUNTER — TELEPHONE (OUTPATIENT)
Dept: INTERNAL MEDICINE | Facility: CLINIC | Age: 85
End: 2020-03-30

## 2020-03-30 ENCOUNTER — OFFICE VISIT (OUTPATIENT)
Dept: INTERNAL MEDICINE | Facility: CLINIC | Age: 85
End: 2020-03-30
Payer: MEDICARE

## 2020-03-30 ENCOUNTER — PATIENT MESSAGE (OUTPATIENT)
Dept: INTERNAL MEDICINE | Facility: CLINIC | Age: 85
End: 2020-03-30

## 2020-03-30 VITALS — HEART RATE: 55 BPM | DIASTOLIC BLOOD PRESSURE: 109 MMHG | SYSTOLIC BLOOD PRESSURE: 180 MMHG
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
## 2020-03-30 DIAGNOSIS — R26.9 GAIT ABNORMALITY: ICD-10-CM

## 2020-03-30 DIAGNOSIS — E03.9 HYPOTHYROIDISM, UNSPECIFIED TYPE: ICD-10-CM

## 2020-03-30 DIAGNOSIS — I10 ESSENTIAL HYPERTENSION: Primary | ICD-10-CM

## 2020-03-30 DIAGNOSIS — E78.5 HYPERLIPIDEMIA, UNSPECIFIED HYPERLIPIDEMIA TYPE: ICD-10-CM

## 2020-03-30 DIAGNOSIS — J44.9 CHRONIC OBSTRUCTIVE PULMONARY DISEASE, UNSPECIFIED COPD TYPE: ICD-10-CM

## 2020-03-30 PROCEDURE — 99214 OFFICE O/P EST MOD 30 MIN: CPT | Mod: 95,,, | Performed by: INTERNAL MEDICINE

## 2020-03-30 PROCEDURE — 99214 PR OFFICE/OUTPT VISIT, EST, LEVL IV, 30-39 MIN: ICD-10-PCS | Mod: 95,,, | Performed by: INTERNAL MEDICINE

## 2020-03-30 RX ORDER — AMLODIPINE BESYLATE 2.5 MG/1
2.5 TABLET ORAL DAILY
Qty: 30 TABLET | Refills: 3 | Status: SHIPPED | OUTPATIENT
Start: 2020-03-30 | End: 2020-07-16

## 2020-03-30 RX ORDER — SULFAMETHOXAZOLE AND TRIMETHOPRIM 800; 160 MG/1; MG/1
1 TABLET ORAL 2 TIMES DAILY
Qty: 14 TABLET | Refills: 0 | Status: SHIPPED | OUTPATIENT
Start: 2020-03-30 | End: 2020-04-06

## 2020-03-30 RX ORDER — NAPROXEN SODIUM 220 MG/1
81 TABLET, FILM COATED ORAL DAILY
Refills: 0 | COMMUNITY
Start: 2020-03-30 | End: 2021-11-26 | Stop reason: SDUPTHER

## 2020-03-30 NOTE — PROGRESS NOTES
CHIEF COMPLAINT: Weakness    HISTORY OF PRESENT ILLNESS: This is a 84-year-old woman who presents via a tele visit with her daughter due to weakness    She was walking to the bathroom 2 days ago and fell. She got weak and dizzy. She hit her head - has a small bruise on the area.  NO headache.  She had slurred speech that lasted about an hour after the fall.  She is currently not taking aspirin. MOving arms and legs well. No swallowing difficulty.     No  Nausea, vomiting, constipation, diarrhea.  Appetite has been down for some time. She may not be drinking a lot of fluids.  NO fever, chills, cough, body aches, sore throat.      She denies any pain today.        She is taking lisinopril HCT 20/25 one tablet daily,  pravastatin 20 mg daily, and levothyroxine 112 mcg daily,  Aricept 10 mg daily.     BP at home during visit 180/109 pulse 55    PAST MEDICAL HISTORY:   1. Hypertension.   2. Hyperlipidemia.   3. Status post stroke November 2001, hospitalized at [facility], had left facial drop and left-sided weakness which have since resolved.   4. COPD.   5. Spontaneous right pneumothorax April 2007, resolved with chest tube.   6. Shingles in 2007.   7. History of elevated blood sugars.   8. Glaucoma, followed by Dr. Rees.   9. Hyperthyroidism, status post radioactive iodine 10/2/2008.   10. Reflux.   11. Vitamin B12 deficiency    PAST SURGICAL HISTORY: Hysterectomy. She had her ovaries removed.      Social history - she has 10 children - 6 girls and 4 boys.     MEDICATIONS AND ALLERGIES: Updated in epic     PHYSICAL EXAMINATION:   Alert and oriented,  No slurred speech.  Walked with a cane with assistance by family member.  She stated she was a little weak while ambulating.                ASSESSMENT AND PLAN:   1. Fall and episode of slurred speech - could have been a TIA.  Start on aspirin 81 mg daily.  Do not feel that she has any specific  Neurological deficits at this time to warrant CT scan head.   2. Hypertension-BP  is elevated today- if just had a neurological event, do not want to bring down the BP too much. Add amlodipine 2.5 mg daily  3. Weakness and poor appetite- could be dehydrated - push fluids. Also at risk for a UTI. Will empirically treat with bactrim DS twice daily for 7 days  4. Hyperlipidemia-on pravastatin  5. Hypothyroidism-on synthroid to 112 mcg daily  6. Osteopenia- done 7/2017  7. Memory issues -  on Aricept  8. COPD - asymptomatic    Will start home health due to these issues. Daughter is to continue to monitor BP and let me know readings. She is to let me know if she does not improve or worsen    The patient location is: [place], [place]  The chief complaint leading to consultation is:COVID panemic  Visit type: Virtual visit with synchronous audio and video    Each patient to whom he or she provides medical services by telemedicine is:  (1) informed of the relationship between the physician and patient and the respective role of any other health care provider with respect to management of the patient; and (2) notified that he or she may decline to receive medical services by telemedicine and may withdraw from such care at any time.        I will see her back in 4 months , sooner if problems arise.

## 2020-03-30 NOTE — TELEPHONE ENCOUNTER
Pt fell yesterday, her temp was fine however her bp was elevated 168/80. Pt was dizzy. Pt got up to use the restroom and lost her balance. Pt is feels fine today according to pt daughter. Pt daughter states that she is scared because the pt had a previous stroke. Please advise.

## 2020-03-30 NOTE — TELEPHONE ENCOUNTER
----- Message from Pennie Hall sent at 3/30/2020  8:08 AM CDT -----  Needs Advice    Reason for call:--fell the other and confused, no fever and pressure high--        Communication Preference:--Paris--daughter [phone]--    Additional Information:Paris calling to speak with a nurse to see if she can do a video visit with the symptoms listed above for the pt. Please call to advise.

## 2020-03-30 NOTE — TELEPHONE ENCOUNTER
Can do a video visit.  Patient will have to be set up for [facility].  Daughter will have to get a [facility] account for her mother first.

## 2020-03-30 NOTE — TELEPHONE ENCOUNTER
----- Message from Mary Villalta sent at 3/30/2020 11:53 AM CDT -----  Contact: Paris(daughter)[phone]  Pt daughter states she is trying to schedule a virtual video visit with Dr Griffith for today. Pt daughter was advised the first available appt is 3/31.  Please call and advise.

## 2020-03-31 ENCOUNTER — EXTERNAL CHRONIC CARE MANAGEMENT (OUTPATIENT)
Dept: PRIMARY CARE CLINIC | Facility: CLINIC | Age: 85
End: 2020-03-31
Payer: MEDICARE

## 2020-03-31 PROCEDURE — 99490 PR CHRONIC CARE MGMT, 1ST 20 MIN: ICD-10-PCS | Mod: S$PBB,,, | Performed by: INTERNAL MEDICINE

## 2020-03-31 PROCEDURE — G0180 MD CERTIFICATION HHA PATIENT: HCPCS | Mod: ,,, | Performed by: INTERNAL MEDICINE

## 2020-03-31 PROCEDURE — 99490 CHRNC CARE MGMT STAFF 1ST 20: CPT | Mod: PBBFAC | Performed by: INTERNAL MEDICINE

## 2020-03-31 PROCEDURE — G0180 PR HOME HEALTH MD CERTIFICATION: ICD-10-PCS | Mod: ,,, | Performed by: INTERNAL MEDICINE

## 2020-03-31 PROCEDURE — 99490 CHRNC CARE MGMT STAFF 1ST 20: CPT | Mod: S$PBB,,, | Performed by: INTERNAL MEDICINE

## 2020-04-03 ENCOUNTER — TELEPHONE (OUTPATIENT)
Dept: INTERNAL MEDICINE | Facility: CLINIC | Age: 85
End: 2020-04-03

## 2020-04-03 DIAGNOSIS — R26.9 GAIT ABNORMALITY: Primary | ICD-10-CM

## 2020-04-16 ENCOUNTER — EXTERNAL HOME HEALTH (OUTPATIENT)
Dept: HOME HEALTH SERVICES | Facility: HOSPITAL | Age: 85
End: 2020-04-16
Payer: MEDICARE

## 2020-04-17 ENCOUNTER — DOCUMENT SCAN (OUTPATIENT)
Dept: HOME HEALTH SERVICES | Facility: HOSPITAL | Age: 85
End: 2020-04-17
Payer: MEDICARE

## 2020-04-27 DIAGNOSIS — H40.1133 PRIMARY OPEN ANGLE GLAUCOMA OF BOTH EYES, SEVERE STAGE: ICD-10-CM

## 2020-04-27 RX ORDER — BRIMONIDINE TARTRATE, TIMOLOL MALEATE 2; 5 MG/ML; MG/ML
SOLUTION/ DROPS OPHTHALMIC
Qty: 10 ML | Refills: 0 | Status: SHIPPED | OUTPATIENT
Start: 2020-04-27 | End: 2020-09-17 | Stop reason: SDUPTHER

## 2020-04-27 RX ORDER — LATANOPROST 50 UG/ML
SOLUTION/ DROPS OPHTHALMIC
Qty: 3 ML | Refills: 0 | Status: SHIPPED | OUTPATIENT
Start: 2020-04-27 | End: 2020-09-17 | Stop reason: SDUPTHER

## 2020-04-27 RX ORDER — BRINZOLAMIDE 10 MG/ML
SUSPENSION/ DROPS OPHTHALMIC
Qty: 10 ML | Refills: 0 | Status: SHIPPED | OUTPATIENT
Start: 2020-04-27 | End: 2020-04-30 | Stop reason: CLARIF

## 2020-04-27 NOTE — TELEPHONE ENCOUNTER
This pt has not been seen in almost 2 yers. She needs to call and make an appointment to be seen. This is why No refills are authorized

## 2020-04-30 ENCOUNTER — EXTERNAL CHRONIC CARE MANAGEMENT (OUTPATIENT)
Dept: PRIMARY CARE CLINIC | Facility: CLINIC | Age: 85
End: 2020-04-30
Payer: MEDICARE

## 2020-04-30 DIAGNOSIS — H40.1133 PRIMARY OPEN ANGLE GLAUCOMA (POAG) OF BOTH EYES, SEVERE STAGE: Primary | ICD-10-CM

## 2020-04-30 PROCEDURE — 99490 PR CHRONIC CARE MGMT, 1ST 20 MIN: ICD-10-PCS | Mod: S$PBB,,, | Performed by: INTERNAL MEDICINE

## 2020-04-30 PROCEDURE — 99490 CHRNC CARE MGMT STAFF 1ST 20: CPT | Mod: PBBFAC | Performed by: INTERNAL MEDICINE

## 2020-04-30 PROCEDURE — 99490 CHRNC CARE MGMT STAFF 1ST 20: CPT | Mod: S$PBB,,, | Performed by: INTERNAL MEDICINE

## 2020-04-30 RX ORDER — DORZOLAMIDE HCL 20 MG/ML
1 SOLUTION/ DROPS OPHTHALMIC 3 TIMES DAILY
Qty: 10 ML | Refills: 6 | Status: SHIPPED | OUTPATIENT
Start: 2020-04-30

## 2020-04-30 RX ORDER — DORZOLAMIDE HCL 20 MG/ML
1 SOLUTION/ DROPS OPHTHALMIC 3 TIMES DAILY
COMMUNITY
End: 2020-04-30 | Stop reason: SDUPTHER

## 2020-04-30 NOTE — TELEPHONE ENCOUNTER
Pt's insurance is no longer covering Azopt. Pt can switch to Dorzolamide. I left a message for pt advising pt about the change and that dorzolamide can cause burning sensation. [facility].

## 2020-05-12 ENCOUNTER — DOCUMENT SCAN (OUTPATIENT)
Dept: HOME HEALTH SERVICES | Facility: HOSPITAL | Age: 85
End: 2020-05-12
Payer: MEDICARE

## 2020-05-31 ENCOUNTER — EXTERNAL CHRONIC CARE MANAGEMENT (OUTPATIENT)
Dept: PRIMARY CARE CLINIC | Facility: CLINIC | Age: 85
End: 2020-05-31
Payer: MEDICARE

## 2020-05-31 PROCEDURE — 99490 CHRNC CARE MGMT STAFF 1ST 20: CPT | Mod: S$PBB,,, | Performed by: INTERNAL MEDICINE

## 2020-05-31 PROCEDURE — 99490 PR CHRONIC CARE MGMT, 1ST 20 MIN: ICD-10-PCS | Mod: S$PBB,,, | Performed by: INTERNAL MEDICINE

## 2020-05-31 PROCEDURE — 99490 CHRNC CARE MGMT STAFF 1ST 20: CPT | Mod: PBBFAC | Performed by: INTERNAL MEDICINE

## 2020-06-30 ENCOUNTER — EXTERNAL CHRONIC CARE MANAGEMENT (OUTPATIENT)
Dept: PRIMARY CARE CLINIC | Facility: CLINIC | Age: 85
End: 2020-06-30
Payer: MEDICARE

## 2020-06-30 PROCEDURE — 99490 PR CHRONIC CARE MGMT, 1ST 20 MIN: ICD-10-PCS | Mod: S$PBB,,, | Performed by: INTERNAL MEDICINE

## 2020-06-30 PROCEDURE — 99490 CHRNC CARE MGMT STAFF 1ST 20: CPT | Mod: PBBFAC | Performed by: INTERNAL MEDICINE

## 2020-06-30 PROCEDURE — 99490 CHRNC CARE MGMT STAFF 1ST 20: CPT | Mod: S$PBB,,, | Performed by: INTERNAL MEDICINE

## 2020-07-16 RX ORDER — AMLODIPINE BESYLATE 2.5 MG/1
TABLET ORAL
Qty: 90 TABLET | Refills: 2 | Status: SHIPPED | OUTPATIENT
Start: 2020-07-16 | End: 2021-06-17

## 2020-07-16 NOTE — TELEPHONE ENCOUNTER
Refill Routing Note   Medication(s) are not appropriate for processing by [facility]:       - Required vitals are abnormal        Medication Therapy Plan: BP at /109 mmHg. Patient initiated on amlodipine 2.5 mg daily. Defer refill to PCP for review.  Medication reconciliation completed: No      Automatic Epic Generated Protocol Data:    Requested Prescriptions   Pending Prescriptions Disp Refills    amLODIPine (NORVASC) 2.5 MG tablet [Pharmacy Med Name: amLODIPine Besylate 2.5 MG Oral Tablet] 90 tablet 2     Sig: Take 1 tablet by mouth once daily       Cardiovascular:  Calcium Channel Blockers Failed - 7/16/2020  4:30 AM        Failed - Last BP in normal range within 360 days.     BP Readings from Last 3 Encounters:   03/30/20 (!) 180/109   11/12/19 132/86   10/31/18 110/80              Passed - Patient is at least 18 years old        Passed - Office visit in past 12 months or future 90 days.     Recent Outpatient Visits            3 months ago Essential hypertension    [facility] - Internal Medicine Lien Griffith MD    8 months ago Essential hypertension    [facility] - Internal Medicine Lien Griffith MD    1 year ago Essential hypertension    [facility] - Internal Medicine Lien Griffith MD    1 year ago Left arm pain    [facility] - Internal Medicine Moises Brown, BETINA    1 year ago Primary open angle glaucoma of both eyes, severe stage    [facility] - Ophthalmology Brenda Ceja MD                          Appointments  past 12m or future 3m with PCP    Date Provider   Last Visit   3/30/2020 Lien Griffith MD   Next Visit   Visit date not found Lien Griffith MD   ED visits in past 90 days: [unfilled]     Note composed:11:39 AM 07/16/2020

## 2020-07-17 DIAGNOSIS — Z71.89 COMPLEX CARE COORDINATION: ICD-10-CM

## 2020-07-31 ENCOUNTER — EXTERNAL CHRONIC CARE MANAGEMENT (OUTPATIENT)
Dept: PRIMARY CARE CLINIC | Facility: CLINIC | Age: 85
End: 2020-07-31
Payer: MEDICARE

## 2020-07-31 PROCEDURE — 99490 CHRNC CARE MGMT STAFF 1ST 20: CPT | Mod: S$PBB,,, | Performed by: INTERNAL MEDICINE

## 2020-07-31 PROCEDURE — 99490 PR CHRONIC CARE MGMT, 1ST 20 MIN: ICD-10-PCS | Mod: S$PBB,,, | Performed by: INTERNAL MEDICINE

## 2020-07-31 PROCEDURE — 99490 CHRNC CARE MGMT STAFF 1ST 20: CPT | Mod: PBBFAC | Performed by: INTERNAL MEDICINE

## 2020-08-31 ENCOUNTER — EXTERNAL CHRONIC CARE MANAGEMENT (OUTPATIENT)
Dept: PRIMARY CARE CLINIC | Facility: CLINIC | Age: 85
End: 2020-08-31
Payer: MEDICARE

## 2020-08-31 PROCEDURE — 99490 CHRNC CARE MGMT STAFF 1ST 20: CPT | Mod: PBBFAC | Performed by: INTERNAL MEDICINE

## 2020-08-31 PROCEDURE — 99490 PR CHRONIC CARE MGMT, 1ST 20 MIN: ICD-10-PCS | Mod: S$PBB,,, | Performed by: INTERNAL MEDICINE

## 2020-08-31 PROCEDURE — 99490 CHRNC CARE MGMT STAFF 1ST 20: CPT | Mod: S$PBB,,, | Performed by: INTERNAL MEDICINE

## 2020-09-17 DIAGNOSIS — H40.1133 PRIMARY OPEN ANGLE GLAUCOMA OF BOTH EYES, SEVERE STAGE: ICD-10-CM

## 2020-09-17 RX ORDER — LATANOPROST 50 UG/ML
1 SOLUTION/ DROPS OPHTHALMIC NIGHTLY
Qty: 5 ML | Refills: 6 | Status: SHIPPED | OUTPATIENT
Start: 2020-09-17

## 2020-09-17 RX ORDER — BRIMONIDINE TARTRATE, TIMOLOL MALEATE 2; 5 MG/ML; MG/ML
1 SOLUTION/ DROPS OPHTHALMIC 2 TIMES DAILY
Qty: 10 ML | Refills: 6 | Status: SHIPPED | OUTPATIENT
Start: 2020-09-17

## 2020-09-29 ENCOUNTER — PATIENT MESSAGE (OUTPATIENT)
Dept: OTHER | Facility: OTHER | Age: 85
End: 2020-09-29

## 2020-09-30 ENCOUNTER — EXTERNAL CHRONIC CARE MANAGEMENT (OUTPATIENT)
Dept: PRIMARY CARE CLINIC | Facility: CLINIC | Age: 85
End: 2020-09-30
Payer: MEDICARE

## 2020-09-30 PROCEDURE — 99490 CHRNC CARE MGMT STAFF 1ST 20: CPT | Mod: S$PBB,,, | Performed by: INTERNAL MEDICINE

## 2020-09-30 PROCEDURE — 99490 CHRNC CARE MGMT STAFF 1ST 20: CPT | Mod: PBBFAC | Performed by: INTERNAL MEDICINE

## 2020-09-30 PROCEDURE — 99490 PR CHRONIC CARE MGMT, 1ST 20 MIN: ICD-10-PCS | Mod: S$PBB,,, | Performed by: INTERNAL MEDICINE

## 2020-10-31 ENCOUNTER — EXTERNAL CHRONIC CARE MANAGEMENT (OUTPATIENT)
Dept: PRIMARY CARE CLINIC | Facility: CLINIC | Age: 85
End: 2020-10-31
Payer: MEDICARE

## 2020-10-31 PROCEDURE — 99490 CHRNC CARE MGMT STAFF 1ST 20: CPT | Mod: S$PBB,,, | Performed by: INTERNAL MEDICINE

## 2020-10-31 PROCEDURE — 99490 CHRNC CARE MGMT STAFF 1ST 20: CPT | Mod: PBBFAC | Performed by: INTERNAL MEDICINE

## 2020-10-31 PROCEDURE — 99490 PR CHRONIC CARE MGMT, 1ST 20 MIN: ICD-10-PCS | Mod: S$PBB,,, | Performed by: INTERNAL MEDICINE

## 2020-11-30 ENCOUNTER — EXTERNAL CHRONIC CARE MANAGEMENT (OUTPATIENT)
Dept: PRIMARY CARE CLINIC | Facility: CLINIC | Age: 85
End: 2020-11-30
Payer: MEDICARE

## 2020-11-30 PROCEDURE — 99490 CHRNC CARE MGMT STAFF 1ST 20: CPT | Mod: PBBFAC | Performed by: INTERNAL MEDICINE

## 2020-11-30 PROCEDURE — 99490 PR CHRONIC CARE MGMT, 1ST 20 MIN: ICD-10-PCS | Mod: S$PBB,,, | Performed by: INTERNAL MEDICINE

## 2020-11-30 PROCEDURE — 99490 CHRNC CARE MGMT STAFF 1ST 20: CPT | Mod: S$PBB,,, | Performed by: INTERNAL MEDICINE

## 2020-12-11 ENCOUNTER — PATIENT MESSAGE (OUTPATIENT)
Dept: OTHER | Facility: OTHER | Age: 85
End: 2020-12-11

## 2020-12-15 ENCOUNTER — PATIENT MESSAGE (OUTPATIENT)
Dept: OTHER | Facility: OTHER | Age: 85
End: 2020-12-15

## 2020-12-30 ENCOUNTER — HOSPITAL ENCOUNTER (EMERGENCY)
Facility: HOSPITAL | Age: 85
Discharge: HOME | End: 2020-12-30
Attending: EMERGENCY MEDICINE
Payer: MEDICARE

## 2020-12-30 ENCOUNTER — TELEPHONE (OUTPATIENT)
Dept: INTERNAL MEDICINE | Facility: CLINIC | Age: 85
End: 2020-12-30

## 2020-12-30 VITALS
SYSTOLIC BLOOD PRESSURE: 192 MMHG | TEMPERATURE: 98 F | DIASTOLIC BLOOD PRESSURE: 91 MMHG | WEIGHT: 138 LBS | OXYGEN SATURATION: 96 % | HEIGHT: 64 IN | RESPIRATION RATE: 16 BRPM | HEART RATE: 64 BPM | BODY MASS INDEX: 23.56 KG/M2

## 2020-12-30 DIAGNOSIS — R10.9 RIGHT SIDED ABDOMINAL PAIN: Primary | ICD-10-CM

## 2020-12-30 DIAGNOSIS — N39.0 URINARY TRACT INFECTION WITHOUT HEMATURIA, SITE UNSPECIFIED: ICD-10-CM

## 2020-12-30 LAB
BACTERIA #/AREA URNS AUTO: ABNORMAL /HPF
BASOPHILS # BLD AUTO: 0.04 K/UL (ref 0–0.2)
BASOPHILS NFR BLD: 0.9 % (ref 0–1.9)
BILIRUB UR QL STRIP: NEGATIVE
BUN SERPL-MCNC: 11 MG/DL (ref 6–30)
CHLORIDE SERPL-SCNC: 99 MMOL/L (ref 95–110)
CLARITY UR REFRACT.AUTO: ABNORMAL
COLOR UR AUTO: ABNORMAL
CREAT SERPL-MCNC: 1 MG/DL (ref 0.5–1.4)
DIFFERENTIAL METHOD: ABNORMAL
EOSINOPHIL # BLD AUTO: 0.1 K/UL (ref 0–0.5)
EOSINOPHIL NFR BLD: 1.6 % (ref 0–8)
ERYTHROCYTE [DISTWIDTH] IN BLOOD BY AUTOMATED COUNT: 13.8 % (ref 11.5–14.5)
GLUCOSE SERPL-MCNC: 122 MG/DL (ref 70–110)
GLUCOSE UR QL STRIP: NEGATIVE
HCT VFR BLD AUTO: 39.4 % (ref 37–48.5)
HCT VFR BLD CALC: 39 %PCV (ref 36–54)
HGB BLD-MCNC: 12.5 G/DL (ref 12–16)
HGB UR QL STRIP: NEGATIVE
HYALINE CASTS UR QL AUTO: 0 /LPF
IMM GRANULOCYTES # BLD AUTO: 0.01 K/UL (ref 0–0.04)
IMM GRANULOCYTES NFR BLD AUTO: 0.2 % (ref 0–0.5)
KETONES UR QL STRIP: NEGATIVE
LEUKOCYTE ESTERASE UR QL STRIP: ABNORMAL
LYMPHOCYTES # BLD AUTO: 1.4 K/UL (ref 1–4.8)
LYMPHOCYTES NFR BLD: 31.4 % (ref 18–48)
MCH RBC QN AUTO: 28.7 PG (ref 27–31)
MCHC RBC AUTO-ENTMCNC: 31.7 G/DL (ref 32–36)
MCV RBC AUTO: 90 FL (ref 82–98)
MICROSCOPIC COMMENT: ABNORMAL
MONOCYTES # BLD AUTO: 0.3 K/UL (ref 0.3–1)
MONOCYTES NFR BLD: 6.7 % (ref 4–15)
NEUTROPHILS # BLD AUTO: 2.6 K/UL (ref 1.8–7.7)
NEUTROPHILS NFR BLD: 59.2 % (ref 38–73)
NITRITE UR QL STRIP: NEGATIVE
NRBC BLD-RTO: 0 /100 WBC
PH UR STRIP: 5 [PH] (ref 5–8)
PLATELET # BLD AUTO: 237 K/UL (ref 150–350)
PMV BLD AUTO: 10.2 FL (ref 9.2–12.9)
POC IONIZED CALCIUM: 1.16 MMOL/L (ref 1.06–1.42)
POC TCO2 (MEASURED): 30 MMOL/L (ref 23–29)
POTASSIUM BLD-SCNC: 3.2 MMOL/L (ref 3.5–5.1)
PROT UR QL STRIP: ABNORMAL
RBC # BLD AUTO: 4.36 M/UL (ref 4–5.4)
RBC #/AREA URNS AUTO: 1 /HPF (ref 0–4)
SAMPLE: ABNORMAL
SODIUM BLD-SCNC: 139 MMOL/L (ref 136–145)
SP GR UR STRIP: 1.03 (ref 1–1.03)
SQUAMOUS #/AREA URNS AUTO: 2 /HPF
URN SPEC COLLECT METH UR: ABNORMAL
WBC # BLD AUTO: 4.46 K/UL (ref 3.9–12.7)
WBC #/AREA URNS AUTO: 11 /HPF (ref 0–5)

## 2020-12-30 PROCEDURE — 85025 COMPLETE CBC W/AUTO DIFF WBC: CPT

## 2020-12-30 PROCEDURE — 80047 BASIC METABLC PNL IONIZED CA: CPT

## 2020-12-30 PROCEDURE — 25000003 PHARM REV CODE 250: Performed by: EMERGENCY MEDICINE

## 2020-12-30 PROCEDURE — 87086 URINE CULTURE/COLONY COUNT: CPT

## 2020-12-30 PROCEDURE — 81001 URINALYSIS AUTO W/SCOPE: CPT

## 2020-12-30 PROCEDURE — 99284 PR EMERGENCY DEPT VISIT,LEVEL IV: ICD-10-PCS | Mod: ,,, | Performed by: EMERGENCY MEDICINE

## 2020-12-30 PROCEDURE — 99284 EMERGENCY DEPT VISIT MOD MDM: CPT | Mod: ,,, | Performed by: EMERGENCY MEDICINE

## 2020-12-30 PROCEDURE — 99285 EMERGENCY DEPT VISIT HI MDM: CPT | Mod: 25

## 2020-12-30 RX ORDER — CEPHALEXIN 500 MG/1
500 CAPSULE ORAL 4 TIMES DAILY
Qty: 40 CAPSULE | Refills: 0 | Status: SHIPPED | OUTPATIENT
Start: 2020-12-30 | End: 2021-01-09

## 2020-12-30 RX ORDER — CEPHALEXIN 500 MG/1
500 CAPSULE ORAL
Status: COMPLETED | OUTPATIENT
Start: 2020-12-30 | End: 2020-12-30

## 2020-12-30 RX ADMIN — CEPHALEXIN 500 MG: 500 CAPSULE ORAL at 08:12

## 2020-12-31 ENCOUNTER — EXTERNAL CHRONIC CARE MANAGEMENT (OUTPATIENT)
Dept: PRIMARY CARE CLINIC | Facility: CLINIC | Age: 85
End: 2020-12-31
Payer: MEDICARE

## 2020-12-31 PROCEDURE — 99490 CHRNC CARE MGMT STAFF 1ST 20: CPT | Mod: PBBFAC | Performed by: INTERNAL MEDICINE

## 2020-12-31 PROCEDURE — 99490 CHRNC CARE MGMT STAFF 1ST 20: CPT | Mod: S$PBB,,, | Performed by: INTERNAL MEDICINE

## 2020-12-31 PROCEDURE — 99490 PR CHRONIC CARE MGMT, 1ST 20 MIN: ICD-10-PCS | Mod: S$PBB,,, | Performed by: INTERNAL MEDICINE

## 2020-12-31 NOTE — TELEPHONE ENCOUNTER
----- Message from Sandra Bhat sent at 12/31/2020  9:39 AM CST -----  Contact: daughter Paris [phone]  Patient was seen in the ER  for a uti and was given antibiotics but the doctor mentioned prescribing something for yeast after the antibiotics but did not order anything. Can you please order something for yeast?  Please call patient's daughter when meds have been ordered.      [facility] [place] [phone]

## 2021-01-01 LAB
BACTERIA UR CULT: NORMAL
BACTERIA UR CULT: NORMAL

## 2021-01-06 ENCOUNTER — TELEPHONE (OUTPATIENT)
Dept: INTERNAL MEDICINE | Facility: CLINIC | Age: 86
End: 2021-01-06

## 2021-01-06 DIAGNOSIS — I10 ESSENTIAL HYPERTENSION: Primary | ICD-10-CM

## 2021-01-12 ENCOUNTER — LAB VISIT (OUTPATIENT)
Dept: LAB | Facility: HOSPITAL | Age: 86
End: 2021-01-12
Attending: INTERNAL MEDICINE
Payer: MEDICARE

## 2021-01-12 ENCOUNTER — OFFICE VISIT (OUTPATIENT)
Dept: INTERNAL MEDICINE | Facility: CLINIC | Age: 86
End: 2021-01-12
Payer: MEDICARE

## 2021-01-12 VITALS
HEIGHT: 64 IN | DIASTOLIC BLOOD PRESSURE: 82 MMHG | WEIGHT: 139.31 LBS | SYSTOLIC BLOOD PRESSURE: 128 MMHG | HEART RATE: 75 BPM | OXYGEN SATURATION: 96 % | BODY MASS INDEX: 23.78 KG/M2

## 2021-01-12 DIAGNOSIS — E78.5 HYPERLIPIDEMIA, UNSPECIFIED HYPERLIPIDEMIA TYPE: ICD-10-CM

## 2021-01-12 DIAGNOSIS — I10 ESSENTIAL HYPERTENSION: ICD-10-CM

## 2021-01-12 DIAGNOSIS — E03.9 HYPOTHYROIDISM, UNSPECIFIED TYPE: ICD-10-CM

## 2021-01-12 DIAGNOSIS — N30.00 ACUTE CYSTITIS WITHOUT HEMATURIA: Primary | ICD-10-CM

## 2021-01-12 LAB
ALBUMIN SERPL BCP-MCNC: 3.4 G/DL (ref 3.5–5.2)
ALP SERPL-CCNC: 63 U/L (ref 55–135)
ALT SERPL W/O P-5'-P-CCNC: 7 U/L (ref 10–44)
ANION GAP SERPL CALC-SCNC: 10 MMOL/L (ref 8–16)
AST SERPL-CCNC: 14 U/L (ref 10–40)
BASOPHILS # BLD AUTO: 0.04 K/UL (ref 0–0.2)
BASOPHILS NFR BLD: 1.2 % (ref 0–1.9)
BILIRUB SERPL-MCNC: 1.5 MG/DL (ref 0.1–1)
BUN SERPL-MCNC: 23 MG/DL (ref 8–23)
CALCIUM SERPL-MCNC: 9.5 MG/DL (ref 8.7–10.5)
CHLORIDE SERPL-SCNC: 101 MMOL/L (ref 95–110)
CHOLEST SERPL-MCNC: 229 MG/DL (ref 120–199)
CHOLEST/HDLC SERPL: 2.9 {RATIO} (ref 2–5)
CO2 SERPL-SCNC: 33 MMOL/L (ref 23–29)
CREAT SERPL-MCNC: 1.2 MG/DL (ref 0.5–1.4)
DIFFERENTIAL METHOD: ABNORMAL
EOSINOPHIL # BLD AUTO: 0.2 K/UL (ref 0–0.5)
EOSINOPHIL NFR BLD: 5.2 % (ref 0–8)
ERYTHROCYTE [DISTWIDTH] IN BLOOD BY AUTOMATED COUNT: 14.1 % (ref 11.5–14.5)
EST. GFR  (AFRICAN AMERICAN): 48.3 ML/MIN/1.73 M^2
EST. GFR  (NON AFRICAN AMERICAN): 41.9 ML/MIN/1.73 M^2
GLUCOSE SERPL-MCNC: 99 MG/DL (ref 70–110)
HCT VFR BLD AUTO: 40.6 % (ref 37–48.5)
HDLC SERPL-MCNC: 79 MG/DL (ref 40–75)
HDLC SERPL: 34.5 % (ref 20–50)
HGB BLD-MCNC: 12.5 G/DL (ref 12–16)
IMM GRANULOCYTES # BLD AUTO: 0.01 K/UL (ref 0–0.04)
IMM GRANULOCYTES NFR BLD AUTO: 0.3 % (ref 0–0.5)
LDLC SERPL CALC-MCNC: 134.6 MG/DL (ref 63–159)
LYMPHOCYTES # BLD AUTO: 1.1 K/UL (ref 1–4.8)
LYMPHOCYTES NFR BLD: 31.3 % (ref 18–48)
MCH RBC QN AUTO: 29.3 PG (ref 27–31)
MCHC RBC AUTO-ENTMCNC: 30.8 G/DL (ref 32–36)
MCV RBC AUTO: 95 FL (ref 82–98)
MONOCYTES # BLD AUTO: 0.3 K/UL (ref 0.3–1)
MONOCYTES NFR BLD: 8.7 % (ref 4–15)
NEUTROPHILS # BLD AUTO: 1.8 K/UL (ref 1.8–7.7)
NEUTROPHILS NFR BLD: 53.3 % (ref 38–73)
NONHDLC SERPL-MCNC: 150 MG/DL
NRBC BLD-RTO: 0 /100 WBC
PLATELET # BLD AUTO: 211 K/UL (ref 150–350)
PMV BLD AUTO: 10.5 FL (ref 9.2–12.9)
POTASSIUM SERPL-SCNC: 3.4 MMOL/L (ref 3.5–5.1)
PROT SERPL-MCNC: 7.1 G/DL (ref 6–8.4)
RBC # BLD AUTO: 4.26 M/UL (ref 4–5.4)
SODIUM SERPL-SCNC: 144 MMOL/L (ref 136–145)
T4 FREE SERPL-MCNC: 0.98 NG/DL (ref 0.71–1.51)
TRIGL SERPL-MCNC: 77 MG/DL (ref 30–150)
TSH SERPL DL<=0.005 MIU/L-ACNC: 44.89 UIU/ML (ref 0.4–4)
WBC # BLD AUTO: 3.45 K/UL (ref 3.9–12.7)

## 2021-01-12 PROCEDURE — 85025 COMPLETE CBC W/AUTO DIFF WBC: CPT

## 2021-01-12 PROCEDURE — 84443 ASSAY THYROID STIM HORMONE: CPT

## 2021-01-12 PROCEDURE — 84439 ASSAY OF FREE THYROXINE: CPT

## 2021-01-12 PROCEDURE — 99999 PR PBB SHADOW E&M-EST. PATIENT-LVL IV: ICD-10-PCS | Mod: PBBFAC,,, | Performed by: PHYSICIAN ASSISTANT

## 2021-01-12 PROCEDURE — 80053 COMPREHEN METABOLIC PANEL: CPT

## 2021-01-12 PROCEDURE — 36415 COLL VENOUS BLD VENIPUNCTURE: CPT

## 2021-01-12 PROCEDURE — 99214 PR OFFICE/OUTPT VISIT, EST, LEVL IV, 30-39 MIN: ICD-10-PCS | Mod: S$PBB,,, | Performed by: PHYSICIAN ASSISTANT

## 2021-01-12 PROCEDURE — 99214 OFFICE O/P EST MOD 30 MIN: CPT | Mod: S$PBB,,, | Performed by: PHYSICIAN ASSISTANT

## 2021-01-12 PROCEDURE — 99999 PR PBB SHADOW E&M-EST. PATIENT-LVL IV: CPT | Mod: PBBFAC,,, | Performed by: PHYSICIAN ASSISTANT

## 2021-01-12 PROCEDURE — 99214 OFFICE O/P EST MOD 30 MIN: CPT | Mod: PBBFAC | Performed by: PHYSICIAN ASSISTANT

## 2021-01-12 PROCEDURE — 80061 LIPID PANEL: CPT

## 2021-01-18 ENCOUNTER — TELEPHONE (OUTPATIENT)
Dept: INTERNAL MEDICINE | Facility: CLINIC | Age: 86
End: 2021-01-18

## 2021-01-25 ENCOUNTER — TELEPHONE (OUTPATIENT)
Dept: INTERNAL MEDICINE | Facility: CLINIC | Age: 86
End: 2021-01-25

## 2021-01-31 ENCOUNTER — EXTERNAL CHRONIC CARE MANAGEMENT (OUTPATIENT)
Dept: PRIMARY CARE CLINIC | Facility: CLINIC | Age: 86
End: 2021-01-31
Payer: MEDICARE

## 2021-01-31 PROCEDURE — 99490 CHRNC CARE MGMT STAFF 1ST 20: CPT | Mod: S$PBB,,, | Performed by: INTERNAL MEDICINE

## 2021-01-31 PROCEDURE — 99490 PR CHRONIC CARE MGMT, 1ST 20 MIN: ICD-10-PCS | Mod: S$PBB,,, | Performed by: INTERNAL MEDICINE

## 2021-01-31 PROCEDURE — 99490 CHRNC CARE MGMT STAFF 1ST 20: CPT | Mod: PBBFAC | Performed by: INTERNAL MEDICINE

## 2021-02-15 RX ORDER — LEVOTHYROXINE SODIUM 112 UG/1
TABLET ORAL
Qty: 90 TABLET | Refills: 0 | Status: SHIPPED | OUTPATIENT
Start: 2021-02-15 | End: 2021-06-16

## 2021-02-22 RX ORDER — DONEPEZIL HYDROCHLORIDE 10 MG/1
TABLET, FILM COATED ORAL
Qty: 90 TABLET | Refills: 0 | Status: SHIPPED | OUTPATIENT
Start: 2021-02-22 | End: 2021-06-17

## 2021-02-28 ENCOUNTER — EXTERNAL CHRONIC CARE MANAGEMENT (OUTPATIENT)
Dept: PRIMARY CARE CLINIC | Facility: CLINIC | Age: 86
End: 2021-02-28
Payer: MEDICARE

## 2021-02-28 PROCEDURE — 99490 CHRNC CARE MGMT STAFF 1ST 20: CPT | Mod: PBBFAC | Performed by: INTERNAL MEDICINE

## 2021-02-28 PROCEDURE — 99490 PR CHRONIC CARE MGMT, 1ST 20 MIN: ICD-10-PCS | Mod: S$PBB,,, | Performed by: INTERNAL MEDICINE

## 2021-02-28 PROCEDURE — 99490 CHRNC CARE MGMT STAFF 1ST 20: CPT | Mod: S$PBB,,, | Performed by: INTERNAL MEDICINE

## 2021-03-31 ENCOUNTER — EXTERNAL CHRONIC CARE MANAGEMENT (OUTPATIENT)
Dept: PRIMARY CARE CLINIC | Facility: CLINIC | Age: 86
End: 2021-03-31
Payer: MEDICARE

## 2021-03-31 PROCEDURE — 99490 PR CHRONIC CARE MGMT, 1ST 20 MIN: ICD-10-PCS | Mod: S$PBB,,, | Performed by: INTERNAL MEDICINE

## 2021-03-31 PROCEDURE — 99490 CHRNC CARE MGMT STAFF 1ST 20: CPT | Mod: PBBFAC | Performed by: INTERNAL MEDICINE

## 2021-03-31 PROCEDURE — 99490 CHRNC CARE MGMT STAFF 1ST 20: CPT | Mod: S$PBB,,, | Performed by: INTERNAL MEDICINE

## 2021-04-01 ENCOUNTER — TELEPHONE (OUTPATIENT)
Dept: INTERNAL MEDICINE | Facility: CLINIC | Age: 86
End: 2021-04-01

## 2021-04-09 ENCOUNTER — IMMUNIZATION (OUTPATIENT)
Dept: PRIMARY CARE CLINIC | Facility: CLINIC | Age: 86
End: 2021-04-09
Payer: MEDICARE

## 2021-04-09 DIAGNOSIS — Z23 NEED FOR VACCINATION: Primary | ICD-10-CM

## 2021-04-09 PROCEDURE — 0001A PR IMMUNIZ ADMIN, SARS-COV-2 COVID-19 VACC, 30MCG/0.3ML, 1ST DOSE: CPT | Mod: CV19,S$GLB,, | Performed by: INTERNAL MEDICINE

## 2021-04-09 PROCEDURE — 91300 PR SARS-COV- 2 COVID-19 VACCINE, NO PRSV, 30MCG/0.3ML, IM: ICD-10-PCS | Mod: S$GLB,,, | Performed by: INTERNAL MEDICINE

## 2021-04-09 PROCEDURE — 91300 PR SARS-COV- 2 COVID-19 VACCINE, NO PRSV, 30MCG/0.3ML, IM: CPT | Mod: S$GLB,,, | Performed by: INTERNAL MEDICINE

## 2021-04-09 PROCEDURE — 0001A PR IMMUNIZ ADMIN, SARS-COV-2 COVID-19 VACC, 30MCG/0.3ML, 1ST DOSE: ICD-10-PCS | Mod: CV19,S$GLB,, | Performed by: INTERNAL MEDICINE

## 2021-04-09 RX ADMIN — Medication 0.3 ML: at 02:04

## 2021-04-23 RX ORDER — LISINOPRIL AND HYDROCHLOROTHIAZIDE 20; 25 MG/1; MG/1
1 TABLET ORAL DAILY
Qty: 90 TABLET | Refills: 0 | Status: SHIPPED | OUTPATIENT
Start: 2021-04-23 | End: 2021-06-17

## 2021-04-30 ENCOUNTER — EXTERNAL CHRONIC CARE MANAGEMENT (OUTPATIENT)
Dept: PRIMARY CARE CLINIC | Facility: CLINIC | Age: 86
End: 2021-04-30
Payer: MEDICARE

## 2021-04-30 ENCOUNTER — IMMUNIZATION (OUTPATIENT)
Dept: PRIMARY CARE CLINIC | Facility: CLINIC | Age: 86
End: 2021-04-30
Payer: MEDICARE

## 2021-04-30 DIAGNOSIS — Z23 NEED FOR VACCINATION: Primary | ICD-10-CM

## 2021-04-30 PROCEDURE — 99490 PR CHRONIC CARE MGMT, 1ST 20 MIN: ICD-10-PCS | Mod: S$PBB,,, | Performed by: INTERNAL MEDICINE

## 2021-04-30 PROCEDURE — 91300 PR SARS-COV- 2 COVID-19 VACCINE, NO PRSV, 30MCG/0.3ML, IM: CPT | Mod: S$GLB,,, | Performed by: INTERNAL MEDICINE

## 2021-04-30 PROCEDURE — 91300 PR SARS-COV- 2 COVID-19 VACCINE, NO PRSV, 30MCG/0.3ML, IM: ICD-10-PCS | Mod: S$GLB,,, | Performed by: INTERNAL MEDICINE

## 2021-04-30 PROCEDURE — 0002A PR IMMUNIZ ADMIN, SARS-COV-2 COVID-19 VACC, 30MCG/0.3ML, 2ND DOSE: ICD-10-PCS | Mod: CV19,S$GLB,, | Performed by: INTERNAL MEDICINE

## 2021-04-30 PROCEDURE — 99490 CHRNC CARE MGMT STAFF 1ST 20: CPT | Mod: S$PBB,,, | Performed by: INTERNAL MEDICINE

## 2021-04-30 PROCEDURE — 99490 CHRNC CARE MGMT STAFF 1ST 20: CPT | Mod: PBBFAC | Performed by: INTERNAL MEDICINE

## 2021-04-30 PROCEDURE — 0002A PR IMMUNIZ ADMIN, SARS-COV-2 COVID-19 VACC, 30MCG/0.3ML, 2ND DOSE: CPT | Mod: CV19,S$GLB,, | Performed by: INTERNAL MEDICINE

## 2021-04-30 RX ADMIN — Medication 0.3 ML: at 03:04

## 2021-05-24 ENCOUNTER — PES CALL (OUTPATIENT)
Dept: ADMINISTRATIVE | Facility: CLINIC | Age: 86
End: 2021-05-24

## 2021-05-31 ENCOUNTER — EXTERNAL CHRONIC CARE MANAGEMENT (OUTPATIENT)
Dept: PRIMARY CARE CLINIC | Facility: CLINIC | Age: 86
End: 2021-05-31
Payer: MEDICARE

## 2021-05-31 PROCEDURE — 99490 CHRNC CARE MGMT STAFF 1ST 20: CPT | Mod: S$PBB,,, | Performed by: INTERNAL MEDICINE

## 2021-05-31 PROCEDURE — 99490 CHRNC CARE MGMT STAFF 1ST 20: CPT | Mod: PBBFAC | Performed by: INTERNAL MEDICINE

## 2021-05-31 PROCEDURE — 99490 PR CHRONIC CARE MGMT, 1ST 20 MIN: ICD-10-PCS | Mod: S$PBB,,, | Performed by: INTERNAL MEDICINE

## 2021-06-17 RX ORDER — DONEPEZIL HYDROCHLORIDE 10 MG/1
TABLET, FILM COATED ORAL
Qty: 90 TABLET | Refills: 0 | Status: SHIPPED | OUTPATIENT
Start: 2021-06-17 | End: 2021-11-26 | Stop reason: SDUPTHER

## 2021-06-17 RX ORDER — LISINOPRIL AND HYDROCHLOROTHIAZIDE 20; 25 MG/1; MG/1
1 TABLET ORAL DAILY
Qty: 90 TABLET | Refills: 0 | Status: SHIPPED | OUTPATIENT
Start: 2021-06-17 | End: 2021-09-08

## 2021-06-17 RX ORDER — PRAVASTATIN SODIUM 20 MG/1
TABLET ORAL
Qty: 90 TABLET | Refills: 0 | Status: SHIPPED | OUTPATIENT
Start: 2021-06-17 | End: 2021-11-26 | Stop reason: SDUPTHER

## 2021-06-17 RX ORDER — AMLODIPINE BESYLATE 2.5 MG/1
TABLET ORAL
Qty: 90 TABLET | Refills: 0 | Status: SHIPPED | OUTPATIENT
Start: 2021-06-17 | End: 2021-09-08

## 2021-06-21 ENCOUNTER — LAB VISIT (OUTPATIENT)
Dept: LAB | Facility: HOSPITAL | Age: 86
End: 2021-06-21
Attending: INTERNAL MEDICINE
Payer: MEDICARE

## 2021-06-21 ENCOUNTER — OFFICE VISIT (OUTPATIENT)
Dept: INTERNAL MEDICINE | Facility: CLINIC | Age: 86
End: 2021-06-21
Payer: MEDICARE

## 2021-06-21 VITALS
HEIGHT: 64 IN | OXYGEN SATURATION: 99 % | BODY MASS INDEX: 24.57 KG/M2 | WEIGHT: 143.94 LBS | DIASTOLIC BLOOD PRESSURE: 84 MMHG | SYSTOLIC BLOOD PRESSURE: 120 MMHG | HEART RATE: 68 BPM

## 2021-06-21 DIAGNOSIS — E13.9 DIABETES MELLITUS DUE TO ABNORMAL INSULIN: ICD-10-CM

## 2021-06-21 DIAGNOSIS — I10 ESSENTIAL HYPERTENSION: ICD-10-CM

## 2021-06-21 DIAGNOSIS — E78.5 HYPERLIPIDEMIA, UNSPECIFIED HYPERLIPIDEMIA TYPE: ICD-10-CM

## 2021-06-21 DIAGNOSIS — E03.9 HYPOTHYROIDISM, UNSPECIFIED TYPE: Primary | ICD-10-CM

## 2021-06-21 LAB
ALBUMIN SERPL BCP-MCNC: 3.4 G/DL (ref 3.5–5.2)
ALP SERPL-CCNC: 60 U/L (ref 55–135)
ALT SERPL W/O P-5'-P-CCNC: 5 U/L (ref 10–44)
ANION GAP SERPL CALC-SCNC: 10 MMOL/L (ref 8–16)
AST SERPL-CCNC: 15 U/L (ref 10–40)
BASOPHILS # BLD AUTO: 0.04 K/UL (ref 0–0.2)
BASOPHILS NFR BLD: 1.4 % (ref 0–1.9)
BILIRUB SERPL-MCNC: 0.9 MG/DL (ref 0.1–1)
BUN SERPL-MCNC: 15 MG/DL (ref 8–23)
CALCIUM SERPL-MCNC: 9.8 MG/DL (ref 8.7–10.5)
CHLORIDE SERPL-SCNC: 106 MMOL/L (ref 95–110)
CO2 SERPL-SCNC: 28 MMOL/L (ref 23–29)
CREAT SERPL-MCNC: 0.9 MG/DL (ref 0.5–1.4)
DIFFERENTIAL METHOD: ABNORMAL
EOSINOPHIL # BLD AUTO: 0.1 K/UL (ref 0–0.5)
EOSINOPHIL NFR BLD: 4.7 % (ref 0–8)
ERYTHROCYTE [DISTWIDTH] IN BLOOD BY AUTOMATED COUNT: 13 % (ref 11.5–14.5)
EST. GFR  (AFRICAN AMERICAN): >60 ML/MIN/1.73 M^2
EST. GFR  (NON AFRICAN AMERICAN): 59.3 ML/MIN/1.73 M^2
ESTIMATED AVG GLUCOSE: 100 MG/DL (ref 68–131)
GLUCOSE SERPL-MCNC: 95 MG/DL (ref 70–110)
HBA1C MFR BLD: 5.1 % (ref 4–5.6)
HCT VFR BLD AUTO: 36.3 % (ref 37–48.5)
HGB BLD-MCNC: 11.6 G/DL (ref 12–16)
IMM GRANULOCYTES # BLD AUTO: 0.01 K/UL (ref 0–0.04)
IMM GRANULOCYTES NFR BLD AUTO: 0.3 % (ref 0–0.5)
LYMPHOCYTES # BLD AUTO: 0.9 K/UL (ref 1–4.8)
LYMPHOCYTES NFR BLD: 29.8 % (ref 18–48)
MCH RBC QN AUTO: 29.5 PG (ref 27–31)
MCHC RBC AUTO-ENTMCNC: 32 G/DL (ref 32–36)
MCV RBC AUTO: 92 FL (ref 82–98)
MONOCYTES # BLD AUTO: 0.2 K/UL (ref 0.3–1)
MONOCYTES NFR BLD: 8.1 % (ref 4–15)
NEUTROPHILS # BLD AUTO: 1.6 K/UL (ref 1.8–7.7)
NEUTROPHILS NFR BLD: 55.7 % (ref 38–73)
NRBC BLD-RTO: 0 /100 WBC
PLATELET # BLD AUTO: 245 K/UL (ref 150–450)
PMV BLD AUTO: 10.6 FL (ref 9.2–12.9)
POTASSIUM SERPL-SCNC: 4 MMOL/L (ref 3.5–5.1)
PROT SERPL-MCNC: 7 G/DL (ref 6–8.4)
RBC # BLD AUTO: 3.93 M/UL (ref 4–5.4)
SODIUM SERPL-SCNC: 144 MMOL/L (ref 136–145)
T4 FREE SERPL-MCNC: 0.68 NG/DL (ref 0.71–1.51)
TSH SERPL DL<=0.005 MIU/L-ACNC: 65.76 UIU/ML (ref 0.4–4)
WBC # BLD AUTO: 2.95 K/UL (ref 3.9–12.7)

## 2021-06-21 PROCEDURE — 99214 PR OFFICE/OUTPT VISIT, EST, LEVL IV, 30-39 MIN: ICD-10-PCS | Mod: S$PBB,,, | Performed by: INTERNAL MEDICINE

## 2021-06-21 PROCEDURE — 99214 OFFICE O/P EST MOD 30 MIN: CPT | Mod: S$PBB,,, | Performed by: INTERNAL MEDICINE

## 2021-06-21 PROCEDURE — 99999 PR PBB SHADOW E&M-EST. PATIENT-LVL IV: CPT | Mod: PBBFAC,,, | Performed by: INTERNAL MEDICINE

## 2021-06-21 PROCEDURE — 36415 COLL VENOUS BLD VENIPUNCTURE: CPT | Performed by: INTERNAL MEDICINE

## 2021-06-21 PROCEDURE — 80053 COMPREHEN METABOLIC PANEL: CPT | Performed by: INTERNAL MEDICINE

## 2021-06-21 PROCEDURE — 99214 OFFICE O/P EST MOD 30 MIN: CPT | Mod: PBBFAC | Performed by: INTERNAL MEDICINE

## 2021-06-21 PROCEDURE — 84439 ASSAY OF FREE THYROXINE: CPT | Performed by: INTERNAL MEDICINE

## 2021-06-21 PROCEDURE — 83036 HEMOGLOBIN GLYCOSYLATED A1C: CPT | Performed by: INTERNAL MEDICINE

## 2021-06-21 PROCEDURE — 85025 COMPLETE CBC W/AUTO DIFF WBC: CPT | Performed by: INTERNAL MEDICINE

## 2021-06-21 PROCEDURE — 99999 PR PBB SHADOW E&M-EST. PATIENT-LVL IV: ICD-10-PCS | Mod: PBBFAC,,, | Performed by: INTERNAL MEDICINE

## 2021-06-21 PROCEDURE — 84443 ASSAY THYROID STIM HORMONE: CPT | Performed by: INTERNAL MEDICINE

## 2021-06-28 ENCOUNTER — TELEPHONE (OUTPATIENT)
Dept: INTERNAL MEDICINE | Facility: CLINIC | Age: 86
End: 2021-06-28

## 2021-06-30 ENCOUNTER — EXTERNAL CHRONIC CARE MANAGEMENT (OUTPATIENT)
Dept: PRIMARY CARE CLINIC | Facility: CLINIC | Age: 86
End: 2021-06-30
Payer: MEDICARE

## 2021-06-30 PROCEDURE — 99490 CHRNC CARE MGMT STAFF 1ST 20: CPT | Mod: PBBFAC | Performed by: INTERNAL MEDICINE

## 2021-06-30 PROCEDURE — 99490 CHRNC CARE MGMT STAFF 1ST 20: CPT | Mod: S$PBB,,, | Performed by: INTERNAL MEDICINE

## 2021-06-30 PROCEDURE — 99490 PR CHRONIC CARE MGMT, 1ST 20 MIN: ICD-10-PCS | Mod: S$PBB,,, | Performed by: INTERNAL MEDICINE

## 2021-07-31 ENCOUNTER — EXTERNAL CHRONIC CARE MANAGEMENT (OUTPATIENT)
Dept: PRIMARY CARE CLINIC | Facility: CLINIC | Age: 86
End: 2021-07-31
Payer: MEDICARE

## 2021-07-31 PROCEDURE — 99490 CHRNC CARE MGMT STAFF 1ST 20: CPT | Mod: PBBFAC | Performed by: INTERNAL MEDICINE

## 2021-07-31 PROCEDURE — 99490 CHRNC CARE MGMT STAFF 1ST 20: CPT | Mod: S$PBB,,, | Performed by: INTERNAL MEDICINE

## 2021-07-31 PROCEDURE — 99490 PR CHRONIC CARE MGMT, 1ST 20 MIN: ICD-10-PCS | Mod: S$PBB,,, | Performed by: INTERNAL MEDICINE

## 2021-08-31 ENCOUNTER — EXTERNAL CHRONIC CARE MANAGEMENT (OUTPATIENT)
Dept: PRIMARY CARE CLINIC | Facility: CLINIC | Age: 86
End: 2021-08-31
Payer: MEDICARE

## 2021-08-31 PROCEDURE — 99490 CHRNC CARE MGMT STAFF 1ST 20: CPT | Mod: PBBFAC | Performed by: INTERNAL MEDICINE

## 2021-08-31 PROCEDURE — 99490 PR CHRONIC CARE MGMT, 1ST 20 MIN: ICD-10-PCS | Mod: S$PBB,,, | Performed by: INTERNAL MEDICINE

## 2021-08-31 PROCEDURE — 99490 CHRNC CARE MGMT STAFF 1ST 20: CPT | Mod: S$PBB,,, | Performed by: INTERNAL MEDICINE

## 2021-09-08 RX ORDER — AMLODIPINE BESYLATE 2.5 MG/1
2.5 TABLET ORAL DAILY
Qty: 90 TABLET | Refills: 3 | Status: ON HOLD | OUTPATIENT
Start: 2021-09-08 | End: 2021-09-18 | Stop reason: SDUPTHER

## 2021-09-08 RX ORDER — LISINOPRIL AND HYDROCHLOROTHIAZIDE 20; 25 MG/1; MG/1
1 TABLET ORAL DAILY
Qty: 90 TABLET | Refills: 3 | Status: ON HOLD | OUTPATIENT
Start: 2021-09-08 | End: 2021-09-18 | Stop reason: SDUPTHER

## 2021-09-09 ENCOUNTER — HOSPITAL ENCOUNTER (EMERGENCY)
Facility: HOSPITAL | Age: 86
Discharge: HOME | End: 2021-09-10
Attending: EMERGENCY MEDICINE
Payer: MEDICARE

## 2021-09-09 DIAGNOSIS — Z20.822 SUSPECTED COVID-19 VIRUS INFECTION: ICD-10-CM

## 2021-09-09 DIAGNOSIS — M62.81 MUSCLE WEAKNESS (GENERALIZED): Primary | ICD-10-CM

## 2021-09-09 LAB
ALBUMIN SERPL BCP-MCNC: 3.6 G/DL (ref 3.5–5.2)
ALP SERPL-CCNC: 80 U/L (ref 55–135)
ALT SERPL W/O P-5'-P-CCNC: 9 U/L (ref 10–44)
ANION GAP SERPL CALC-SCNC: 10 MMOL/L (ref 8–16)
AST SERPL-CCNC: 18 U/L (ref 10–40)
BASOPHILS # BLD AUTO: 0.03 K/UL (ref 0–0.2)
BASOPHILS NFR BLD: 0.4 % (ref 0–1.9)
BILIRUB SERPL-MCNC: 1.6 MG/DL (ref 0.1–1)
BUN SERPL-MCNC: 18 MG/DL (ref 8–23)
CALCIUM SERPL-MCNC: 9.8 MG/DL (ref 8.7–10.5)
CHLORIDE SERPL-SCNC: 100 MMOL/L (ref 95–110)
CK SERPL-CCNC: 83 U/L (ref 20–180)
CO2 SERPL-SCNC: 30 MMOL/L (ref 23–29)
CREAT SERPL-MCNC: 1 MG/DL (ref 0.5–1.4)
CRP SERPL-MCNC: 2.6 MG/L (ref 0–8.2)
CTP QC/QA: YES
DIFFERENTIAL METHOD: ABNORMAL
EOSINOPHIL # BLD AUTO: 0.1 K/UL (ref 0–0.5)
EOSINOPHIL NFR BLD: 1.4 % (ref 0–8)
ERYTHROCYTE [DISTWIDTH] IN BLOOD BY AUTOMATED COUNT: 13.4 % (ref 11.5–14.5)
EST. GFR  (AFRICAN AMERICAN): 59.8 ML/MIN/1.73 M^2
EST. GFR  (NON AFRICAN AMERICAN): 51.9 ML/MIN/1.73 M^2
FERRITIN SERPL-MCNC: 29 NG/ML (ref 20–300)
GLUCOSE SERPL-MCNC: 135 MG/DL (ref 70–110)
HCT VFR BLD AUTO: 38.2 % (ref 37–48.5)
HGB BLD-MCNC: 12.7 G/DL (ref 12–16)
IMM GRANULOCYTES # BLD AUTO: 0.02 K/UL (ref 0–0.04)
IMM GRANULOCYTES NFR BLD AUTO: 0.3 % (ref 0–0.5)
LACTATE SERPL-SCNC: 1.5 MMOL/L (ref 0.5–2.2)
LDH SERPL L TO P-CCNC: 310 U/L (ref 110–260)
LYMPHOCYTES # BLD AUTO: 0.5 K/UL (ref 1–4.8)
LYMPHOCYTES NFR BLD: 6.4 % (ref 18–48)
MCH RBC QN AUTO: 30.4 PG (ref 27–31)
MCHC RBC AUTO-ENTMCNC: 33.2 G/DL (ref 32–36)
MCV RBC AUTO: 91 FL (ref 82–98)
MONOCYTES # BLD AUTO: 0.4 K/UL (ref 0.3–1)
MONOCYTES NFR BLD: 5.2 % (ref 4–15)
NEUTROPHILS # BLD AUTO: 6.6 K/UL (ref 1.8–7.7)
NEUTROPHILS NFR BLD: 86.3 % (ref 38–73)
NRBC BLD-RTO: 0 /100 WBC
PLATELET # BLD AUTO: 226 K/UL (ref 150–450)
PMV BLD AUTO: 10 FL (ref 9.2–12.9)
POTASSIUM SERPL-SCNC: 3.8 MMOL/L (ref 3.5–5.1)
PROT SERPL-MCNC: 8 G/DL (ref 6–8.4)
RBC # BLD AUTO: 4.18 M/UL (ref 4–5.4)
SARS-COV-2 RDRP RESP QL NAA+PROBE: NEGATIVE
SODIUM SERPL-SCNC: 140 MMOL/L (ref 136–145)
TROPONIN I SERPL DL<=0.01 NG/ML-MCNC: <0.006 NG/ML (ref 0–0.03)
WBC # BLD AUTO: 7.65 K/UL (ref 3.9–12.7)

## 2021-09-09 PROCEDURE — 99284 EMERGENCY DEPT VISIT MOD MDM: CPT | Mod: CS,,, | Performed by: EMERGENCY MEDICINE

## 2021-09-09 PROCEDURE — 82728 ASSAY OF FERRITIN: CPT | Performed by: EMERGENCY MEDICINE

## 2021-09-09 PROCEDURE — 83615 LACTATE (LD) (LDH) ENZYME: CPT | Performed by: EMERGENCY MEDICINE

## 2021-09-09 PROCEDURE — 99284 PR EMERGENCY DEPT VISIT,LEVEL IV: ICD-10-PCS | Mod: CS,,, | Performed by: EMERGENCY MEDICINE

## 2021-09-09 PROCEDURE — 84484 ASSAY OF TROPONIN QUANT: CPT | Performed by: EMERGENCY MEDICINE

## 2021-09-09 PROCEDURE — 93005 ELECTROCARDIOGRAM TRACING: CPT

## 2021-09-09 PROCEDURE — 83605 ASSAY OF LACTIC ACID: CPT | Performed by: EMERGENCY MEDICINE

## 2021-09-09 PROCEDURE — U0002 COVID-19 LAB TEST NON-CDC: HCPCS | Performed by: EMERGENCY MEDICINE

## 2021-09-09 PROCEDURE — 82550 ASSAY OF CK (CPK): CPT | Performed by: EMERGENCY MEDICINE

## 2021-09-09 PROCEDURE — 93010 EKG 12-LEAD: ICD-10-PCS | Mod: ,,, | Performed by: INTERNAL MEDICINE

## 2021-09-09 PROCEDURE — 80053 COMPREHEN METABOLIC PANEL: CPT | Performed by: EMERGENCY MEDICINE

## 2021-09-09 PROCEDURE — 85025 COMPLETE CBC W/AUTO DIFF WBC: CPT | Performed by: EMERGENCY MEDICINE

## 2021-09-09 PROCEDURE — 99284 EMERGENCY DEPT VISIT MOD MDM: CPT | Mod: 25

## 2021-09-09 PROCEDURE — 86140 C-REACTIVE PROTEIN: CPT | Performed by: EMERGENCY MEDICINE

## 2021-09-09 PROCEDURE — 81003 URINALYSIS AUTO W/O SCOPE: CPT | Performed by: STUDENT IN AN ORGANIZED HEALTH CARE EDUCATION/TRAINING PROGRAM

## 2021-09-09 PROCEDURE — 93010 ELECTROCARDIOGRAM REPORT: CPT | Mod: ,,, | Performed by: INTERNAL MEDICINE

## 2021-09-10 VITALS
DIASTOLIC BLOOD PRESSURE: 71 MMHG | TEMPERATURE: 98 F | WEIGHT: 154 LBS | HEART RATE: 58 BPM | HEIGHT: 64 IN | BODY MASS INDEX: 26.29 KG/M2 | OXYGEN SATURATION: 94 % | SYSTOLIC BLOOD PRESSURE: 136 MMHG | RESPIRATION RATE: 20 BRPM

## 2021-09-10 LAB
BILIRUB UR QL STRIP: NEGATIVE
CLARITY UR REFRACT.AUTO: CLEAR
COLOR UR AUTO: YELLOW
GLUCOSE UR QL STRIP: NEGATIVE
HGB UR QL STRIP: NEGATIVE
KETONES UR QL STRIP: NEGATIVE
LEUKOCYTE ESTERASE UR QL STRIP: NEGATIVE
NITRITE UR QL STRIP: NEGATIVE
PH UR STRIP: 5 [PH] (ref 5–8)
PROT UR QL STRIP: NEGATIVE
SP GR UR STRIP: 1.02 (ref 1–1.03)
URN SPEC COLLECT METH UR: NORMAL

## 2021-09-15 ENCOUNTER — TELEPHONE (OUTPATIENT)
Dept: INTERNAL MEDICINE | Facility: CLINIC | Age: 86
End: 2021-09-15

## 2021-09-15 ENCOUNTER — HOSPITAL ENCOUNTER (INPATIENT)
Facility: HOSPITAL | Age: 86
LOS: 3 days | Discharge: HOME HEALTH | DRG: 177 | End: 2021-09-18
Attending: EMERGENCY MEDICINE | Admitting: EMERGENCY MEDICINE
Payer: MEDICARE

## 2021-09-15 DIAGNOSIS — R07.9 CHEST PAIN: ICD-10-CM

## 2021-09-15 DIAGNOSIS — U07.1 ACUTE HYPOXEMIC RESPIRATORY FAILURE DUE TO COVID-19: ICD-10-CM

## 2021-09-15 DIAGNOSIS — N17.9 AKI (ACUTE KIDNEY INJURY): ICD-10-CM

## 2021-09-15 DIAGNOSIS — U07.1 COVID-19: Primary | ICD-10-CM

## 2021-09-15 DIAGNOSIS — R53.81 PHYSICAL DEBILITY: ICD-10-CM

## 2021-09-15 DIAGNOSIS — I63.9 CEREBRAL INFARCTION, UNSPECIFIED MECHANISM: ICD-10-CM

## 2021-09-15 DIAGNOSIS — R41.3 MEMORY LOSS: ICD-10-CM

## 2021-09-15 DIAGNOSIS — J96.01 ACUTE HYPOXEMIC RESPIRATORY FAILURE DUE TO COVID-19: ICD-10-CM

## 2021-09-15 PROBLEM — D64.9 ANEMIA: Status: ACTIVE | Noted: 2021-09-15

## 2021-09-15 LAB
25(OH)D3+25(OH)D2 SERPL-MCNC: 34 NG/ML (ref 30–96)
ALBUMIN SERPL BCP-MCNC: 3.3 G/DL (ref 3.5–5.2)
ALLENS TEST: ABNORMAL
ALP SERPL-CCNC: 67 U/L (ref 55–135)
ALT SERPL W/O P-5'-P-CCNC: 19 U/L (ref 10–44)
ANION GAP SERPL CALC-SCNC: 12 MMOL/L (ref 8–16)
APTT BLDCRRT: 28.9 SEC (ref 21–32)
AST SERPL-CCNC: 33 U/L (ref 10–40)
BACTERIA #/AREA URNS AUTO: ABNORMAL /HPF
BASOPHILS # BLD AUTO: 0.01 K/UL (ref 0–0.2)
BASOPHILS # BLD AUTO: 0.01 K/UL (ref 0–0.2)
BASOPHILS NFR BLD: 0.2 % (ref 0–1.9)
BASOPHILS NFR BLD: 0.2 % (ref 0–1.9)
BILIRUB SERPL-MCNC: 1 MG/DL (ref 0.1–1)
BILIRUB UR QL STRIP: NEGATIVE
BNP SERPL-MCNC: 41 PG/ML (ref 0–99)
BUN SERPL-MCNC: 40 MG/DL (ref 8–23)
CALCIUM SERPL-MCNC: 9.6 MG/DL (ref 8.7–10.5)
CHLORIDE SERPL-SCNC: 96 MMOL/L (ref 95–110)
CK SERPL-CCNC: 152 U/L (ref 20–180)
CLARITY UR REFRACT.AUTO: ABNORMAL
CO2 SERPL-SCNC: 30 MMOL/L (ref 23–29)
COLOR UR AUTO: ABNORMAL
CREAT SERPL-MCNC: 2.4 MG/DL (ref 0.5–1.4)
CREAT UR-MCNC: 245 MG/DL (ref 15–325)
CTP QC/QA: YES
D DIMER PPP IA.FEU-MCNC: 0.26 MG/L FEU
DIFFERENTIAL METHOD: ABNORMAL
DIFFERENTIAL METHOD: ABNORMAL
EOSINOPHIL # BLD AUTO: 0 K/UL (ref 0–0.5)
EOSINOPHIL # BLD AUTO: 0 K/UL (ref 0–0.5)
EOSINOPHIL NFR BLD: 0.6 % (ref 0–8)
EOSINOPHIL NFR BLD: 0.6 % (ref 0–8)
ERYTHROCYTE [DISTWIDTH] IN BLOOD BY AUTOMATED COUNT: 14.4 % (ref 11.5–14.5)
ERYTHROCYTE [DISTWIDTH] IN BLOOD BY AUTOMATED COUNT: 14.6 % (ref 11.5–14.5)
ERYTHROCYTE [SEDIMENTATION RATE] IN BLOOD BY WESTERGREN METHOD: 96 MM/HR (ref 0–36)
EST. GFR  (AFRICAN AMERICAN): 20.7 ML/MIN/1.73 M^2
EST. GFR  (NON AFRICAN AMERICAN): 18 ML/MIN/1.73 M^2
FACT X PPP CHRO-ACNC: <0.1 IU/ML (ref 0.3–0.7)
FERRITIN SERPL-MCNC: 167 NG/ML (ref 20–300)
FERRITIN SERPL-MCNC: 167 NG/ML (ref 20–300)
GLUCOSE SERPL-MCNC: 106 MG/DL (ref 70–110)
GLUCOSE UR QL STRIP: NEGATIVE
HCO3 UR-SCNC: 34.3 MMOL/L (ref 24–28)
HCT VFR BLD AUTO: 34.5 % (ref 37–48.5)
HCT VFR BLD AUTO: 38.1 % (ref 37–48.5)
HGB BLD-MCNC: 10.9 G/DL (ref 12–16)
HGB BLD-MCNC: 11.9 G/DL (ref 12–16)
HGB UR QL STRIP: NEGATIVE
HYALINE CASTS UR QL AUTO: 0 /LPF
IMM GRANULOCYTES # BLD AUTO: 0.02 K/UL (ref 0–0.04)
IMM GRANULOCYTES # BLD AUTO: 0.03 K/UL (ref 0–0.04)
IMM GRANULOCYTES NFR BLD AUTO: 0.4 % (ref 0–0.5)
IMM GRANULOCYTES NFR BLD AUTO: 0.5 % (ref 0–0.5)
INR PPP: 0.9 (ref 0.8–1.2)
KETONES UR QL STRIP: ABNORMAL
LDH SERPL L TO P-CCNC: 247 U/L (ref 110–260)
LDH SERPL L TO P-CCNC: 247 U/L (ref 110–260)
LEUKOCYTE ESTERASE UR QL STRIP: ABNORMAL
LYMPHOCYTES # BLD AUTO: 0.6 K/UL (ref 1–4.8)
LYMPHOCYTES # BLD AUTO: 0.7 K/UL (ref 1–4.8)
LYMPHOCYTES NFR BLD: 11 % (ref 18–48)
LYMPHOCYTES NFR BLD: 11.1 % (ref 18–48)
MCH RBC QN AUTO: 29 PG (ref 27–31)
MCH RBC QN AUTO: 29 PG (ref 27–31)
MCHC RBC AUTO-ENTMCNC: 31.2 G/DL (ref 32–36)
MCHC RBC AUTO-ENTMCNC: 31.6 G/DL (ref 32–36)
MCV RBC AUTO: 92 FL (ref 82–98)
MCV RBC AUTO: 93 FL (ref 82–98)
MICROSCOPIC COMMENT: ABNORMAL
MONOCYTES # BLD AUTO: 0.3 K/UL (ref 0.3–1)
MONOCYTES # BLD AUTO: 0.4 K/UL (ref 0.3–1)
MONOCYTES NFR BLD: 5.6 % (ref 4–15)
MONOCYTES NFR BLD: 6.6 % (ref 4–15)
NEUTROPHILS # BLD AUTO: 4.4 K/UL (ref 1.8–7.7)
NEUTROPHILS # BLD AUTO: 5.4 K/UL (ref 1.8–7.7)
NEUTROPHILS NFR BLD: 81 % (ref 38–73)
NEUTROPHILS NFR BLD: 82.2 % (ref 38–73)
NITRITE UR QL STRIP: NEGATIVE
NRBC BLD-RTO: 0 /100 WBC
NRBC BLD-RTO: 0 /100 WBC
OSMOLALITY SERPL: 304 MOSM/KG (ref 275–295)
OSMOLALITY UR: 471 MOSM/KG (ref 50–1200)
PCO2 BLDA: 67.8 MMHG (ref 35–45)
PH SMN: 7.31 [PH] (ref 7.35–7.45)
PH UR STRIP: 5 [PH] (ref 5–8)
PLATELET # BLD AUTO: 180 K/UL (ref 150–450)
PLATELET # BLD AUTO: 190 K/UL (ref 150–450)
PMV BLD AUTO: 10.2 FL (ref 9.2–12.9)
PMV BLD AUTO: 10.5 FL (ref 9.2–12.9)
PO2 BLDA: 14 MMHG (ref 40–60)
POC BE: 8 MMOL/L
POC SATURATED O2: 14 % (ref 95–100)
POC TCO2: 36 MMOL/L (ref 24–29)
POTASSIUM SERPL-SCNC: 3.9 MMOL/L (ref 3.5–5.1)
PROCALCITONIN SERPL IA-MCNC: 0.17 NG/ML
PROT SERPL-MCNC: 7.8 G/DL (ref 6–8.4)
PROT UR QL STRIP: ABNORMAL
PROT UR-MCNC: 118 MG/DL (ref 0–15)
PROT/CREAT UR: 0.48 MG/G{CREAT} (ref 0–0.2)
PROTHROMBIN TIME: 10.2 SEC (ref 9–12.5)
RBC # BLD AUTO: 3.76 M/UL (ref 4–5.4)
RBC # BLD AUTO: 4.11 M/UL (ref 4–5.4)
RBC #/AREA URNS AUTO: 2 /HPF (ref 0–4)
SAMPLE: ABNORMAL
SARS-COV-2 RDRP RESP QL NAA+PROBE: POSITIVE
SITE: ABNORMAL
SODIUM SERPL-SCNC: 138 MMOL/L (ref 136–145)
SODIUM UR-SCNC: 65 MMOL/L (ref 20–250)
SP GR UR STRIP: 1.02 (ref 1–1.03)
SQUAMOUS #/AREA URNS AUTO: 6 /HPF
TROPONIN I SERPL DL<=0.01 NG/ML-MCNC: <0.006 NG/ML (ref 0–0.03)
TSH SERPL DL<=0.005 MIU/L-ACNC: 2.6 UIU/ML (ref 0.4–4)
URN SPEC COLLECT METH UR: ABNORMAL
WBC # BLD AUTO: 5.36 K/UL (ref 3.9–12.7)
WBC # BLD AUTO: 6.65 K/UL (ref 3.9–12.7)
WBC #/AREA URNS AUTO: 9 /HPF (ref 0–5)

## 2021-09-15 PROCEDURE — 93010 ELECTROCARDIOGRAM REPORT: CPT | Mod: ,,, | Performed by: INTERNAL MEDICINE

## 2021-09-15 PROCEDURE — 85730 THROMBOPLASTIN TIME PARTIAL: CPT | Performed by: STUDENT IN AN ORGANIZED HEALTH CARE EDUCATION/TRAINING PROGRAM

## 2021-09-15 PROCEDURE — 63700000 PHARM REV CODE 250 ALT 637 W/O HCPCS: Performed by: STUDENT IN AN ORGANIZED HEALTH CARE EDUCATION/TRAINING PROGRAM

## 2021-09-15 PROCEDURE — 84484 ASSAY OF TROPONIN QUANT: CPT | Performed by: EMERGENCY MEDICINE

## 2021-09-15 PROCEDURE — U0002 COVID-19 LAB TEST NON-CDC: HCPCS | Performed by: EMERGENCY MEDICINE

## 2021-09-15 PROCEDURE — 85025 COMPLETE CBC W/AUTO DIFF WBC: CPT | Mod: 91 | Performed by: EMERGENCY MEDICINE

## 2021-09-15 PROCEDURE — 85652 RBC SED RATE AUTOMATED: CPT | Performed by: STUDENT IN AN ORGANIZED HEALTH CARE EDUCATION/TRAINING PROGRAM

## 2021-09-15 PROCEDURE — 99285 EMERGENCY DEPT VISIT HI MDM: CPT | Mod: 25

## 2021-09-15 PROCEDURE — 27000207 HC ISOLATION

## 2021-09-15 PROCEDURE — 80053 COMPREHEN METABOLIC PANEL: CPT | Performed by: EMERGENCY MEDICINE

## 2021-09-15 PROCEDURE — 99900035 HC TECH TIME PER 15 MIN (STAT)

## 2021-09-15 PROCEDURE — 82728 ASSAY OF FERRITIN: CPT | Performed by: STUDENT IN AN ORGANIZED HEALTH CARE EDUCATION/TRAINING PROGRAM

## 2021-09-15 PROCEDURE — 84300 ASSAY OF URINE SODIUM: CPT | Performed by: EMERGENCY MEDICINE

## 2021-09-15 PROCEDURE — 99285 EMERGENCY DEPT VISIT HI MDM: CPT | Mod: CR,GC,CS, | Performed by: EMERGENCY MEDICINE

## 2021-09-15 PROCEDURE — 25000003 PHARM REV CODE 250: Performed by: STUDENT IN AN ORGANIZED HEALTH CARE EDUCATION/TRAINING PROGRAM

## 2021-09-15 PROCEDURE — 93005 ELECTROCARDIOGRAM TRACING: CPT

## 2021-09-15 PROCEDURE — 85610 PROTHROMBIN TIME: CPT | Performed by: STUDENT IN AN ORGANIZED HEALTH CARE EDUCATION/TRAINING PROGRAM

## 2021-09-15 PROCEDURE — 85025 COMPLETE CBC W/AUTO DIFF WBC: CPT | Performed by: STUDENT IN AN ORGANIZED HEALTH CARE EDUCATION/TRAINING PROGRAM

## 2021-09-15 PROCEDURE — 84156 ASSAY OF PROTEIN URINE: CPT | Performed by: EMERGENCY MEDICINE

## 2021-09-15 PROCEDURE — 83880 ASSAY OF NATRIURETIC PEPTIDE: CPT | Performed by: EMERGENCY MEDICINE

## 2021-09-15 PROCEDURE — 63600175 PHARM REV CODE 636 W HCPCS: Performed by: STUDENT IN AN ORGANIZED HEALTH CARE EDUCATION/TRAINING PROGRAM

## 2021-09-15 PROCEDURE — 84145 PROCALCITONIN (PCT): CPT | Performed by: STUDENT IN AN ORGANIZED HEALTH CARE EDUCATION/TRAINING PROGRAM

## 2021-09-15 PROCEDURE — 83930 ASSAY OF BLOOD OSMOLALITY: CPT | Performed by: STUDENT IN AN ORGANIZED HEALTH CARE EDUCATION/TRAINING PROGRAM

## 2021-09-15 PROCEDURE — 83935 ASSAY OF URINE OSMOLALITY: CPT | Performed by: EMERGENCY MEDICINE

## 2021-09-15 PROCEDURE — 82306 VITAMIN D 25 HYDROXY: CPT | Performed by: STUDENT IN AN ORGANIZED HEALTH CARE EDUCATION/TRAINING PROGRAM

## 2021-09-15 PROCEDURE — 84443 ASSAY THYROID STIM HORMONE: CPT | Performed by: STUDENT IN AN ORGANIZED HEALTH CARE EDUCATION/TRAINING PROGRAM

## 2021-09-15 PROCEDURE — 99285 PR EMERGENCY DEPT VISIT,LEVEL V: ICD-10-PCS | Mod: CR,GC,CS, | Performed by: EMERGENCY MEDICINE

## 2021-09-15 PROCEDURE — 87040 BLOOD CULTURE FOR BACTERIA: CPT | Performed by: STUDENT IN AN ORGANIZED HEALTH CARE EDUCATION/TRAINING PROGRAM

## 2021-09-15 PROCEDURE — 81001 URINALYSIS AUTO W/SCOPE: CPT | Performed by: EMERGENCY MEDICINE

## 2021-09-15 PROCEDURE — 83615 LACTATE (LD) (LDH) ENZYME: CPT | Performed by: STUDENT IN AN ORGANIZED HEALTH CARE EDUCATION/TRAINING PROGRAM

## 2021-09-15 PROCEDURE — 82803 BLOOD GASES ANY COMBINATION: CPT

## 2021-09-15 PROCEDURE — 85520 HEPARIN ASSAY: CPT | Performed by: STUDENT IN AN ORGANIZED HEALTH CARE EDUCATION/TRAINING PROGRAM

## 2021-09-15 PROCEDURE — 85379 FIBRIN DEGRADATION QUANT: CPT | Performed by: STUDENT IN AN ORGANIZED HEALTH CARE EDUCATION/TRAINING PROGRAM

## 2021-09-15 PROCEDURE — 82550 ASSAY OF CK (CPK): CPT | Performed by: STUDENT IN AN ORGANIZED HEALTH CARE EDUCATION/TRAINING PROGRAM

## 2021-09-15 PROCEDURE — 93010 EKG 12-LEAD: ICD-10-PCS | Mod: ,,, | Performed by: INTERNAL MEDICINE

## 2021-09-15 PROCEDURE — 12000002 HC ACUTE/MED SURGE SEMI-PRIVATE ROOM

## 2021-09-15 RX ORDER — GUAIFENESIN/DEXTROMETHORPHAN 100-10MG/5
10 SYRUP ORAL EVERY 4 HOURS PRN
Status: DISCONTINUED | OUTPATIENT
Start: 2021-09-15 | End: 2021-09-18 | Stop reason: HOSPADM

## 2021-09-15 RX ORDER — TALC
6 POWDER (GRAM) TOPICAL NIGHTLY PRN
Status: DISCONTINUED | OUTPATIENT
Start: 2021-09-15 | End: 2021-09-18 | Stop reason: HOSPADM

## 2021-09-15 RX ORDER — IBUPROFEN 200 MG
16 TABLET ORAL
Status: DISCONTINUED | OUTPATIENT
Start: 2021-09-15 | End: 2021-09-18 | Stop reason: HOSPADM

## 2021-09-15 RX ORDER — ALBUTEROL SULFATE 90 UG/1
2 AEROSOL, METERED RESPIRATORY (INHALATION) EVERY 6 HOURS
Status: DISCONTINUED | OUTPATIENT
Start: 2021-09-16 | End: 2021-09-18 | Stop reason: HOSPADM

## 2021-09-15 RX ORDER — ACETAMINOPHEN 500 MG
1000 TABLET ORAL EVERY 8 HOURS PRN
Status: DISCONTINUED | OUTPATIENT
Start: 2021-09-15 | End: 2021-09-18 | Stop reason: HOSPADM

## 2021-09-15 RX ORDER — SODIUM CHLORIDE 0.9 % (FLUSH) 0.9 %
10 SYRINGE (ML) INJECTION
Status: DISCONTINUED | OUTPATIENT
Start: 2021-09-15 | End: 2021-09-18 | Stop reason: HOSPADM

## 2021-09-15 RX ORDER — DONEPEZIL HYDROCHLORIDE 10 MG/1
10 TABLET, FILM COATED ORAL DAILY
Status: DISCONTINUED | OUTPATIENT
Start: 2021-09-16 | End: 2021-09-18 | Stop reason: HOSPADM

## 2021-09-15 RX ORDER — CEFTRIAXONE 1 G/1
1 INJECTION, POWDER, FOR SOLUTION INTRAMUSCULAR; INTRAVENOUS ONCE
Status: COMPLETED | OUTPATIENT
Start: 2021-09-15 | End: 2021-09-15

## 2021-09-15 RX ORDER — CHOLECALCIFEROL (VITAMIN D3) 25 MCG
1000 TABLET ORAL DAILY
Status: DISCONTINUED | OUTPATIENT
Start: 2021-09-16 | End: 2021-09-18 | Stop reason: HOSPADM

## 2021-09-15 RX ORDER — GLUCAGON 1 MG
1 KIT INJECTION
Status: DISCONTINUED | OUTPATIENT
Start: 2021-09-15 | End: 2021-09-18 | Stop reason: HOSPADM

## 2021-09-15 RX ORDER — AMLODIPINE BESYLATE 2.5 MG/1
2.5 TABLET ORAL DAILY
Status: DISCONTINUED | OUTPATIENT
Start: 2021-09-16 | End: 2021-09-16

## 2021-09-15 RX ORDER — PROCHLORPERAZINE EDISYLATE 5 MG/ML
5 INJECTION INTRAMUSCULAR; INTRAVENOUS EVERY 6 HOURS PRN
Status: DISCONTINUED | OUTPATIENT
Start: 2021-09-15 | End: 2021-09-18 | Stop reason: HOSPADM

## 2021-09-15 RX ORDER — HEPARIN SODIUM,PORCINE/D5W 25000/250
0-40 INTRAVENOUS SOLUTION INTRAVENOUS CONTINUOUS
Status: DISCONTINUED | OUTPATIENT
Start: 2021-09-15 | End: 2021-09-17

## 2021-09-15 RX ORDER — AZITHROMYCIN 250 MG/1
500 TABLET, FILM COATED ORAL
Status: DISCONTINUED | OUTPATIENT
Start: 2021-09-15 | End: 2021-09-16

## 2021-09-15 RX ORDER — ACETAMINOPHEN 325 MG/1
650 TABLET ORAL
Status: COMPLETED | OUTPATIENT
Start: 2021-09-15 | End: 2021-09-15

## 2021-09-15 RX ORDER — DORZOLAMIDE HCL 20 MG/ML
1 SOLUTION/ DROPS OPHTHALMIC 3 TIMES DAILY
Status: DISCONTINUED | OUTPATIENT
Start: 2021-09-15 | End: 2021-09-18 | Stop reason: HOSPADM

## 2021-09-15 RX ORDER — LEVOTHYROXINE SODIUM 112 UG/1
112 TABLET ORAL DAILY
Status: DISCONTINUED | OUTPATIENT
Start: 2021-09-16 | End: 2021-09-18 | Stop reason: HOSPADM

## 2021-09-15 RX ORDER — NAPROXEN SODIUM 220 MG/1
81 TABLET, FILM COATED ORAL DAILY
Status: DISCONTINUED | OUTPATIENT
Start: 2021-09-16 | End: 2021-09-18 | Stop reason: HOSPADM

## 2021-09-15 RX ORDER — LISINOPRIL AND HYDROCHLOROTHIAZIDE 10; 12.5 MG/1; MG/1
2 TABLET ORAL DAILY
Status: DISCONTINUED | OUTPATIENT
Start: 2021-09-16 | End: 2021-09-16

## 2021-09-15 RX ORDER — ONDANSETRON 2 MG/ML
4 INJECTION INTRAMUSCULAR; INTRAVENOUS EVERY 8 HOURS PRN
Status: DISCONTINUED | OUTPATIENT
Start: 2021-09-15 | End: 2021-09-18 | Stop reason: HOSPADM

## 2021-09-15 RX ORDER — IBUPROFEN 200 MG
24 TABLET ORAL
Status: DISCONTINUED | OUTPATIENT
Start: 2021-09-15 | End: 2021-09-18 | Stop reason: HOSPADM

## 2021-09-15 RX ORDER — PRAVASTATIN SODIUM 10 MG/1
20 TABLET ORAL DAILY
Status: DISCONTINUED | OUTPATIENT
Start: 2021-09-16 | End: 2021-09-18 | Stop reason: HOSPADM

## 2021-09-15 RX ORDER — LATANOPROST 50 UG/ML
1 SOLUTION/ DROPS OPHTHALMIC NIGHTLY
Status: DISCONTINUED | OUTPATIENT
Start: 2021-09-15 | End: 2021-09-18 | Stop reason: HOSPADM

## 2021-09-15 RX ORDER — ACETAMINOPHEN 325 MG/1
650 TABLET ORAL EVERY 4 HOURS PRN
Status: DISCONTINUED | OUTPATIENT
Start: 2021-09-15 | End: 2021-09-18 | Stop reason: HOSPADM

## 2021-09-15 RX ORDER — LOPERAMIDE HYDROCHLORIDE 2 MG/1
2 CAPSULE ORAL EVERY 6 HOURS PRN
Status: DISCONTINUED | OUTPATIENT
Start: 2021-09-15 | End: 2021-09-18 | Stop reason: HOSPADM

## 2021-09-15 RX ADMIN — LATANOPROST 1 DROP: 50 SOLUTION OPHTHALMIC at 11:09

## 2021-09-15 RX ADMIN — HEPARIN SODIUM AND DEXTROSE 12 UNITS/KG/HR: 10000; 5 INJECTION INTRAVENOUS at 10:09

## 2021-09-15 RX ADMIN — DEXAMETHASONE 6 MG: 4 TABLET ORAL at 10:09

## 2021-09-15 RX ADMIN — REMDESIVIR 200 MG: 100 INJECTION, POWDER, LYOPHILIZED, FOR SOLUTION INTRAVENOUS at 10:09

## 2021-09-15 RX ADMIN — AZITHROMYCIN MONOHYDRATE 500 MG: 250 TABLET ORAL at 10:09

## 2021-09-15 RX ADMIN — SODIUM CHLORIDE 1000 ML: 0.9 INJECTION, SOLUTION INTRAVENOUS at 07:09

## 2021-09-15 RX ADMIN — DORZOLAMIDE HYDROCHLORIDE 1 DROP: 20 SOLUTION/ DROPS OPHTHALMIC at 11:09

## 2021-09-15 RX ADMIN — CEFTRIAXONE SODIUM 1 G: 1 INJECTION, POWDER, FOR SOLUTION INTRAMUSCULAR; INTRAVENOUS at 10:09

## 2021-09-15 RX ADMIN — ACETAMINOPHEN 650 MG: 325 TABLET ORAL at 08:09

## 2021-09-16 PROBLEM — R33.8 ACUTE URINARY RETENTION: Status: ACTIVE | Noted: 2021-09-16

## 2021-09-16 LAB
ALBUMIN SERPL BCP-MCNC: 2.7 G/DL (ref 3.5–5.2)
ALP SERPL-CCNC: 61 U/L (ref 55–135)
ALT SERPL W/O P-5'-P-CCNC: 18 U/L (ref 10–44)
ANION GAP SERPL CALC-SCNC: 13 MMOL/L (ref 8–16)
AST SERPL-CCNC: 31 U/L (ref 10–40)
BASOPHILS # BLD AUTO: 0.01 K/UL (ref 0–0.2)
BASOPHILS # BLD AUTO: 0.01 K/UL (ref 0–0.2)
BASOPHILS NFR BLD: 0.2 % (ref 0–1.9)
BASOPHILS NFR BLD: 0.2 % (ref 0–1.9)
BILIRUB SERPL-MCNC: 0.6 MG/DL (ref 0.1–1)
BUN SERPL-MCNC: 41 MG/DL (ref 8–23)
CALCIUM SERPL-MCNC: 8.8 MG/DL (ref 8.7–10.5)
CHLORIDE SERPL-SCNC: 102 MMOL/L (ref 95–110)
CO2 SERPL-SCNC: 24 MMOL/L (ref 23–29)
CREAT SERPL-MCNC: 1.7 MG/DL (ref 0.5–1.4)
CRP SERPL-MCNC: 78.6 MG/L (ref 0–8.2)
DIFFERENTIAL METHOD: ABNORMAL
DIFFERENTIAL METHOD: ABNORMAL
EOSINOPHIL # BLD AUTO: 0 K/UL (ref 0–0.5)
EOSINOPHIL # BLD AUTO: 0 K/UL (ref 0–0.5)
EOSINOPHIL NFR BLD: 0.2 % (ref 0–8)
EOSINOPHIL NFR BLD: 0.2 % (ref 0–8)
ERYTHROCYTE [DISTWIDTH] IN BLOOD BY AUTOMATED COUNT: 14.4 % (ref 11.5–14.5)
ERYTHROCYTE [DISTWIDTH] IN BLOOD BY AUTOMATED COUNT: 14.4 % (ref 11.5–14.5)
EST. GFR  (AFRICAN AMERICAN): 31.5 ML/MIN/1.73 M^2
EST. GFR  (NON AFRICAN AMERICAN): 27.3 ML/MIN/1.73 M^2
FACT X PPP CHRO-ACNC: 0.37 IU/ML (ref 0.3–0.7)
FACT X PPP CHRO-ACNC: 0.84 IU/ML (ref 0.3–0.7)
GLUCOSE SERPL-MCNC: 133 MG/DL (ref 70–110)
HCT VFR BLD AUTO: 33.4 % (ref 37–48.5)
HCT VFR BLD AUTO: 33.4 % (ref 37–48.5)
HGB BLD-MCNC: 10.6 G/DL (ref 12–16)
HGB BLD-MCNC: 10.6 G/DL (ref 12–16)
IMM GRANULOCYTES # BLD AUTO: 0.03 K/UL (ref 0–0.04)
IMM GRANULOCYTES # BLD AUTO: 0.03 K/UL (ref 0–0.04)
IMM GRANULOCYTES NFR BLD AUTO: 0.6 % (ref 0–0.5)
IMM GRANULOCYTES NFR BLD AUTO: 0.6 % (ref 0–0.5)
LYMPHOCYTES # BLD AUTO: 0.6 K/UL (ref 1–4.8)
LYMPHOCYTES # BLD AUTO: 0.6 K/UL (ref 1–4.8)
LYMPHOCYTES NFR BLD: 11.2 % (ref 18–48)
LYMPHOCYTES NFR BLD: 11.2 % (ref 18–48)
MAGNESIUM SERPL-MCNC: 2.1 MG/DL (ref 1.6–2.6)
MCH RBC QN AUTO: 29.5 PG (ref 27–31)
MCH RBC QN AUTO: 29.5 PG (ref 27–31)
MCHC RBC AUTO-ENTMCNC: 31.7 G/DL (ref 32–36)
MCHC RBC AUTO-ENTMCNC: 31.7 G/DL (ref 32–36)
MCV RBC AUTO: 93 FL (ref 82–98)
MCV RBC AUTO: 93 FL (ref 82–98)
MONOCYTES # BLD AUTO: 0.2 K/UL (ref 0.3–1)
MONOCYTES # BLD AUTO: 0.2 K/UL (ref 0.3–1)
MONOCYTES NFR BLD: 3 % (ref 4–15)
MONOCYTES NFR BLD: 3 % (ref 4–15)
NEUTROPHILS # BLD AUTO: 4.2 K/UL (ref 1.8–7.7)
NEUTROPHILS # BLD AUTO: 4.2 K/UL (ref 1.8–7.7)
NEUTROPHILS NFR BLD: 84.8 % (ref 38–73)
NEUTROPHILS NFR BLD: 84.8 % (ref 38–73)
NRBC BLD-RTO: 0 /100 WBC
NRBC BLD-RTO: 0 /100 WBC
PHOSPHATE SERPL-MCNC: 4.2 MG/DL (ref 2.7–4.5)
PLATELET # BLD AUTO: 186 K/UL (ref 150–450)
PLATELET # BLD AUTO: 186 K/UL (ref 150–450)
PMV BLD AUTO: 10.5 FL (ref 9.2–12.9)
PMV BLD AUTO: 10.5 FL (ref 9.2–12.9)
POTASSIUM SERPL-SCNC: 4.1 MMOL/L (ref 3.5–5.1)
PROT SERPL-MCNC: 6.9 G/DL (ref 6–8.4)
RBC # BLD AUTO: 3.59 M/UL (ref 4–5.4)
RBC # BLD AUTO: 3.59 M/UL (ref 4–5.4)
SODIUM SERPL-SCNC: 139 MMOL/L (ref 136–145)
WBC # BLD AUTO: 4.98 K/UL (ref 3.9–12.7)
WBC # BLD AUTO: 4.98 K/UL (ref 3.9–12.7)

## 2021-09-16 PROCEDURE — 27000221 HC OXYGEN, UP TO 24 HOURS

## 2021-09-16 PROCEDURE — 36415 COLL VENOUS BLD VENIPUNCTURE: CPT | Performed by: STUDENT IN AN ORGANIZED HEALTH CARE EDUCATION/TRAINING PROGRAM

## 2021-09-16 PROCEDURE — 27000207 HC ISOLATION

## 2021-09-16 PROCEDURE — 83735 ASSAY OF MAGNESIUM: CPT | Performed by: STUDENT IN AN ORGANIZED HEALTH CARE EDUCATION/TRAINING PROGRAM

## 2021-09-16 PROCEDURE — 85520 HEPARIN ASSAY: CPT | Performed by: STUDENT IN AN ORGANIZED HEALTH CARE EDUCATION/TRAINING PROGRAM

## 2021-09-16 PROCEDURE — 25000003 PHARM REV CODE 250: Performed by: STUDENT IN AN ORGANIZED HEALTH CARE EDUCATION/TRAINING PROGRAM

## 2021-09-16 PROCEDURE — 11000001 HC ACUTE MED/SURG PRIVATE ROOM

## 2021-09-16 PROCEDURE — 80053 COMPREHEN METABOLIC PANEL: CPT | Performed by: STUDENT IN AN ORGANIZED HEALTH CARE EDUCATION/TRAINING PROGRAM

## 2021-09-16 PROCEDURE — 36415 COLL VENOUS BLD VENIPUNCTURE: CPT | Performed by: HOSPITALIST

## 2021-09-16 PROCEDURE — 85520 HEPARIN ASSAY: CPT | Mod: 91 | Performed by: HOSPITALIST

## 2021-09-16 PROCEDURE — 63600175 PHARM REV CODE 636 W HCPCS: Performed by: STUDENT IN AN ORGANIZED HEALTH CARE EDUCATION/TRAINING PROGRAM

## 2021-09-16 PROCEDURE — 99900035 HC TECH TIME PER 15 MIN (STAT)

## 2021-09-16 PROCEDURE — 94640 AIRWAY INHALATION TREATMENT: CPT

## 2021-09-16 PROCEDURE — 94761 N-INVAS EAR/PLS OXIMETRY MLT: CPT

## 2021-09-16 PROCEDURE — 85025 COMPLETE CBC W/AUTO DIFF WBC: CPT | Performed by: STUDENT IN AN ORGANIZED HEALTH CARE EDUCATION/TRAINING PROGRAM

## 2021-09-16 PROCEDURE — 86140 C-REACTIVE PROTEIN: CPT | Performed by: STUDENT IN AN ORGANIZED HEALTH CARE EDUCATION/TRAINING PROGRAM

## 2021-09-16 PROCEDURE — 99223 1ST HOSP IP/OBS HIGH 75: CPT | Mod: CR,AI,GC, | Performed by: HOSPITALIST

## 2021-09-16 PROCEDURE — 25000242 PHARM REV CODE 250 ALT 637 W/ HCPCS: Performed by: STUDENT IN AN ORGANIZED HEALTH CARE EDUCATION/TRAINING PROGRAM

## 2021-09-16 PROCEDURE — 99223 PR INITIAL HOSPITAL CARE,LEVL III: ICD-10-PCS | Mod: CR,AI,GC, | Performed by: HOSPITALIST

## 2021-09-16 PROCEDURE — 84100 ASSAY OF PHOSPHORUS: CPT | Performed by: STUDENT IN AN ORGANIZED HEALTH CARE EDUCATION/TRAINING PROGRAM

## 2021-09-16 PROCEDURE — 25000003 PHARM REV CODE 250: Performed by: HOSPITALIST

## 2021-09-16 RX ADMIN — PRAVASTATIN SODIUM 20 MG: 10 TABLET ORAL at 09:09

## 2021-09-16 RX ADMIN — ASPIRIN 81 MG CHEWABLE TABLET 81 MG: 81 TABLET CHEWABLE at 09:09

## 2021-09-16 RX ADMIN — ALBUTEROL SULFATE 2 PUFF: 108 INHALANT RESPIRATORY (INHALATION) at 02:09

## 2021-09-16 RX ADMIN — DEXAMETHASONE 6 MG: 4 TABLET ORAL at 09:09

## 2021-09-16 RX ADMIN — AMLODIPINE BESYLATE 2.5 MG: 2.5 TABLET ORAL at 09:09

## 2021-09-16 RX ADMIN — DORZOLAMIDE HYDROCHLORIDE 1 DROP: 20 SOLUTION/ DROPS OPHTHALMIC at 09:09

## 2021-09-16 RX ADMIN — LEVOTHYROXINE SODIUM 112 MCG: 112 TABLET ORAL at 07:09

## 2021-09-16 RX ADMIN — ALBUTEROL SULFATE 2 PUFF: 108 INHALANT RESPIRATORY (INHALATION) at 07:09

## 2021-09-16 RX ADMIN — LATANOPROST 1 DROP: 50 SOLUTION OPHTHALMIC at 09:09

## 2021-09-16 RX ADMIN — ALBUTEROL SULFATE 2 PUFF: 108 INHALANT RESPIRATORY (INHALATION) at 04:09

## 2021-09-16 RX ADMIN — REMDESIVIR 100 MG: 100 INJECTION, POWDER, LYOPHILIZED, FOR SOLUTION INTRAVENOUS at 03:09

## 2021-09-16 RX ADMIN — Medication 1000 UNITS: at 09:09

## 2021-09-16 RX ADMIN — MULTIPLE VITAMINS W/ MINERALS TAB 1 TABLET: TAB at 09:09

## 2021-09-16 RX ADMIN — DONEPEZIL HYDROCHLORIDE 10 MG: 10 TABLET ORAL at 09:09

## 2021-09-16 RX ADMIN — SODIUM CHLORIDE 250 ML: 0.9 INJECTION, SOLUTION INTRAVENOUS at 03:09

## 2021-09-17 LAB
ALBUMIN SERPL BCP-MCNC: 2.5 G/DL (ref 3.5–5.2)
ALP SERPL-CCNC: 52 U/L (ref 55–135)
ALT SERPL W/O P-5'-P-CCNC: 14 U/L (ref 10–44)
ANION GAP SERPL CALC-SCNC: 10 MMOL/L (ref 8–16)
ANISOCYTOSIS BLD QL SMEAR: SLIGHT
ANISOCYTOSIS BLD QL SMEAR: SLIGHT
AST SERPL-CCNC: 27 U/L (ref 10–40)
BASOPHILS # BLD AUTO: 0 K/UL (ref 0–0.2)
BASOPHILS # BLD AUTO: 0 K/UL (ref 0–0.2)
BASOPHILS NFR BLD: 0 % (ref 0–1.9)
BASOPHILS NFR BLD: 0 % (ref 0–1.9)
BILIRUB SERPL-MCNC: 0.4 MG/DL (ref 0.1–1)
BUN SERPL-MCNC: 43 MG/DL (ref 8–23)
CALCIUM SERPL-MCNC: 8.6 MG/DL (ref 8.7–10.5)
CHLORIDE SERPL-SCNC: 104 MMOL/L (ref 95–110)
CO2 SERPL-SCNC: 24 MMOL/L (ref 23–29)
CREAT SERPL-MCNC: 1.1 MG/DL (ref 0.5–1.4)
D DIMER PPP IA.FEU-MCNC: 0.51 MG/L FEU
DIFFERENTIAL METHOD: ABNORMAL
DIFFERENTIAL METHOD: ABNORMAL
EOSINOPHIL # BLD AUTO: 0 K/UL (ref 0–0.5)
EOSINOPHIL # BLD AUTO: 0 K/UL (ref 0–0.5)
EOSINOPHIL NFR BLD: 0 % (ref 0–8)
EOSINOPHIL NFR BLD: 0 % (ref 0–8)
ERYTHROCYTE [DISTWIDTH] IN BLOOD BY AUTOMATED COUNT: 14.4 % (ref 11.5–14.5)
ERYTHROCYTE [DISTWIDTH] IN BLOOD BY AUTOMATED COUNT: 14.4 % (ref 11.5–14.5)
EST. GFR  (AFRICAN AMERICAN): 53.3 ML/MIN/1.73 M^2
EST. GFR  (NON AFRICAN AMERICAN): 46.2 ML/MIN/1.73 M^2
FACT X PPP CHRO-ACNC: 0.38 IU/ML (ref 0.3–0.7)
FACT X PPP CHRO-ACNC: 0.44 IU/ML (ref 0.3–0.7)
FERRITIN SERPL-MCNC: 225 NG/ML (ref 20–300)
GLUCOSE SERPL-MCNC: 123 MG/DL (ref 70–110)
HCT VFR BLD AUTO: 36.1 % (ref 37–48.5)
HCT VFR BLD AUTO: 36.1 % (ref 37–48.5)
HGB BLD-MCNC: 11.2 G/DL (ref 12–16)
HGB BLD-MCNC: 11.2 G/DL (ref 12–16)
HYPOCHROMIA BLD QL SMEAR: ABNORMAL
HYPOCHROMIA BLD QL SMEAR: ABNORMAL
IMM GRANULOCYTES # BLD AUTO: 0.02 K/UL (ref 0–0.04)
IMM GRANULOCYTES # BLD AUTO: 0.02 K/UL (ref 0–0.04)
IMM GRANULOCYTES NFR BLD AUTO: 0.5 % (ref 0–0.5)
IMM GRANULOCYTES NFR BLD AUTO: 0.5 % (ref 0–0.5)
LYMPHOCYTES # BLD AUTO: 0.5 K/UL (ref 1–4.8)
LYMPHOCYTES # BLD AUTO: 0.5 K/UL (ref 1–4.8)
LYMPHOCYTES NFR BLD: 13.8 % (ref 18–48)
LYMPHOCYTES NFR BLD: 13.8 % (ref 18–48)
MAGNESIUM SERPL-MCNC: 2.1 MG/DL (ref 1.6–2.6)
MCH RBC QN AUTO: 28.7 PG (ref 27–31)
MCH RBC QN AUTO: 28.7 PG (ref 27–31)
MCHC RBC AUTO-ENTMCNC: 31 G/DL (ref 32–36)
MCHC RBC AUTO-ENTMCNC: 31 G/DL (ref 32–36)
MCV RBC AUTO: 93 FL (ref 82–98)
MCV RBC AUTO: 93 FL (ref 82–98)
MONOCYTES # BLD AUTO: 0.1 K/UL (ref 0.3–1)
MONOCYTES # BLD AUTO: 0.1 K/UL (ref 0.3–1)
MONOCYTES NFR BLD: 2.9 % (ref 4–15)
MONOCYTES NFR BLD: 2.9 % (ref 4–15)
NEUTROPHILS # BLD AUTO: 3.1 K/UL (ref 1.8–7.7)
NEUTROPHILS # BLD AUTO: 3.1 K/UL (ref 1.8–7.7)
NEUTROPHILS NFR BLD: 82.8 % (ref 38–73)
NEUTROPHILS NFR BLD: 82.8 % (ref 38–73)
NRBC BLD-RTO: 0 /100 WBC
NRBC BLD-RTO: 0 /100 WBC
PHOSPHATE SERPL-MCNC: 3.2 MG/DL (ref 2.7–4.5)
PLATELET # BLD AUTO: 196 K/UL (ref 150–450)
PLATELET # BLD AUTO: 196 K/UL (ref 150–450)
PLATELET BLD QL SMEAR: ABNORMAL
PLATELET BLD QL SMEAR: ABNORMAL
PMV BLD AUTO: 10.8 FL (ref 9.2–12.9)
PMV BLD AUTO: 10.8 FL (ref 9.2–12.9)
POTASSIUM SERPL-SCNC: 4.6 MMOL/L (ref 3.5–5.1)
PROT SERPL-MCNC: 6.3 G/DL (ref 6–8.4)
RBC # BLD AUTO: 3.9 M/UL (ref 4–5.4)
RBC # BLD AUTO: 3.9 M/UL (ref 4–5.4)
SODIUM SERPL-SCNC: 138 MMOL/L (ref 136–145)
WBC # BLD AUTO: 3.78 K/UL (ref 3.9–12.7)
WBC # BLD AUTO: 3.78 K/UL (ref 3.9–12.7)

## 2021-09-17 PROCEDURE — 27000221 HC OXYGEN, UP TO 24 HOURS

## 2021-09-17 PROCEDURE — 99233 PR SUBSEQUENT HOSPITAL CARE,LEVL III: ICD-10-PCS | Mod: CR,GC,, | Performed by: HOSPITALIST

## 2021-09-17 PROCEDURE — 36415 COLL VENOUS BLD VENIPUNCTURE: CPT | Performed by: HOSPITALIST

## 2021-09-17 PROCEDURE — 25000003 PHARM REV CODE 250: Performed by: STUDENT IN AN ORGANIZED HEALTH CARE EDUCATION/TRAINING PROGRAM

## 2021-09-17 PROCEDURE — 85520 HEPARIN ASSAY: CPT | Performed by: HOSPITALIST

## 2021-09-17 PROCEDURE — 94761 N-INVAS EAR/PLS OXIMETRY MLT: CPT

## 2021-09-17 PROCEDURE — 63600175 PHARM REV CODE 636 W HCPCS: Performed by: STUDENT IN AN ORGANIZED HEALTH CARE EDUCATION/TRAINING PROGRAM

## 2021-09-17 PROCEDURE — 27000207 HC ISOLATION

## 2021-09-17 PROCEDURE — 82728 ASSAY OF FERRITIN: CPT | Performed by: HOSPITALIST

## 2021-09-17 PROCEDURE — 63600175 PHARM REV CODE 636 W HCPCS: Performed by: HOSPITALIST

## 2021-09-17 PROCEDURE — 80053 COMPREHEN METABOLIC PANEL: CPT | Performed by: STUDENT IN AN ORGANIZED HEALTH CARE EDUCATION/TRAINING PROGRAM

## 2021-09-17 PROCEDURE — 99233 SBSQ HOSP IP/OBS HIGH 50: CPT | Mod: CR,GC,, | Performed by: HOSPITALIST

## 2021-09-17 PROCEDURE — 85025 COMPLETE CBC W/AUTO DIFF WBC: CPT | Performed by: STUDENT IN AN ORGANIZED HEALTH CARE EDUCATION/TRAINING PROGRAM

## 2021-09-17 PROCEDURE — 94640 AIRWAY INHALATION TREATMENT: CPT

## 2021-09-17 PROCEDURE — 85379 FIBRIN DEGRADATION QUANT: CPT | Performed by: HOSPITALIST

## 2021-09-17 PROCEDURE — 84100 ASSAY OF PHOSPHORUS: CPT | Performed by: STUDENT IN AN ORGANIZED HEALTH CARE EDUCATION/TRAINING PROGRAM

## 2021-09-17 PROCEDURE — 83735 ASSAY OF MAGNESIUM: CPT | Performed by: STUDENT IN AN ORGANIZED HEALTH CARE EDUCATION/TRAINING PROGRAM

## 2021-09-17 PROCEDURE — 85520 HEPARIN ASSAY: CPT | Mod: 91 | Performed by: STUDENT IN AN ORGANIZED HEALTH CARE EDUCATION/TRAINING PROGRAM

## 2021-09-17 PROCEDURE — 11000001 HC ACUTE MED/SURG PRIVATE ROOM

## 2021-09-17 RX ORDER — ENOXAPARIN SODIUM 100 MG/ML
1 INJECTION SUBCUTANEOUS
Status: CANCELLED | OUTPATIENT
Start: 2021-09-17

## 2021-09-17 RX ORDER — INSULIN ASPART 100 [IU]/ML
0-5 INJECTION, SOLUTION INTRAVENOUS; SUBCUTANEOUS
Status: DISCONTINUED | OUTPATIENT
Start: 2021-09-17 | End: 2021-09-18 | Stop reason: HOSPADM

## 2021-09-17 RX ORDER — ENOXAPARIN SODIUM 100 MG/ML
1 INJECTION SUBCUTANEOUS 2 TIMES DAILY
Status: DISCONTINUED | OUTPATIENT
Start: 2021-09-17 | End: 2021-09-18 | Stop reason: HOSPADM

## 2021-09-17 RX ADMIN — PRAVASTATIN SODIUM 20 MG: 10 TABLET ORAL at 09:09

## 2021-09-17 RX ADMIN — ALBUTEROL SULFATE 2 PUFF: 108 INHALANT RESPIRATORY (INHALATION) at 01:09

## 2021-09-17 RX ADMIN — LATANOPROST 1 DROP: 50 SOLUTION OPHTHALMIC at 08:09

## 2021-09-17 RX ADMIN — LEVOTHYROXINE SODIUM 112 MCG: 112 TABLET ORAL at 06:09

## 2021-09-17 RX ADMIN — ALBUTEROL SULFATE 2 PUFF: 108 INHALANT RESPIRATORY (INHALATION) at 10:09

## 2021-09-17 RX ADMIN — DORZOLAMIDE HYDROCHLORIDE 1 DROP: 20 SOLUTION/ DROPS OPHTHALMIC at 09:09

## 2021-09-17 RX ADMIN — DORZOLAMIDE HYDROCHLORIDE 1 DROP: 20 SOLUTION/ DROPS OPHTHALMIC at 08:09

## 2021-09-17 RX ADMIN — DEXAMETHASONE 6 MG: 4 TABLET ORAL at 08:09

## 2021-09-17 RX ADMIN — ENOXAPARIN SODIUM 70 MG: 80 INJECTION SUBCUTANEOUS at 08:09

## 2021-09-17 RX ADMIN — ASPIRIN 81 MG CHEWABLE TABLET 81 MG: 81 TABLET CHEWABLE at 09:09

## 2021-09-17 RX ADMIN — ENOXAPARIN SODIUM 70 MG: 80 INJECTION SUBCUTANEOUS at 01:09

## 2021-09-17 RX ADMIN — DONEPEZIL HYDROCHLORIDE 10 MG: 10 TABLET ORAL at 09:09

## 2021-09-17 RX ADMIN — HEPARIN SODIUM AND DEXTROSE 9 UNITS/KG/HR: 10000; 5 INJECTION INTRAVENOUS at 07:09

## 2021-09-17 RX ADMIN — Medication 1000 UNITS: at 09:09

## 2021-09-17 RX ADMIN — REMDESIVIR 100 MG: 100 INJECTION, POWDER, LYOPHILIZED, FOR SOLUTION INTRAVENOUS at 09:09

## 2021-09-17 RX ADMIN — DORZOLAMIDE HYDROCHLORIDE 1 DROP: 20 SOLUTION/ DROPS OPHTHALMIC at 04:09

## 2021-09-17 RX ADMIN — ALBUTEROL SULFATE 2 PUFF: 108 INHALANT RESPIRATORY (INHALATION) at 08:09

## 2021-09-17 RX ADMIN — MULTIPLE VITAMINS W/ MINERALS TAB 1 TABLET: TAB at 09:09

## 2021-09-18 VITALS
WEIGHT: 154 LBS | TEMPERATURE: 98 F | SYSTOLIC BLOOD PRESSURE: 136 MMHG | RESPIRATION RATE: 16 BRPM | HEIGHT: 64 IN | OXYGEN SATURATION: 92 % | HEART RATE: 63 BPM | DIASTOLIC BLOOD PRESSURE: 78 MMHG | BODY MASS INDEX: 26.29 KG/M2

## 2021-09-18 DIAGNOSIS — U07.1 COVID-19 VIRUS DETECTED: ICD-10-CM

## 2021-09-18 LAB
ALBUMIN SERPL BCP-MCNC: 2.5 G/DL (ref 3.5–5.2)
ALP SERPL-CCNC: 52 U/L (ref 55–135)
ALT SERPL W/O P-5'-P-CCNC: 13 U/L (ref 10–44)
ANION GAP SERPL CALC-SCNC: 10 MMOL/L (ref 8–16)
AST SERPL-CCNC: 22 U/L (ref 10–40)
BASOPHILS # BLD AUTO: 0 K/UL (ref 0–0.2)
BASOPHILS NFR BLD: 0 % (ref 0–1.9)
BILIRUB SERPL-MCNC: 0.4 MG/DL (ref 0.1–1)
BUN SERPL-MCNC: 47 MG/DL (ref 8–23)
CALCIUM SERPL-MCNC: 9 MG/DL (ref 8.7–10.5)
CHLORIDE SERPL-SCNC: 103 MMOL/L (ref 95–110)
CO2 SERPL-SCNC: 28 MMOL/L (ref 23–29)
CREAT SERPL-MCNC: 1 MG/DL (ref 0.5–1.4)
CRP SERPL-MCNC: 42.9 MG/L (ref 0–8.2)
DIFFERENTIAL METHOD: ABNORMAL
EOSINOPHIL # BLD AUTO: 0 K/UL (ref 0–0.5)
EOSINOPHIL NFR BLD: 0 % (ref 0–8)
ERYTHROCYTE [DISTWIDTH] IN BLOOD BY AUTOMATED COUNT: 14.3 % (ref 11.5–14.5)
EST. GFR  (AFRICAN AMERICAN): 59.8 ML/MIN/1.73 M^2
EST. GFR  (NON AFRICAN AMERICAN): 51.9 ML/MIN/1.73 M^2
GLUCOSE SERPL-MCNC: 136 MG/DL (ref 70–110)
HCT VFR BLD AUTO: 36.5 % (ref 37–48.5)
HGB BLD-MCNC: 11.8 G/DL (ref 12–16)
IMM GRANULOCYTES # BLD AUTO: 0.02 K/UL (ref 0–0.04)
IMM GRANULOCYTES NFR BLD AUTO: 0.6 % (ref 0–0.5)
LYMPHOCYTES # BLD AUTO: 0.6 K/UL (ref 1–4.8)
LYMPHOCYTES NFR BLD: 17.2 % (ref 18–48)
MAGNESIUM SERPL-MCNC: 1.9 MG/DL (ref 1.6–2.6)
MCH RBC QN AUTO: 29.7 PG (ref 27–31)
MCHC RBC AUTO-ENTMCNC: 32.3 G/DL (ref 32–36)
MCV RBC AUTO: 92 FL (ref 82–98)
MONOCYTES # BLD AUTO: 0.1 K/UL (ref 0.3–1)
MONOCYTES NFR BLD: 3.6 % (ref 4–15)
NEUTROPHILS # BLD AUTO: 2.6 K/UL (ref 1.8–7.7)
NEUTROPHILS NFR BLD: 78.6 % (ref 38–73)
NRBC BLD-RTO: 0 /100 WBC
PHOSPHATE SERPL-MCNC: 2.9 MG/DL (ref 2.7–4.5)
PLATELET # BLD AUTO: 224 K/UL (ref 150–450)
PMV BLD AUTO: 10.9 FL (ref 9.2–12.9)
POCT GLUCOSE: 117 MG/DL (ref 70–110)
POCT GLUCOSE: 124 MG/DL (ref 70–110)
POCT GLUCOSE: 126 MG/DL (ref 70–110)
POCT GLUCOSE: 183 MG/DL (ref 70–110)
POCT GLUCOSE: 322 MG/DL (ref 70–110)
POCT GLUCOSE: 433 MG/DL (ref 70–110)
POTASSIUM SERPL-SCNC: 4.9 MMOL/L (ref 3.5–5.1)
PROT SERPL-MCNC: 6.5 G/DL (ref 6–8.4)
RBC # BLD AUTO: 3.97 M/UL (ref 4–5.4)
SODIUM SERPL-SCNC: 141 MMOL/L (ref 136–145)
WBC # BLD AUTO: 3.31 K/UL (ref 3.9–12.7)

## 2021-09-18 PROCEDURE — 83735 ASSAY OF MAGNESIUM: CPT | Performed by: STUDENT IN AN ORGANIZED HEALTH CARE EDUCATION/TRAINING PROGRAM

## 2021-09-18 PROCEDURE — 36415 COLL VENOUS BLD VENIPUNCTURE: CPT | Performed by: STUDENT IN AN ORGANIZED HEALTH CARE EDUCATION/TRAINING PROGRAM

## 2021-09-18 PROCEDURE — 99239 PR HOSPITAL DISCHARGE DAY,>30 MIN: ICD-10-PCS | Mod: CR,GC,, | Performed by: HOSPITALIST

## 2021-09-18 PROCEDURE — 86140 C-REACTIVE PROTEIN: CPT | Performed by: STUDENT IN AN ORGANIZED HEALTH CARE EDUCATION/TRAINING PROGRAM

## 2021-09-18 PROCEDURE — 85025 COMPLETE CBC W/AUTO DIFF WBC: CPT | Performed by: STUDENT IN AN ORGANIZED HEALTH CARE EDUCATION/TRAINING PROGRAM

## 2021-09-18 PROCEDURE — 84100 ASSAY OF PHOSPHORUS: CPT | Performed by: STUDENT IN AN ORGANIZED HEALTH CARE EDUCATION/TRAINING PROGRAM

## 2021-09-18 PROCEDURE — 25000003 PHARM REV CODE 250: Performed by: STUDENT IN AN ORGANIZED HEALTH CARE EDUCATION/TRAINING PROGRAM

## 2021-09-18 PROCEDURE — 94640 AIRWAY INHALATION TREATMENT: CPT

## 2021-09-18 PROCEDURE — 97161 PT EVAL LOW COMPLEX 20 MIN: CPT

## 2021-09-18 PROCEDURE — 80053 COMPREHEN METABOLIC PANEL: CPT | Performed by: STUDENT IN AN ORGANIZED HEALTH CARE EDUCATION/TRAINING PROGRAM

## 2021-09-18 PROCEDURE — 97116 GAIT TRAINING THERAPY: CPT

## 2021-09-18 PROCEDURE — 99239 HOSP IP/OBS DSCHRG MGMT >30: CPT | Mod: CR,GC,, | Performed by: HOSPITALIST

## 2021-09-18 PROCEDURE — 63600175 PHARM REV CODE 636 W HCPCS: Performed by: HOSPITALIST

## 2021-09-18 RX ORDER — LEVOTHYROXINE SODIUM 112 UG/1
112 TABLET ORAL DAILY
Qty: 30 TABLET | Refills: 2 | Status: SHIPPED | OUTPATIENT
Start: 2021-09-19 | End: 2021-11-26 | Stop reason: SDUPTHER

## 2021-09-18 RX ORDER — ACETAMINOPHEN 325 MG/1
650 TABLET ORAL EVERY 4 HOURS PRN
Qty: 60 TABLET | Refills: 1 | Status: SHIPPED | OUTPATIENT
Start: 2021-09-18

## 2021-09-18 RX ORDER — GUAIFENESIN/DEXTROMETHORPHAN 100-10MG/5
10 SYRUP ORAL EVERY 4 HOURS PRN
Qty: 118 ML | Refills: 0 | Status: SHIPPED | OUTPATIENT
Start: 2021-09-18 | End: 2021-09-28

## 2021-09-18 RX ORDER — ALBUTEROL SULFATE 90 UG/1
2 AEROSOL, METERED RESPIRATORY (INHALATION) EVERY 6 HOURS PRN
Qty: 18 G | Refills: 2 | Status: SHIPPED | OUTPATIENT
Start: 2021-09-18 | End: 2021-11-26

## 2021-09-18 RX ORDER — AMLODIPINE BESYLATE 2.5 MG/1
2.5 TABLET ORAL DAILY
Qty: 90 TABLET | Refills: 3 | Status: SHIPPED | OUTPATIENT
Start: 2021-09-18 | End: 2021-11-26

## 2021-09-18 RX ORDER — LISINOPRIL AND HYDROCHLOROTHIAZIDE 20; 25 MG/1; MG/1
1 TABLET ORAL DAILY
Qty: 90 TABLET | Refills: 3 | Status: SHIPPED | OUTPATIENT
Start: 2021-09-18 | End: 2021-11-26

## 2021-09-18 RX ADMIN — Medication 1000 UNITS: at 08:09

## 2021-09-18 RX ADMIN — ALBUTEROL SULFATE 2 PUFF: 108 INHALANT RESPIRATORY (INHALATION) at 09:09

## 2021-09-18 RX ADMIN — ALBUTEROL SULFATE 2 PUFF: 108 INHALANT RESPIRATORY (INHALATION) at 12:09

## 2021-09-18 RX ADMIN — LEVOTHYROXINE SODIUM 112 MCG: 112 TABLET ORAL at 08:09

## 2021-09-18 RX ADMIN — PRAVASTATIN SODIUM 20 MG: 10 TABLET ORAL at 08:09

## 2021-09-18 RX ADMIN — ASPIRIN 81 MG CHEWABLE TABLET 81 MG: 81 TABLET CHEWABLE at 08:09

## 2021-09-18 RX ADMIN — DONEPEZIL HYDROCHLORIDE 10 MG: 10 TABLET ORAL at 08:09

## 2021-09-18 RX ADMIN — ALBUTEROL SULFATE 2 PUFF: 108 INHALANT RESPIRATORY (INHALATION) at 01:09

## 2021-09-18 RX ADMIN — DORZOLAMIDE HYDROCHLORIDE 1 DROP: 20 SOLUTION/ DROPS OPHTHALMIC at 08:09

## 2021-09-18 RX ADMIN — DORZOLAMIDE HYDROCHLORIDE 1 DROP: 20 SOLUTION/ DROPS OPHTHALMIC at 03:09

## 2021-09-18 RX ADMIN — MULTIPLE VITAMINS W/ MINERALS TAB 1 TABLET: TAB at 08:09

## 2021-09-18 RX ADMIN — ENOXAPARIN SODIUM 70 MG: 80 INJECTION SUBCUTANEOUS at 08:09

## 2021-09-18 RX ADMIN — REMDESIVIR 100 MG: 100 INJECTION, POWDER, LYOPHILIZED, FOR SOLUTION INTRAVENOUS at 08:09

## 2021-09-20 LAB
BACTERIA BLD CULT: NORMAL
BACTERIA BLD CULT: NORMAL

## 2021-09-22 ENCOUNTER — TELEPHONE (OUTPATIENT)
Dept: INTERNAL MEDICINE | Facility: CLINIC | Age: 86
End: 2021-09-22

## 2021-09-24 ENCOUNTER — LAB VISIT (OUTPATIENT)
Dept: LAB | Facility: HOSPITAL | Age: 86
End: 2021-09-24
Attending: INTERNAL MEDICINE
Payer: MEDICARE

## 2021-09-24 DIAGNOSIS — U07.1 CLINICAL DIAGNOSIS OF SEVERE ACUTE RESPIRATORY SYNDROME CORONAVIRUS 2 (SARS-COV-2) DISEASE: Primary | ICD-10-CM

## 2021-09-24 LAB
ANION GAP SERPL CALC-SCNC: 8 MMOL/L (ref 8–16)
BASOPHILS # BLD AUTO: 0.02 K/UL (ref 0–0.2)
BASOPHILS NFR BLD: 0.5 % (ref 0–1.9)
BUN SERPL-MCNC: 18 MG/DL (ref 8–23)
CALCIUM SERPL-MCNC: 9.3 MG/DL (ref 8.7–10.5)
CHLORIDE SERPL-SCNC: 105 MMOL/L (ref 95–110)
CO2 SERPL-SCNC: 29 MMOL/L (ref 23–29)
CREAT SERPL-MCNC: 0.8 MG/DL (ref 0.5–1.4)
DIFFERENTIAL METHOD: ABNORMAL
EOSINOPHIL # BLD AUTO: 0.2 K/UL (ref 0–0.5)
EOSINOPHIL NFR BLD: 5.8 % (ref 0–8)
ERYTHROCYTE [DISTWIDTH] IN BLOOD BY AUTOMATED COUNT: 14.2 % (ref 11.5–14.5)
EST. GFR  (AFRICAN AMERICAN): >60 ML/MIN/1.73 M^2
EST. GFR  (NON AFRICAN AMERICAN): >60 ML/MIN/1.73 M^2
GLUCOSE SERPL-MCNC: 106 MG/DL (ref 70–110)
HCT VFR BLD AUTO: 34.4 % (ref 37–48.5)
HGB BLD-MCNC: 10.9 G/DL (ref 12–16)
IMM GRANULOCYTES # BLD AUTO: 0.01 K/UL (ref 0–0.04)
IMM GRANULOCYTES NFR BLD AUTO: 0.3 % (ref 0–0.5)
LYMPHOCYTES # BLD AUTO: 0.8 K/UL (ref 1–4.8)
LYMPHOCYTES NFR BLD: 18.8 % (ref 18–48)
MCH RBC QN AUTO: 29.3 PG (ref 27–31)
MCHC RBC AUTO-ENTMCNC: 31.7 G/DL (ref 32–36)
MCV RBC AUTO: 93 FL (ref 82–98)
MONOCYTES # BLD AUTO: 0.4 K/UL (ref 0.3–1)
MONOCYTES NFR BLD: 11 % (ref 4–15)
NEUTROPHILS # BLD AUTO: 2.5 K/UL (ref 1.8–7.7)
NEUTROPHILS NFR BLD: 63.6 % (ref 38–73)
NRBC BLD-RTO: 0 /100 WBC
PLATELET # BLD AUTO: 269 K/UL (ref 150–450)
PMV BLD AUTO: 10.5 FL (ref 9.2–12.9)
POTASSIUM SERPL-SCNC: 3.9 MMOL/L (ref 3.5–5.1)
RBC # BLD AUTO: 3.72 M/UL (ref 4–5.4)
SODIUM SERPL-SCNC: 142 MMOL/L (ref 136–145)
WBC # BLD AUTO: 3.99 K/UL (ref 3.9–12.7)

## 2021-09-24 PROCEDURE — 85025 COMPLETE CBC W/AUTO DIFF WBC: CPT | Performed by: INTERNAL MEDICINE

## 2021-09-24 PROCEDURE — 80048 BASIC METABOLIC PNL TOTAL CA: CPT | Performed by: INTERNAL MEDICINE

## 2021-09-27 ENCOUNTER — OFFICE VISIT (OUTPATIENT)
Dept: INTERNAL MEDICINE | Facility: CLINIC | Age: 86
End: 2021-09-27
Payer: MEDICARE

## 2021-09-27 ENCOUNTER — TELEPHONE (OUTPATIENT)
Dept: INTERNAL MEDICINE | Facility: CLINIC | Age: 86
End: 2021-09-27

## 2021-09-27 VITALS
OXYGEN SATURATION: 96 % | HEART RATE: 68 BPM | DIASTOLIC BLOOD PRESSURE: 70 MMHG | SYSTOLIC BLOOD PRESSURE: 120 MMHG | TEMPERATURE: 99 F

## 2021-09-27 DIAGNOSIS — U07.1 COVID-19: Primary | ICD-10-CM

## 2021-09-27 DIAGNOSIS — J44.9 CHRONIC OBSTRUCTIVE PULMONARY DISEASE, UNSPECIFIED COPD TYPE: ICD-10-CM

## 2021-09-27 DIAGNOSIS — U07.1 COVID TOES: ICD-10-CM

## 2021-09-27 DIAGNOSIS — R23.8 COVID TOES: ICD-10-CM

## 2021-09-27 DIAGNOSIS — I10 ESSENTIAL HYPERTENSION: ICD-10-CM

## 2021-09-27 DIAGNOSIS — E03.9 HYPOTHYROIDISM, UNSPECIFIED TYPE: ICD-10-CM

## 2021-09-27 PROCEDURE — 99214 PR OFFICE/OUTPT VISIT, EST, LEVL IV, 30-39 MIN: ICD-10-PCS | Mod: CR,95,, | Performed by: INTERNAL MEDICINE

## 2021-09-27 PROCEDURE — 99214 OFFICE O/P EST MOD 30 MIN: CPT | Mod: CR,95,, | Performed by: INTERNAL MEDICINE

## 2021-09-29 ENCOUNTER — PES CALL (OUTPATIENT)
Dept: ADMINISTRATIVE | Facility: CLINIC | Age: 86
End: 2021-09-29

## 2021-09-30 ENCOUNTER — EXTERNAL CHRONIC CARE MANAGEMENT (OUTPATIENT)
Dept: PRIMARY CARE CLINIC | Facility: CLINIC | Age: 86
End: 2021-09-30
Payer: MEDICARE

## 2021-09-30 PROCEDURE — 99490 PR CHRONIC CARE MGMT, 1ST 20 MIN: ICD-10-PCS | Mod: S$PBB,,, | Performed by: INTERNAL MEDICINE

## 2021-09-30 PROCEDURE — 99490 CHRNC CARE MGMT STAFF 1ST 20: CPT | Mod: S$PBB,,, | Performed by: INTERNAL MEDICINE

## 2021-09-30 PROCEDURE — 99490 CHRNC CARE MGMT STAFF 1ST 20: CPT | Mod: PBBFAC | Performed by: INTERNAL MEDICINE

## 2021-10-01 ENCOUNTER — EXTERNAL HOME HEALTH (OUTPATIENT)
Dept: HOME HEALTH SERVICES | Facility: HOSPITAL | Age: 86
End: 2021-10-01

## 2021-10-31 ENCOUNTER — EXTERNAL CHRONIC CARE MANAGEMENT (OUTPATIENT)
Dept: PRIMARY CARE CLINIC | Facility: CLINIC | Age: 86
End: 2021-10-31
Payer: MEDICARE

## 2021-10-31 PROCEDURE — 99490 PR CHRONIC CARE MGMT, 1ST 20 MIN: ICD-10-PCS | Mod: S$PBB,,, | Performed by: INTERNAL MEDICINE

## 2021-10-31 PROCEDURE — 99439 CHRNC CARE MGMT STAF EA ADDL: CPT | Mod: PBBFAC,25,27 | Performed by: INTERNAL MEDICINE

## 2021-10-31 PROCEDURE — 99490 CHRNC CARE MGMT STAFF 1ST 20: CPT | Mod: S$PBB,,, | Performed by: INTERNAL MEDICINE

## 2021-10-31 PROCEDURE — 99439 CHRNC CARE MGMT STAF EA ADDL: CPT | Mod: S$PBB,,, | Performed by: INTERNAL MEDICINE

## 2021-10-31 PROCEDURE — 99439 PR CHRONIC CARE MGMT, EA ADDTL 20 MIN: ICD-10-PCS | Mod: S$PBB,,, | Performed by: INTERNAL MEDICINE

## 2021-10-31 PROCEDURE — 99490 CHRNC CARE MGMT STAFF 1ST 20: CPT | Mod: PBBFAC | Performed by: INTERNAL MEDICINE

## 2021-11-23 ENCOUNTER — HOSPITAL ENCOUNTER (EMERGENCY)
Facility: HOSPITAL | Age: 86
Discharge: HOME | End: 2021-11-23
Attending: EMERGENCY MEDICINE
Payer: MEDICARE

## 2021-11-23 VITALS
DIASTOLIC BLOOD PRESSURE: 82 MMHG | OXYGEN SATURATION: 93 % | HEART RATE: 79 BPM | TEMPERATURE: 98 F | BODY MASS INDEX: 24.03 KG/M2 | SYSTOLIC BLOOD PRESSURE: 183 MMHG | WEIGHT: 140 LBS | RESPIRATION RATE: 18 BRPM

## 2021-11-23 DIAGNOSIS — M79.89 LEG SWELLING: Primary | ICD-10-CM

## 2021-11-23 DIAGNOSIS — R10.9 FLANK PAIN: ICD-10-CM

## 2021-11-23 LAB
ALBUMIN SERPL BCP-MCNC: 3.3 G/DL (ref 3.5–5.2)
ALP SERPL-CCNC: 65 U/L (ref 55–135)
ALT SERPL W/O P-5'-P-CCNC: 5 U/L (ref 10–44)
ANION GAP SERPL CALC-SCNC: 14 MMOL/L (ref 8–16)
AST SERPL-CCNC: 20 U/L (ref 10–40)
BACTERIA #/AREA URNS AUTO: NORMAL /HPF
BASOPHILS # BLD AUTO: 0.03 K/UL (ref 0–0.2)
BASOPHILS NFR BLD: 0.8 % (ref 0–1.9)
BILIRUB SERPL-MCNC: 1.7 MG/DL (ref 0.1–1)
BILIRUB UR QL STRIP: NEGATIVE
BNP SERPL-MCNC: 78 PG/ML (ref 0–99)
BUN SERPL-MCNC: 10 MG/DL (ref 8–23)
CALCIUM SERPL-MCNC: 10 MG/DL (ref 8.7–10.5)
CHLORIDE SERPL-SCNC: 100 MMOL/L (ref 95–110)
CLARITY UR REFRACT.AUTO: CLEAR
CO2 SERPL-SCNC: 26 MMOL/L (ref 23–29)
COLOR UR AUTO: YELLOW
CREAT SERPL-MCNC: 0.8 MG/DL (ref 0.5–1.4)
DIFFERENTIAL METHOD: ABNORMAL
EOSINOPHIL # BLD AUTO: 0.1 K/UL (ref 0–0.5)
EOSINOPHIL NFR BLD: 3.4 % (ref 0–8)
ERYTHROCYTE [DISTWIDTH] IN BLOOD BY AUTOMATED COUNT: 13.6 % (ref 11.5–14.5)
EST. GFR  (AFRICAN AMERICAN): >60 ML/MIN/1.73 M^2
EST. GFR  (NON AFRICAN AMERICAN): >60 ML/MIN/1.73 M^2
GLUCOSE SERPL-MCNC: 84 MG/DL (ref 70–110)
GLUCOSE UR QL STRIP: NEGATIVE
HCT VFR BLD AUTO: 40.6 % (ref 37–48.5)
HGB BLD-MCNC: 12.7 G/DL (ref 12–16)
HGB UR QL STRIP: NEGATIVE
HYALINE CASTS UR QL AUTO: 1 /LPF
IMM GRANULOCYTES # BLD AUTO: 0.01 K/UL (ref 0–0.04)
IMM GRANULOCYTES NFR BLD AUTO: 0.3 % (ref 0–0.5)
KETONES UR QL STRIP: ABNORMAL
LEUKOCYTE ESTERASE UR QL STRIP: NEGATIVE
LIPASE SERPL-CCNC: 10 U/L (ref 4–60)
LYMPHOCYTES # BLD AUTO: 1.2 K/UL (ref 1–4.8)
LYMPHOCYTES NFR BLD: 31.2 % (ref 18–48)
MCH RBC QN AUTO: 28.5 PG (ref 27–31)
MCHC RBC AUTO-ENTMCNC: 31.3 G/DL (ref 32–36)
MCV RBC AUTO: 91 FL (ref 82–98)
MICROSCOPIC COMMENT: NORMAL
MONOCYTES # BLD AUTO: 0.3 K/UL (ref 0.3–1)
MONOCYTES NFR BLD: 8 % (ref 4–15)
NEUTROPHILS # BLD AUTO: 2.2 K/UL (ref 1.8–7.7)
NEUTROPHILS NFR BLD: 56.3 % (ref 38–73)
NITRITE UR QL STRIP: NEGATIVE
NRBC BLD-RTO: 0 /100 WBC
PH UR STRIP: 7 [PH] (ref 5–8)
PLATELET # BLD AUTO: 241 K/UL (ref 150–450)
PMV BLD AUTO: 10.7 FL (ref 9.2–12.9)
POTASSIUM SERPL-SCNC: 4.1 MMOL/L (ref 3.5–5.1)
PROT SERPL-MCNC: 7.4 G/DL (ref 6–8.4)
PROT UR QL STRIP: ABNORMAL
RBC # BLD AUTO: 4.46 M/UL (ref 4–5.4)
RBC #/AREA URNS AUTO: 0 /HPF (ref 0–4)
SODIUM SERPL-SCNC: 140 MMOL/L (ref 136–145)
SP GR UR STRIP: 1.01 (ref 1–1.03)
SQUAMOUS #/AREA URNS AUTO: 1 /HPF
TROPONIN I SERPL DL<=0.01 NG/ML-MCNC: <0.006 NG/ML (ref 0–0.03)
URN SPEC COLLECT METH UR: ABNORMAL
WBC # BLD AUTO: 3.88 K/UL (ref 3.9–12.7)
WBC #/AREA URNS AUTO: 0 /HPF (ref 0–5)

## 2021-11-23 PROCEDURE — 93010 EKG 12-LEAD: ICD-10-PCS | Mod: ,,, | Performed by: INTERNAL MEDICINE

## 2021-11-23 PROCEDURE — 83690 ASSAY OF LIPASE: CPT | Performed by: STUDENT IN AN ORGANIZED HEALTH CARE EDUCATION/TRAINING PROGRAM

## 2021-11-23 PROCEDURE — 93005 ELECTROCARDIOGRAM TRACING: CPT

## 2021-11-23 PROCEDURE — 84484 ASSAY OF TROPONIN QUANT: CPT | Performed by: STUDENT IN AN ORGANIZED HEALTH CARE EDUCATION/TRAINING PROGRAM

## 2021-11-23 PROCEDURE — 99284 EMERGENCY DEPT VISIT MOD MDM: CPT | Mod: ,,, | Performed by: EMERGENCY MEDICINE

## 2021-11-23 PROCEDURE — 99284 PR EMERGENCY DEPT VISIT,LEVEL IV: ICD-10-PCS | Mod: ,,, | Performed by: EMERGENCY MEDICINE

## 2021-11-23 PROCEDURE — 99285 EMERGENCY DEPT VISIT HI MDM: CPT | Mod: 25

## 2021-11-23 PROCEDURE — 85025 COMPLETE CBC W/AUTO DIFF WBC: CPT | Performed by: STUDENT IN AN ORGANIZED HEALTH CARE EDUCATION/TRAINING PROGRAM

## 2021-11-23 PROCEDURE — 93010 ELECTROCARDIOGRAM REPORT: CPT | Mod: ,,, | Performed by: INTERNAL MEDICINE

## 2021-11-23 PROCEDURE — 83880 ASSAY OF NATRIURETIC PEPTIDE: CPT | Performed by: STUDENT IN AN ORGANIZED HEALTH CARE EDUCATION/TRAINING PROGRAM

## 2021-11-23 PROCEDURE — 80053 COMPREHEN METABOLIC PANEL: CPT | Performed by: STUDENT IN AN ORGANIZED HEALTH CARE EDUCATION/TRAINING PROGRAM

## 2021-11-23 PROCEDURE — 81001 URINALYSIS AUTO W/SCOPE: CPT | Performed by: STUDENT IN AN ORGANIZED HEALTH CARE EDUCATION/TRAINING PROGRAM

## 2021-11-23 PROCEDURE — 25000003 PHARM REV CODE 250: Performed by: STUDENT IN AN ORGANIZED HEALTH CARE EDUCATION/TRAINING PROGRAM

## 2021-11-23 RX ORDER — AMLODIPINE BESYLATE 2.5 MG/1
2.5 TABLET ORAL
Status: COMPLETED | OUTPATIENT
Start: 2021-11-23 | End: 2021-11-23

## 2021-11-23 RX ADMIN — AMLODIPINE BESYLATE 2.5 MG: 2.5 TABLET ORAL at 03:11

## 2021-11-26 ENCOUNTER — OFFICE VISIT (OUTPATIENT)
Dept: INTERNAL MEDICINE | Facility: CLINIC | Age: 86
End: 2021-11-26
Payer: MEDICARE

## 2021-11-26 ENCOUNTER — IMMUNIZATION (OUTPATIENT)
Dept: INTERNAL MEDICINE | Facility: CLINIC | Age: 86
End: 2021-11-26
Payer: MEDICARE

## 2021-11-26 VITALS
HEIGHT: 64 IN | HEART RATE: 83 BPM | SYSTOLIC BLOOD PRESSURE: 130 MMHG | WEIGHT: 132.25 LBS | DIASTOLIC BLOOD PRESSURE: 86 MMHG | OXYGEN SATURATION: 92 % | BODY MASS INDEX: 22.58 KG/M2

## 2021-11-26 DIAGNOSIS — J44.9 CHRONIC OBSTRUCTIVE PULMONARY DISEASE, UNSPECIFIED COPD TYPE: ICD-10-CM

## 2021-11-26 DIAGNOSIS — I10 PRIMARY HYPERTENSION: ICD-10-CM

## 2021-11-26 DIAGNOSIS — E78.5 HYPERLIPIDEMIA, UNSPECIFIED HYPERLIPIDEMIA TYPE: ICD-10-CM

## 2021-11-26 DIAGNOSIS — E03.9 HYPOTHYROIDISM, UNSPECIFIED TYPE: Primary | ICD-10-CM

## 2021-11-26 PROCEDURE — 99999 PR PBB SHADOW E&M-EST. PATIENT-LVL III: ICD-10-PCS | Mod: PBBFAC,,, | Performed by: INTERNAL MEDICINE

## 2021-11-26 PROCEDURE — 99214 PR OFFICE/OUTPT VISIT, EST, LEVL IV, 30-39 MIN: ICD-10-PCS | Mod: S$PBB,,, | Performed by: INTERNAL MEDICINE

## 2021-11-26 PROCEDURE — 99213 OFFICE O/P EST LOW 20 MIN: CPT | Mod: PBBFAC | Performed by: INTERNAL MEDICINE

## 2021-11-26 PROCEDURE — G0008 ADMIN INFLUENZA VIRUS VAC: HCPCS | Mod: PBBFAC

## 2021-11-26 PROCEDURE — 99999 PR PBB SHADOW E&M-EST. PATIENT-LVL III: CPT | Mod: PBBFAC,,, | Performed by: INTERNAL MEDICINE

## 2021-11-26 PROCEDURE — 90694 VACC AIIV4 NO PRSRV 0.5ML IM: CPT | Mod: PBBFAC

## 2021-11-26 PROCEDURE — 99214 OFFICE O/P EST MOD 30 MIN: CPT | Mod: S$PBB,,, | Performed by: INTERNAL MEDICINE

## 2021-11-26 RX ORDER — HYDROCHLOROTHIAZIDE 12.5 MG/1
12.5 TABLET ORAL DAILY PRN
Qty: 30 TABLET | Refills: 3 | Status: SHIPPED | OUTPATIENT
Start: 2021-11-26 | End: 2022-03-18

## 2021-11-26 RX ORDER — LEVOTHYROXINE SODIUM 112 UG/1
112 TABLET ORAL DAILY
Qty: 90 TABLET | Refills: 1 | Status: SHIPPED | OUTPATIENT
Start: 2021-11-26 | End: 2022-06-07 | Stop reason: SDUPTHER

## 2021-11-26 RX ORDER — DONEPEZIL HYDROCHLORIDE 10 MG/1
10 TABLET, FILM COATED ORAL DAILY
Qty: 90 TABLET | Refills: 1 | Status: SHIPPED | OUTPATIENT
Start: 2021-11-26 | End: 2022-06-07 | Stop reason: SDUPTHER

## 2021-11-26 RX ORDER — PRAVASTATIN SODIUM 20 MG/1
20 TABLET ORAL DAILY
Qty: 90 TABLET | Refills: 1 | Status: SHIPPED | OUTPATIENT
Start: 2021-11-26 | End: 2022-06-07 | Stop reason: SDUPTHER

## 2021-11-26 RX ORDER — NAPROXEN SODIUM 220 MG/1
81 TABLET, FILM COATED ORAL DAILY
Qty: 90 TABLET | Refills: 1 | Status: SHIPPED | OUTPATIENT
Start: 2021-11-26 | End: 2022-11-26

## 2021-11-26 RX ORDER — NAPROXEN SODIUM 220 MG/1
81 TABLET, FILM COATED ORAL DAILY
Qty: 90 TABLET | Refills: 1 | Status: SHIPPED | OUTPATIENT
Start: 2021-11-26 | End: 2021-11-26 | Stop reason: SDUPTHER

## 2021-11-30 ENCOUNTER — EXTERNAL CHRONIC CARE MANAGEMENT (OUTPATIENT)
Dept: PRIMARY CARE CLINIC | Facility: CLINIC | Age: 86
End: 2021-11-30
Payer: MEDICARE

## 2021-11-30 PROCEDURE — 99490 CHRNC CARE MGMT STAFF 1ST 20: CPT | Mod: PBBFAC | Performed by: INTERNAL MEDICINE

## 2021-11-30 PROCEDURE — 99490 PR CHRONIC CARE MGMT, 1ST 20 MIN: ICD-10-PCS | Mod: S$PBB,,, | Performed by: INTERNAL MEDICINE

## 2021-11-30 PROCEDURE — 99490 CHRNC CARE MGMT STAFF 1ST 20: CPT | Mod: S$PBB,,, | Performed by: INTERNAL MEDICINE

## 2021-12-16 ENCOUNTER — TELEPHONE (OUTPATIENT)
Dept: INTERNAL MEDICINE | Facility: CLINIC | Age: 86
End: 2021-12-16
Payer: MEDICARE

## 2021-12-31 ENCOUNTER — EXTERNAL CHRONIC CARE MANAGEMENT (OUTPATIENT)
Dept: PRIMARY CARE CLINIC | Facility: CLINIC | Age: 86
End: 2021-12-31
Payer: MEDICARE

## 2021-12-31 PROCEDURE — 99490 PR CHRONIC CARE MGMT, 1ST 20 MIN: ICD-10-PCS | Mod: S$PBB,,, | Performed by: INTERNAL MEDICINE

## 2021-12-31 PROCEDURE — 99490 CHRNC CARE MGMT STAFF 1ST 20: CPT | Mod: PBBFAC | Performed by: INTERNAL MEDICINE

## 2021-12-31 PROCEDURE — 99490 CHRNC CARE MGMT STAFF 1ST 20: CPT | Mod: S$PBB,,, | Performed by: INTERNAL MEDICINE

## 2022-01-31 ENCOUNTER — EXTERNAL CHRONIC CARE MANAGEMENT (OUTPATIENT)
Dept: PRIMARY CARE CLINIC | Facility: CLINIC | Age: 87
End: 2022-01-31
Payer: MEDICARE

## 2022-01-31 PROCEDURE — 99490 CHRNC CARE MGMT STAFF 1ST 20: CPT | Mod: PBBFAC | Performed by: INTERNAL MEDICINE

## 2022-01-31 PROCEDURE — 99490 CHRNC CARE MGMT STAFF 1ST 20: CPT | Mod: S$PBB,,, | Performed by: INTERNAL MEDICINE

## 2022-01-31 PROCEDURE — 99490 PR CHRONIC CARE MGMT, 1ST 20 MIN: ICD-10-PCS | Mod: S$PBB,,, | Performed by: INTERNAL MEDICINE

## 2022-02-28 ENCOUNTER — EXTERNAL CHRONIC CARE MANAGEMENT (OUTPATIENT)
Dept: PRIMARY CARE CLINIC | Facility: CLINIC | Age: 87
End: 2022-02-28
Payer: MEDICARE

## 2022-02-28 PROCEDURE — 99490 CHRNC CARE MGMT STAFF 1ST 20: CPT | Mod: S$PBB,,, | Performed by: INTERNAL MEDICINE

## 2022-02-28 PROCEDURE — 99490 CHRNC CARE MGMT STAFF 1ST 20: CPT | Mod: PBBFAC | Performed by: INTERNAL MEDICINE

## 2022-02-28 PROCEDURE — 99490 PR CHRONIC CARE MGMT, 1ST 20 MIN: ICD-10-PCS | Mod: S$PBB,,, | Performed by: INTERNAL MEDICINE

## 2022-03-16 ENCOUNTER — TELEPHONE (OUTPATIENT)
Dept: INTERNAL MEDICINE | Facility: CLINIC | Age: 87
End: 2022-03-16
Payer: MEDICARE

## 2022-03-16 NOTE — TELEPHONE ENCOUNTER
----- Message from Rose Silva sent at 3/16/2022  9:37 AM CDT -----  Contact: Coco pts daughter [phone]  Pts daughter is calling she states her butt is always hurting from laying down and she states it is hard for her to sleep and rest. Please advise

## 2022-03-17 ENCOUNTER — TELEPHONE (OUTPATIENT)
Dept: INTERNAL MEDICINE | Facility: CLINIC | Age: 87
End: 2022-03-17
Payer: MEDICARE

## 2022-03-17 NOTE — TELEPHONE ENCOUNTER
----- Message from Johanna Beth sent at 3/17/2022  2:43 PM CDT -----  Regarding: advice  Contact: patient [phone] or [phone]  Patient would like to get medical advice.  Symptoms (please be specific):  lower back pain at the top of buttocks- patient had a fall  How long have you had these symptoms: 2 days  Would you like a call back, or a response through your [facility] portal?:   please  Pharmacy name and phone #  .  [facility] [address] [facility] [place] [facility] [address]  [address] [facility] [address]  Phone: [phone] Fax: [phone]       Comments:

## 2022-03-17 NOTE — TELEPHONE ENCOUNTER
----- Message from Douglas Salazar MA sent at 3/17/2022  1:23 PM CDT -----  Contact: Self [phone] or [phone]    ----- Message -----  From: Tammie Driscoll  Sent: 3/17/2022  12:04 PM CDT  To: Nacho GERARD Staff    Patient is returning a phone call.  Who left a message for the patient: Jason Mcdonnell LPN   Does patient know what this is regarding:    Would you like a call back, or a response through your [facility] portal?:   call back  Comments:

## 2022-03-17 NOTE — TELEPHONE ENCOUNTER
Several messages left for pt daughter. IF she calls back, please ask her what number she can be reached at or what symptoms is her mother having regarding the wound?

## 2022-03-18 RX ORDER — HYDROCHLOROTHIAZIDE 12.5 MG/1
TABLET ORAL
Qty: 90 TABLET | Refills: 2 | Status: ON HOLD | OUTPATIENT
Start: 2022-03-18 | End: 2022-08-09 | Stop reason: HOSPADM

## 2022-03-18 NOTE — TELEPHONE ENCOUNTER
Pt fell 2 days ago, pt has continued to complain about tailbone pain. Pt daughter wants to know if she should go to Urgent Care or if she should go to ER. Pt has no sore, bruise, just pain. Please advise.

## 2022-03-18 NOTE — TELEPHONE ENCOUNTER
Called [phone]  SPoke with Coco.   She fell on 3/14/22 on her buttocks (5 days ago)  She has a bruise  Biofreeze is helping the area  Giving her ibuprofen 800 mg twice daily.   She is walking fine.   Buttocks is no longer hurting.     Continue biofreeze  Stop ibuprofen.  Start on tylenol 500 mg 2 tablets three times a day.

## 2022-03-18 NOTE — TELEPHONE ENCOUNTER
Refill Authorization Note   Michelle Campo  is requesting a refill authorization.  Brief Assessment and Rationale for Refill:  Approve    -Medication-Related Problems Identified: Requires labs  Medication Therapy Plan:       Medication Reconciliation Completed: No   Comments:   --->Care Gap information included below if applicable.   Orders Placed This Encounter    hydroCHLOROthiazide (HYDRODIURIL) 12.5 MG Tab      Requested Prescriptions   Signed Prescriptions Disp Refills    hydroCHLOROthiazide (HYDRODIURIL) 12.5 MG Tab 90 tablet 2     Sig: TAKE 1 TABLET BY MOUTH ONCE DAILY AS NEEDED FOR  SWELLING       Cardiovascular: Diuretics - Thiazide Passed - 3/18/2022  2:47 PM        Passed - Patient is at least 18 years old        Passed - Last BP in normal range within 360 days     BP Readings from Last 3 Encounters:   11/26/21 130/86   11/23/21 (!) 183/82   09/27/21 120/70               Passed - Valid encounter within last 15 months     Recent Visits  Date Type Provider Dept   11/26/21 Office Visit Lien Griffith MD [facility] Internal Medicine   09/27/21 Office Visit Lien Griffith MD [facility] Internal Medicine   06/21/21 Office Visit Lien Griffith MD [facility] Internal Medicine   03/30/20 Office Visit Lien Griffith MD [facility] Internal Medicine   Showing recent visits within past 720 days and meeting all other requirements  Future Appointments  No visits were found meeting these conditions.  Showing future appointments within next 150 days and meeting all other requirements                Passed - Cr is 1.39 or below and within 360 days     Lab Results   Component Value Date    CREATININE 0.8 11/23/2021    CREATININE 0.8 09/24/2021    CREATININE 1.0 09/18/2021    POCCRE 1.0 12/30/2020              Passed - K in normal range and within 360 days     POC Potassium   Date Value Ref Range Status   12/30/2020 3.2 (L) 3.5 - 5.1 mmol/L Final     Potassium   Date Value Ref Range Status   11/23/2021 4.1 3.5 - 5.1 mmol/L Final    09/24/2021 3.9 3.5 - 5.1 mmol/L Final   09/18/2021 4.9 3.5 - 5.1 mmol/L Final              Passed - Na is between 130 and 148 and within 360 days     POC Sodium   Date Value Ref Range Status   12/30/2020 139 136 - 145 mmol/L Final     Sodium   Date Value Ref Range Status   11/23/2021 140 136 - 145 mmol/L Final   09/24/2021 142 136 - 145 mmol/L Final   09/18/2021 141 136 - 145 mmol/L Final              Passed - eGFR within 360 days     Lab Results   Component Value Date    EGFRNONAA >60.0 11/23/2021    EGFRNONAA >60.0 09/24/2021    EGFRNONAA 51.9 (A) 09/18/2021                    Appointments  past 12m or future 3m with PCP    Date Provider   Last Visit   11/26/2021 Lien Griffith MD   Next Visit   3/16/2022 Lien Griffith MD   ED visits in past 90 days: 0     Note composed:3:08 PM 03/18/2022

## 2022-03-18 NOTE — TELEPHONE ENCOUNTER
Care Due:                  Date            Visit Type   Department     Provider  --------------------------------------------------------------------------------                                EP -                              PRIMARY      [facility] INTERNAL  Last Visit: 11-      CARE ([facility])   MEDICINE       PAMELA TOM  Next Visit: None Scheduled  None         None Found                                                            Last  Test          Frequency    Reason                     Performed    Due Date  --------------------------------------------------------------------------------    Lipid Panel.  12 months..  pravastatin..............  01- 01-    Powered by [employer]. Reference number: [id number].   3/18/2022 2:36:39 PM CDT

## 2022-03-18 NOTE — TELEPHONE ENCOUNTER
Provider Staff:     Action is required for this patient.   Please see care gap opportunities below in Care Due Message.     Thanks!  [facility]     Appointments      Date Provider   Last Visit   11/26/2021 Lien Griffith MD   Next Visit   3/16/2022 Lien Griffith MD     Note composed:3:08 PM 03/18/2022

## 2022-03-31 ENCOUNTER — EXTERNAL CHRONIC CARE MANAGEMENT (OUTPATIENT)
Dept: PRIMARY CARE CLINIC | Facility: CLINIC | Age: 87
End: 2022-03-31
Payer: MEDICARE

## 2022-03-31 PROCEDURE — 99490 CHRNC CARE MGMT STAFF 1ST 20: CPT | Mod: PBBFAC | Performed by: INTERNAL MEDICINE

## 2022-03-31 PROCEDURE — 99490 PR CHRONIC CARE MGMT, 1ST 20 MIN: ICD-10-PCS | Mod: S$PBB,,, | Performed by: INTERNAL MEDICINE

## 2022-03-31 PROCEDURE — 99490 CHRNC CARE MGMT STAFF 1ST 20: CPT | Mod: S$PBB,,, | Performed by: INTERNAL MEDICINE

## 2022-04-17 ENCOUNTER — TELEPHONE (OUTPATIENT)
Dept: INTERNAL MEDICINE | Facility: CLINIC | Age: 87
End: 2022-04-17
Payer: MEDICARE

## 2022-04-18 NOTE — TELEPHONE ENCOUNTER
PLease contact daughter to see how she is doing  If patient needs to be seen for falls, then book her to see Domenica Cheek.  They are to bring all of her pill bottles to the apt

## 2022-04-18 NOTE — TELEPHONE ENCOUNTER
Nadira GERARD Staff  Phone Number: [phone]     Good afternoon!  I just spoke with Ms. Campo for her monthly health check.  She reported she has had several recent falls.  States she is using her walker, but she gets dizzy and her legs are giving out on her.  States she doesn't check BP regularly.  Advised her to check BP a few times/week and with any dizziness/falls so that you can review them at her next visit.  She states she is scheduled to see you next month.  Just wanted to make you aware.  Please let me know if I can be of any assistance!     Thank you for allowing us to be a part of her care team!   Vanesa Meeks LPN - Care Coordinator with [facility]   [phone] ext [phone]

## 2022-04-30 ENCOUNTER — EXTERNAL CHRONIC CARE MANAGEMENT (OUTPATIENT)
Dept: PRIMARY CARE CLINIC | Facility: CLINIC | Age: 87
End: 2022-04-30
Payer: MEDICARE

## 2022-04-30 PROCEDURE — 99490 CHRNC CARE MGMT STAFF 1ST 20: CPT | Mod: S$PBB,,, | Performed by: INTERNAL MEDICINE

## 2022-04-30 PROCEDURE — 99439 CHRNC CARE MGMT STAF EA ADDL: CPT | Mod: S$PBB,,, | Performed by: INTERNAL MEDICINE

## 2022-04-30 PROCEDURE — 99490 CHRNC CARE MGMT STAFF 1ST 20: CPT | Mod: PBBFAC,25 | Performed by: INTERNAL MEDICINE

## 2022-04-30 PROCEDURE — 99439 CHRNC CARE MGMT STAF EA ADDL: CPT | Mod: PBBFAC,27 | Performed by: INTERNAL MEDICINE

## 2022-04-30 PROCEDURE — 99490 PR CHRONIC CARE MGMT, 1ST 20 MIN: ICD-10-PCS | Mod: S$PBB,,, | Performed by: INTERNAL MEDICINE

## 2022-04-30 PROCEDURE — 99439 PR CHRONIC CARE MGMT, EA ADDTL 20 MIN: ICD-10-PCS | Mod: S$PBB,,, | Performed by: INTERNAL MEDICINE

## 2022-05-23 ENCOUNTER — TELEPHONE (OUTPATIENT)
Dept: INTERNAL MEDICINE | Facility: CLINIC | Age: 87
End: 2022-05-23
Payer: MEDICARE

## 2022-05-23 NOTE — TELEPHONE ENCOUNTER
Spoke to pt daughter Yelitza, She stated she is trying to get PT and a nurse aid for her mom. Pt has possible Parkinson's(shaking a lot), very weak and no appetite for about a month....

## 2022-05-23 NOTE — TELEPHONE ENCOUNTER
----- Message from Antwon Mcdonnell sent at 5/23/2022  9:30 AM CDT -----  Contact: Edith (daughter) [phone]  Pt children requesting a call in regards to pt getting a home health nurse, stated mom is weak and need assistance. Per daughter edith can contact anyone of the kids.    Yelitza (daughter)[phone]     Francesco (son)[phone]    Please call and advise

## 2022-05-25 NOTE — TELEPHONE ENCOUNTER
Please let daughter know that she needs to be seen for further evaluation before can order home health.   See if daughter can bring her on Friday 5/27/22 at 1:30 with me   If cannot bring her mother at that time, then book her with Domenica Cheek next week

## 2022-05-27 ENCOUNTER — PATIENT MESSAGE (OUTPATIENT)
Dept: ADMINISTRATIVE | Facility: HOSPITAL | Age: 87
End: 2022-05-27
Payer: MEDICARE

## 2022-05-27 ENCOUNTER — LAB VISIT (OUTPATIENT)
Dept: LAB | Facility: HOSPITAL | Age: 87
End: 2022-05-27
Attending: INTERNAL MEDICINE
Payer: MEDICARE

## 2022-05-27 ENCOUNTER — OFFICE VISIT (OUTPATIENT)
Dept: INTERNAL MEDICINE | Facility: CLINIC | Age: 87
End: 2022-05-27
Payer: MEDICARE

## 2022-05-27 VITALS
DIASTOLIC BLOOD PRESSURE: 80 MMHG | HEIGHT: 64 IN | BODY MASS INDEX: 19.78 KG/M2 | SYSTOLIC BLOOD PRESSURE: 120 MMHG | WEIGHT: 115.88 LBS | OXYGEN SATURATION: 95 % | HEART RATE: 82 BPM

## 2022-05-27 DIAGNOSIS — R79.9 ABNORMAL BLOOD CHEMISTRY: ICD-10-CM

## 2022-05-27 DIAGNOSIS — I10 PRIMARY HYPERTENSION: ICD-10-CM

## 2022-05-27 DIAGNOSIS — E53.8 VITAMIN B12 DEFICIENCY: ICD-10-CM

## 2022-05-27 DIAGNOSIS — G20.C PARKINSONISM, UNSPECIFIED PARKINSONISM TYPE: ICD-10-CM

## 2022-05-27 DIAGNOSIS — E55.9 VITAMIN D DEFICIENCY DISEASE: ICD-10-CM

## 2022-05-27 DIAGNOSIS — E78.5 HYPERLIPIDEMIA, UNSPECIFIED HYPERLIPIDEMIA TYPE: ICD-10-CM

## 2022-05-27 DIAGNOSIS — R53.1 WEAKNESS: ICD-10-CM

## 2022-05-27 DIAGNOSIS — R63.4 WEIGHT LOSS: Primary | ICD-10-CM

## 2022-05-27 DIAGNOSIS — N39.0 URINARY TRACT INFECTION WITHOUT HEMATURIA, SITE UNSPECIFIED: ICD-10-CM

## 2022-05-27 DIAGNOSIS — E13.9 DIABETES MELLITUS DUE TO ABNORMAL INSULIN: ICD-10-CM

## 2022-05-27 DIAGNOSIS — J44.9 CHRONIC OBSTRUCTIVE PULMONARY DISEASE, UNSPECIFIED COPD TYPE: ICD-10-CM

## 2022-05-27 DIAGNOSIS — R41.3 MEMORY LOSS: ICD-10-CM

## 2022-05-27 LAB
25(OH)D3+25(OH)D2 SERPL-MCNC: 41 NG/ML (ref 30–96)
ALBUMIN SERPL BCP-MCNC: 3.5 G/DL (ref 3.5–5.2)
ALP SERPL-CCNC: 62 U/L (ref 55–135)
ALT SERPL W/O P-5'-P-CCNC: 7 U/L (ref 10–44)
ANION GAP SERPL CALC-SCNC: 14 MMOL/L (ref 8–16)
AST SERPL-CCNC: 17 U/L (ref 10–40)
BACTERIA #/AREA URNS AUTO: NORMAL /HPF
BASOPHILS # BLD AUTO: 0.03 K/UL (ref 0–0.2)
BASOPHILS NFR BLD: 0.9 % (ref 0–1.9)
BILIRUB SERPL-MCNC: 1.6 MG/DL (ref 0.1–1)
BILIRUB UR QL STRIP: NEGATIVE
BUN SERPL-MCNC: 9 MG/DL (ref 8–23)
CALCIUM SERPL-MCNC: 10 MG/DL (ref 8.7–10.5)
CHLORIDE SERPL-SCNC: 99 MMOL/L (ref 95–110)
CLARITY UR REFRACT.AUTO: ABNORMAL
CO2 SERPL-SCNC: 31 MMOL/L (ref 23–29)
COLOR UR AUTO: ABNORMAL
CREAT SERPL-MCNC: 0.8 MG/DL (ref 0.5–1.4)
DIFFERENTIAL METHOD: ABNORMAL
EOSINOPHIL # BLD AUTO: 0.1 K/UL (ref 0–0.5)
EOSINOPHIL NFR BLD: 2.4 % (ref 0–8)
ERYTHROCYTE [DISTWIDTH] IN BLOOD BY AUTOMATED COUNT: 14.5 % (ref 11.5–14.5)
EST. GFR  (AFRICAN AMERICAN): >60 ML/MIN/1.73 M^2
EST. GFR  (NON AFRICAN AMERICAN): >60 ML/MIN/1.73 M^2
ESTIMATED AVG GLUCOSE: 94 MG/DL (ref 68–131)
GLUCOSE SERPL-MCNC: 100 MG/DL (ref 70–110)
GLUCOSE UR QL STRIP: NEGATIVE
HBA1C MFR BLD: 4.9 % (ref 4–5.6)
HCT VFR BLD AUTO: 43.3 % (ref 37–48.5)
HGB BLD-MCNC: 13.5 G/DL (ref 12–16)
HGB UR QL STRIP: NEGATIVE
HYALINE CASTS UR QL AUTO: 0 /LPF
IMM GRANULOCYTES # BLD AUTO: 0.01 K/UL (ref 0–0.04)
IMM GRANULOCYTES NFR BLD AUTO: 0.3 % (ref 0–0.5)
KETONES UR QL STRIP: ABNORMAL
LEUKOCYTE ESTERASE UR QL STRIP: NEGATIVE
LYMPHOCYTES # BLD AUTO: 1.3 K/UL (ref 1–4.8)
LYMPHOCYTES NFR BLD: 39.9 % (ref 18–48)
MCH RBC QN AUTO: 27.6 PG (ref 27–31)
MCHC RBC AUTO-ENTMCNC: 31.2 G/DL (ref 32–36)
MCV RBC AUTO: 89 FL (ref 82–98)
MICROSCOPIC COMMENT: NORMAL
MONOCYTES # BLD AUTO: 0.3 K/UL (ref 0.3–1)
MONOCYTES NFR BLD: 7.6 % (ref 4–15)
NEUTROPHILS # BLD AUTO: 1.6 K/UL (ref 1.8–7.7)
NEUTROPHILS NFR BLD: 48.9 % (ref 38–73)
NITRITE UR QL STRIP: NEGATIVE
NRBC BLD-RTO: 0 /100 WBC
PH UR STRIP: 5 [PH] (ref 5–8)
PLATELET # BLD AUTO: 221 K/UL (ref 150–450)
PMV BLD AUTO: 11.3 FL (ref 9.2–12.9)
POTASSIUM SERPL-SCNC: 3.8 MMOL/L (ref 3.5–5.1)
PROT SERPL-MCNC: 7.4 G/DL (ref 6–8.4)
PROT UR QL STRIP: ABNORMAL
RBC # BLD AUTO: 4.89 M/UL (ref 4–5.4)
RBC #/AREA URNS AUTO: 2 /HPF (ref 0–4)
SODIUM SERPL-SCNC: 144 MMOL/L (ref 136–145)
SP GR UR STRIP: 1.02 (ref 1–1.03)
SQUAMOUS #/AREA URNS AUTO: 6 /HPF
T4 FREE SERPL-MCNC: 1.36 NG/DL (ref 0.71–1.51)
TSH SERPL DL<=0.005 MIU/L-ACNC: 10.37 UIU/ML (ref 0.4–4)
URN SPEC COLLECT METH UR: ABNORMAL
VIT B12 SERPL-MCNC: 351 PG/ML (ref 210–950)
WBC # BLD AUTO: 3.31 K/UL (ref 3.9–12.7)
WBC #/AREA URNS AUTO: 2 /HPF (ref 0–5)

## 2022-05-27 PROCEDURE — 99214 OFFICE O/P EST MOD 30 MIN: CPT | Mod: PBBFAC | Performed by: INTERNAL MEDICINE

## 2022-05-27 PROCEDURE — 82607 VITAMIN B-12: CPT | Performed by: INTERNAL MEDICINE

## 2022-05-27 PROCEDURE — 82306 VITAMIN D 25 HYDROXY: CPT | Performed by: INTERNAL MEDICINE

## 2022-05-27 PROCEDURE — 83036 HEMOGLOBIN GLYCOSYLATED A1C: CPT | Performed by: INTERNAL MEDICINE

## 2022-05-27 PROCEDURE — 81001 URINALYSIS AUTO W/SCOPE: CPT | Performed by: INTERNAL MEDICINE

## 2022-05-27 PROCEDURE — 99999 PR PBB SHADOW E&M-EST. PATIENT-LVL IV: ICD-10-PCS | Mod: PBBFAC,,, | Performed by: INTERNAL MEDICINE

## 2022-05-27 PROCEDURE — 85025 COMPLETE CBC W/AUTO DIFF WBC: CPT | Performed by: INTERNAL MEDICINE

## 2022-05-27 PROCEDURE — 84439 ASSAY OF FREE THYROXINE: CPT | Performed by: INTERNAL MEDICINE

## 2022-05-27 PROCEDURE — 99214 OFFICE O/P EST MOD 30 MIN: CPT | Mod: S$PBB,,, | Performed by: INTERNAL MEDICINE

## 2022-05-27 PROCEDURE — 84443 ASSAY THYROID STIM HORMONE: CPT | Performed by: INTERNAL MEDICINE

## 2022-05-27 PROCEDURE — 36415 COLL VENOUS BLD VENIPUNCTURE: CPT | Performed by: INTERNAL MEDICINE

## 2022-05-27 PROCEDURE — 99214 PR OFFICE/OUTPT VISIT, EST, LEVL IV, 30-39 MIN: ICD-10-PCS | Mod: S$PBB,,, | Performed by: INTERNAL MEDICINE

## 2022-05-27 PROCEDURE — 99999 PR PBB SHADOW E&M-EST. PATIENT-LVL IV: CPT | Mod: PBBFAC,,, | Performed by: INTERNAL MEDICINE

## 2022-05-27 PROCEDURE — 80053 COMPREHEN METABOLIC PANEL: CPT | Performed by: INTERNAL MEDICINE

## 2022-05-27 RX ORDER — CHOLECALCIFEROL (VITAMIN D3) 25 MCG
1000 TABLET ORAL DAILY
COMMUNITY

## 2022-05-27 NOTE — PROGRESS NOTES
"CHIEF COMPLAINT: Follow up of multiple issues    HISTORY OF PRESENT ILLNESS: This is a 86-year-old woman who presents with her Francesco, for  follow up of multiple issues    Her son reports that she has gotten weaker over the last month. She is only walking from the bed to the chair and the chair to the bathroom. No pain. Appetite has decreased. She has lost 17 pounds in the last 6 months.  She drinks 2 Boosts per day.  She has more tremors in her right hand when she goes to do things.       Her BP medications have been held since hospitalization. She has not been taking lisinopril HCT or amlodipine.  BP at home with home health has been fine.  She continues to take pravastatin 20 mg daily, and levothyroxine 112 mcg daily,  Aricept 10 mg daily, vitamin D 1000 units daily.     PAST MEDICAL HISTORY:   1. Hypertension.   2. Hyperlipidemia.   3. Status post stroke November 2001, hospitalized at [facility], had left facial drop and left-sided weakness which have since resolved.   4. COPD.   5. Spontaneous right pneumothorax April 2007, resolved with chest tube.   6. Shingles in 2007.   7. History of elevated blood sugars.   8. Glaucoma, followed by Dr. Rees.   9. Hyperthyroidism, status post radioactive iodine 10/2/2008.   10. Reflux.   11. Vitamin B12 deficiency    PAST SURGICAL HISTORY: Hysterectomy. She had her ovaries removed.      Social history - she has 10 children - 6 girls and 4 boys.     MEDICATIONS AND ALLERGIES: Updated in epic     PHYSICAL EXAMINATION:     /80 (BP Location: Right arm, Patient Position: Sitting)   Pulse 82   Ht 5' 4" (1.626 m)   Wt 52.6 kg (115 lb 13.6 oz)   SpO2 95%   BMI 19.89 kg/m²     GENERAL: Alert, . No apparent distress. Affect within normal   limits.   CONJUNCTIVAE: Anicteric. TM clear  NECK: Supple.   RESPIRATORY: Effort normal.     CV - RRR witht out murmur or rubs  Lungs clear.   no lower exteremity edema   Intention tremor of the right hnad           ASSESSMENT AND PLAN: "   1.  Weight loss and weakness - labs today. Home health physical therapy and occupation therapy  2. Hypertension-stable off medications. Continue to monitor at home.    3. Hyperlipidemia-on pravastatin  5. Hypothyroidism-on synthroid to 112 mcg daily. TSH   6.  Memory issues -  on Aricept  8. COPD - asymptomatic  9. ELevated blood sugars/diabetes- A1C   I will see her back in 12 weeks, sooner if problems arise

## 2022-05-27 NOTE — PATIENT INSTRUCTIONS
[facility] - [phone]    Sitter services  [facility]  [phone]    [facility]  [phone]

## 2022-05-31 ENCOUNTER — EXTERNAL CHRONIC CARE MANAGEMENT (OUTPATIENT)
Dept: PRIMARY CARE CLINIC | Facility: CLINIC | Age: 87
End: 2022-05-31
Payer: MEDICARE

## 2022-05-31 ENCOUNTER — TELEPHONE (OUTPATIENT)
Dept: INTERNAL MEDICINE | Facility: CLINIC | Age: 87
End: 2022-05-31
Payer: MEDICARE

## 2022-05-31 PROCEDURE — 99490 CHRNC CARE MGMT STAFF 1ST 20: CPT | Mod: PBBFAC | Performed by: INTERNAL MEDICINE

## 2022-05-31 PROCEDURE — 99490 CHRNC CARE MGMT STAFF 1ST 20: CPT | Mod: S$PBB,,, | Performed by: INTERNAL MEDICINE

## 2022-05-31 PROCEDURE — 99490 PR CHRONIC CARE MGMT, 1ST 20 MIN: ICD-10-PCS | Mod: S$PBB,,, | Performed by: INTERNAL MEDICINE

## 2022-05-31 NOTE — TELEPHONE ENCOUNTER
----- Message from Johanna Beth sent at 5/31/2022  9:09 AM CDT -----  Regarding: advice  Contact: Megan Torre [phone]  Megan received referral but they do not service the area

## 2022-05-31 NOTE — TELEPHONE ENCOUNTER
Please let Piter know that his company, [employer]  refused to see some one in [facility] and we are referring to another home health agency

## 2022-06-01 ENCOUNTER — TELEPHONE (OUTPATIENT)
Dept: INTERNAL MEDICINE | Facility: CLINIC | Age: 87
End: 2022-06-01
Payer: MEDICARE

## 2022-06-02 NOTE — TELEPHONE ENCOUNTER
Rosmery Griffith MD  Hi,   This is Rosmery with [facility] , we are admitting the patient to our services on Friday. Thank you for the referral.

## 2022-06-03 ENCOUNTER — TELEPHONE (OUTPATIENT)
Dept: INTERNAL MEDICINE | Facility: CLINIC | Age: 87
End: 2022-06-03
Payer: MEDICARE

## 2022-06-03 PROCEDURE — G0180 PR HOME HEALTH MD CERTIFICATION: ICD-10-PCS | Mod: ,,, | Performed by: INTERNAL MEDICINE

## 2022-06-03 PROCEDURE — G0180 MD CERTIFICATION HHA PATIENT: HCPCS | Mod: ,,, | Performed by: INTERNAL MEDICINE

## 2022-06-03 RX ORDER — AMLODIPINE BESYLATE AND BENAZEPRIL HYDROCHLORIDE 2.5; 1 MG/1; MG/1
1 CAPSULE ORAL DAILY
Qty: 90 CAPSULE | Refills: 3 | Status: SHIPPED | OUTPATIENT
Start: 2022-06-03 | End: 2022-06-07 | Stop reason: SDUPTHER

## 2022-06-03 RX ORDER — AMLODIPINE BESYLATE AND BENAZEPRIL HYDROCHLORIDE 2.5; 1 MG/1; MG/1
1 CAPSULE ORAL DAILY
Qty: 90 CAPSULE | Refills: 3 | Status: SHIPPED | OUTPATIENT
Start: 2022-06-03 | End: 2022-06-03 | Stop reason: SDUPTHER

## 2022-06-03 NOTE — TELEPHONE ENCOUNTER
----- Message from Lucita Arteaga sent at 6/3/2022  1:11 PM CDT -----  Contact: Oneil[facility] [phone]  Nurse Oneil with [facility] is calling to report patient elevated bp rt arm 176/112 and lt arm 176/122.. patient is not systematic.. patient family would like her to restart her blood pressure medication, please call to advise.

## 2022-06-03 NOTE — TELEPHONE ENCOUNTER
Care Due:                  Date            Visit Type   Department     Provider  --------------------------------------------------------------------------------                                EP -                              PRIMARY      [facility] INTERNAL  Last Visit: 05-      CARE ([facility])   MEDICINE       PAMELA TOM                              EP                               PRIMARY      [facility] INTERNAL  Next Visit: 09-      CARE ([facility])   MEDICINE       PAMELA TOM                                                            Last  Test          Frequency    Reason                     Performed    Due Date  --------------------------------------------------------------------------------    Lipid Panel.  12 months..  pravastatin..............  01- 01-    [facility] Embedded Care Gaps. Reference number: [id number]. 6/03/2022   3:52:34 PM CDT

## 2022-06-03 NOTE — TELEPHONE ENCOUNTER
----- Message from Lucita Arteaga sent at 6/3/2022  3:47 PM CDT -----  Contact: [phone]  Requesting an RX refill or new RX.  Is this a refill or new RX: refill  RX name and strength :  donepeziL (ARICEPT) 10 MG tablet 90 tablet   Is this a 30 day or 90 day RX: 920  Pharmacy name and phone # :  [address]   Phone:  [phone]  Fax:  [phone]    The doctors have asked that we provide their patients with the following 2 reminders -- prescription refills can take up to 72 hours, and a friendly reminder that in the future you can use your [facility] account to request refills: Please fill today       Requesting an RX refill or new RX.  Is this a refill or new RX: refill  RX name and strength :  pravastatin (PRAVACHOL) 20 MG tablet 90 tablet   Is this a 30 day or 90 day RX: 90  Pharmacy name and phone # :  [address]   Phone:  [phone]  Fax:  [phone]    The doctors have asked that we provide their patients with the following 2 reminders -- prescription refills can take up to 72 hours, and a friendly reminder that in the future you can use your [facility] account to request refills: please refill      Requesting an RX refill or new RX.  Is this a refill or new RX: new  RX name and strength :  levothyroxine (SYNTHROID) 112 MCG tablet 90 tablet   Is this a 30 day or 90 day RX: 920  Pharmacy name and phone # :  [address]   Phone:  [phone]  Fax:  [phone]    The doctors have asked that we provide their patients with the following 2 reminders -- prescription refills can take up to 72 hours, and a friendly reminder that in the future you can use your [facility] account to request refills: please refill

## 2022-06-06 ENCOUNTER — TELEPHONE (OUTPATIENT)
Dept: INTERNAL MEDICINE | Facility: CLINIC | Age: 87
End: 2022-06-06
Payer: MEDICARE

## 2022-06-06 NOTE — TELEPHONE ENCOUNTER
----- Message from Lien Griffith MD sent at 6/5/2022 10:20 PM CDT -----  PLease notify family member  Her blood count, blood sugar, kidney function, liver function, vitamin D and B12 levels are fine  Urine is fine. No evidence of infection  Please make sure she is taking her levothyroxine (thyroid medciation) every day  SF

## 2022-06-06 NOTE — TELEPHONE ENCOUNTER
Called and spoke to pt daughter, Paris. Relayed the following message from PCP:  Her blood count, blood sugar, kidney function, liver function, vitamin D and B12 levels are fine   Urine is fine. No evidence of infection   Please make sure she is taking her levothyroxine (thyroid medciation) every day   Pt daughter verbalized understanding.

## 2022-06-07 RX ORDER — DONEPEZIL HYDROCHLORIDE 10 MG/1
10 TABLET, FILM COATED ORAL DAILY
Qty: 90 TABLET | Refills: 1 | Status: SHIPPED | OUTPATIENT
Start: 2022-06-07

## 2022-06-07 RX ORDER — AMLODIPINE BESYLATE AND BENAZEPRIL HYDROCHLORIDE 2.5; 1 MG/1; MG/1
1 CAPSULE ORAL DAILY
Qty: 90 CAPSULE | Refills: 3 | Status: SHIPPED | OUTPATIENT
Start: 2022-06-07 | End: 2023-06-07

## 2022-06-07 RX ORDER — LEVOTHYROXINE SODIUM 112 UG/1
112 TABLET ORAL DAILY
Qty: 90 TABLET | Refills: 1 | Status: SHIPPED | OUTPATIENT
Start: 2022-06-07 | End: 2022-09-05

## 2022-06-07 RX ORDER — PRAVASTATIN SODIUM 20 MG/1
20 TABLET ORAL DAILY
Qty: 90 TABLET | Refills: 1 | Status: SHIPPED | OUTPATIENT
Start: 2022-06-07

## 2022-06-07 NOTE — TELEPHONE ENCOUNTER
----- Message from Vincent Jaramillo sent at 6/7/2022  2:23 PM CDT -----  Contact: [phone]  Pt needs a call back about bp and getting her med. She has been out of her meds and bp is 164/100.  Requesting an RX refill or new RX.  Is this a refill or new RX: refill  RX name and strength (copy/paste from chart):  levothyroxine (SYNTHROID) 112 MCG tablet  Is this a 30 day or 90 day RX:   Pharmacy name and phone # (copy/paste from chart):    [facility] [address] [place] [address] [address] [facility]  [address] [place] [address]  Phone: [phone] Fax: [phone]        The doctors have asked that we provide their patients with the following 2 reminders -- prescription refills can take up to 72 hours, and a friendly reminder that in the future you can use your [facility] account to request refills: call back  Pt needs refills on all of these      levothyroxine (SYNTHROID) 112 MCG tablet  pravastatin (PRAVACHOL) 20 MG tablet  donepeziL (ARICEPT) 10 MG tablet  amlodipine-benazepril 2.5-10 mg (LOTREL) 2.5-10 mg per capsule

## 2022-06-07 NOTE — TELEPHONE ENCOUNTER
No new care gaps identified.  [facility] Embedded Care Gaps. Reference number: [id number]. 6/07/2022   2:36:04 PM CDT

## 2022-06-24 ENCOUNTER — EXTERNAL HOME HEALTH (OUTPATIENT)
Dept: HOME HEALTH SERVICES | Facility: HOSPITAL | Age: 87
End: 2022-06-24
Payer: MEDICARE

## 2022-06-30 ENCOUNTER — EXTERNAL CHRONIC CARE MANAGEMENT (OUTPATIENT)
Dept: PRIMARY CARE CLINIC | Facility: CLINIC | Age: 87
End: 2022-06-30
Payer: MEDICARE

## 2022-06-30 PROCEDURE — 99490 CHRNC CARE MGMT STAFF 1ST 20: CPT | Mod: PBBFAC | Performed by: INTERNAL MEDICINE

## 2022-06-30 PROCEDURE — 99490 CHRNC CARE MGMT STAFF 1ST 20: CPT | Mod: S$PBB,,, | Performed by: INTERNAL MEDICINE

## 2022-06-30 PROCEDURE — 99490 PR CHRONIC CARE MGMT, 1ST 20 MIN: ICD-10-PCS | Mod: S$PBB,,, | Performed by: INTERNAL MEDICINE

## 2022-07-28 ENCOUNTER — TELEPHONE (OUTPATIENT)
Dept: INTERNAL MEDICINE | Facility: CLINIC | Age: 87
End: 2022-07-28
Payer: MEDICARE

## 2022-07-28 DIAGNOSIS — J44.9 CHRONIC OBSTRUCTIVE PULMONARY DISEASE, UNSPECIFIED COPD TYPE: Primary | ICD-10-CM

## 2022-07-29 NOTE — TELEPHONE ENCOUNTER
----- Message -----   From: Kina Iverson   Sent: 7/25/2022   2:19 PM CDT   To: Nacho GERARD Staff   Subject: Patient Question                                 Good afternoon,     I spoke to the patient's daughter for the monthly wellness check. The patient would benefit from a wheelchair, per family member. Currently, they have a walker but no wheelchair for the patient to use in the home or outside the home. The phone number for the patient's home is [phone]. Please let me know if I can be of any assistance.     Sincerely,   JASON Padilla, Care Coordinator

## 2022-07-31 ENCOUNTER — EXTERNAL CHRONIC CARE MANAGEMENT (OUTPATIENT)
Dept: PRIMARY CARE CLINIC | Facility: CLINIC | Age: 87
End: 2022-07-31
Payer: MEDICARE

## 2022-07-31 PROCEDURE — 99439 PR CHRONIC CARE MGMT, EA ADDTL 20 MIN: ICD-10-PCS | Mod: S$PBB,,, | Performed by: INTERNAL MEDICINE

## 2022-07-31 PROCEDURE — 99439 CHRNC CARE MGMT STAF EA ADDL: CPT | Mod: PBBFAC,27 | Performed by: INTERNAL MEDICINE

## 2022-07-31 PROCEDURE — 99490 CHRNC CARE MGMT STAFF 1ST 20: CPT | Mod: PBBFAC | Performed by: INTERNAL MEDICINE

## 2022-07-31 PROCEDURE — 99490 CHRNC CARE MGMT STAFF 1ST 20: CPT | Mod: S$PBB,,, | Performed by: INTERNAL MEDICINE

## 2022-07-31 PROCEDURE — 99490 PR CHRONIC CARE MGMT, 1ST 20 MIN: ICD-10-PCS | Mod: S$PBB,,, | Performed by: INTERNAL MEDICINE

## 2022-07-31 PROCEDURE — 99439 CHRNC CARE MGMT STAF EA ADDL: CPT | Mod: S$PBB,,, | Performed by: INTERNAL MEDICINE

## 2022-08-02 ENCOUNTER — HOSPITAL ENCOUNTER (INPATIENT)
Facility: HOSPITAL | Age: 87
LOS: 7 days | Discharge: HOME HEALTH | DRG: 208 | End: 2022-08-09
Attending: EMERGENCY MEDICINE | Admitting: INTERNAL MEDICINE
Payer: MEDICARE

## 2022-08-02 ENCOUNTER — TELEPHONE (OUTPATIENT)
Dept: INTERNAL MEDICINE | Facility: CLINIC | Age: 87
End: 2022-08-02
Payer: MEDICARE

## 2022-08-02 DIAGNOSIS — J96.02 ACUTE HYPERCAPNIC RESPIRATORY FAILURE: ICD-10-CM

## 2022-08-02 DIAGNOSIS — R00.1 BRADYCARDIA: ICD-10-CM

## 2022-08-02 DIAGNOSIS — R55 SYNCOPE: ICD-10-CM
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
## 2022-08-02 DIAGNOSIS — R07.9 CHEST PAIN: ICD-10-CM

## 2022-08-02 DIAGNOSIS — I35.0 AORTIC STENOSIS: ICD-10-CM

## 2022-08-02 DIAGNOSIS — J44.9 CHRONIC OBSTRUCTIVE PULMONARY DISEASE, UNSPECIFIED COPD TYPE: Primary | ICD-10-CM

## 2022-08-02 DIAGNOSIS — J96.02 ACUTE RESPIRATORY FAILURE WITH HYPERCAPNIA: ICD-10-CM

## 2022-08-02 DIAGNOSIS — R62.7 FAILURE TO THRIVE IN ADULT: ICD-10-CM

## 2022-08-02 LAB
ALBUMIN SERPL BCP-MCNC: 3.5 G/DL (ref 3.5–5.2)
ALLENS TEST: ABNORMAL
ALP SERPL-CCNC: 48 U/L (ref 55–135)
ALT SERPL W/O P-5'-P-CCNC: 17 U/L (ref 10–44)
ANION GAP SERPL CALC-SCNC: 8 MMOL/L (ref 8–16)
APTT PPP: 29.1 SEC (ref 23.3–35.1)
AST SERPL-CCNC: 34 U/L (ref 10–40)
BACTERIA #/AREA URNS HPF: NEGATIVE /HPF
BASOPHILS # BLD AUTO: 0.04 K/UL (ref 0–0.2)
BASOPHILS NFR BLD: 0.9 % (ref 0–1.9)
BILIRUB SERPL-MCNC: 1.8 MG/DL (ref 0.1–1)
BILIRUB UR QL STRIP: ABNORMAL
BNP SERPL-MCNC: 1258 PG/ML (ref 0–99)
BUN SERPL-MCNC: 24 MG/DL (ref 8–23)
CALCIUM SERPL-MCNC: 9.2 MG/DL (ref 8.7–10.5)
CHLORIDE SERPL-SCNC: 95 MMOL/L (ref 95–110)
CK SERPL-CCNC: 59 U/L (ref 20–180)
CLARITY UR: CLEAR
CO2 SERPL-SCNC: 36 MMOL/L (ref 23–29)
COLOR UR: YELLOW
CREAT SERPL-MCNC: 1 MG/DL (ref 0.5–1.4)
DELSYS: ABNORMAL
DIFFERENTIAL METHOD: ABNORMAL
EOSINOPHIL # BLD AUTO: 0 K/UL (ref 0–0.5)
EOSINOPHIL NFR BLD: 0.2 % (ref 0–8)
ERYTHROCYTE [DISTWIDTH] IN BLOOD BY AUTOMATED COUNT: 15.4 % (ref 11.5–14.5)
ERYTHROCYTE [SEDIMENTATION RATE] IN BLOOD BY WESTERGREN METHOD: 20 MM/H
ERYTHROCYTE [SEDIMENTATION RATE] IN BLOOD BY WESTERGREN METHOD: 26 MM/H
EST. GFR  (NO RACE VARIABLE): 55.2 ML/MIN/1.73 M^2
FIO2: 100
FIO2: 50
FLOW: 15
GLUCOSE SERPL-MCNC: 160 MG/DL (ref 70–110)
GLUCOSE UR QL STRIP: NEGATIVE
HCO3 UR-SCNC: 34.4 MMOL/L (ref 24–28)
HCO3 UR-SCNC: 39.2 MMOL/L (ref 24–28)
HCO3 UR-SCNC: 40.9 MMOL/L (ref 24–28)
HCT VFR BLD AUTO: 49.9 % (ref 37–48.5)
HGB BLD-MCNC: 14.6 G/DL (ref 12–16)
HGB UR QL STRIP: ABNORMAL
HYALINE CASTS #/AREA URNS LPF: 17 /LPF
IMM GRANULOCYTES # BLD AUTO: 0.07 K/UL (ref 0–0.04)
IMM GRANULOCYTES NFR BLD AUTO: 1.5 % (ref 0–0.5)
INFLUENZA A, MOLECULAR: NEGATIVE
INFLUENZA B, MOLECULAR: NEGATIVE
INR PPP: 1.1
KETONES UR QL STRIP: NEGATIVE
LACTATE SERPL-SCNC: 1.1 MMOL/L (ref 0.5–1.9)
LACTATE SERPL-SCNC: 2.2 MMOL/L (ref 0.5–1.9)
LEUKOCYTE ESTERASE UR QL STRIP: NEGATIVE
LIPASE SERPL-CCNC: 31 U/L (ref 4–60)
LYMPHOCYTES # BLD AUTO: 2 K/UL (ref 1–4.8)
LYMPHOCYTES NFR BLD: 41.9 % (ref 18–48)
MAGNESIUM SERPL-MCNC: 1.9 MG/DL (ref 1.6–2.6)
MCH RBC QN AUTO: 27.7 PG (ref 27–31)
MCHC RBC AUTO-ENTMCNC: 29.3 G/DL (ref 32–36)
MCV RBC AUTO: 95 FL (ref 82–98)
MICROSCOPIC COMMENT: ABNORMAL
MIN VOL: 10.9
MIN VOL: 8.6
MODE: ABNORMAL
MONOCYTES # BLD AUTO: 0.4 K/UL (ref 0.3–1)
MONOCYTES NFR BLD: 8.5 % (ref 4–15)
NEUTROPHILS # BLD AUTO: 2.2 K/UL (ref 1.8–7.7)
NEUTROPHILS NFR BLD: 47 % (ref 38–73)
NITRITE UR QL STRIP: NEGATIVE
NRBC BLD-RTO: 1 /100 WBC
PCO2 BLDA: 104 MMHG (ref 35–45)
PCO2 BLDA: 46.7 MMHG (ref 35–45)
PCO2 BLDA: 51.1 MMHG (ref 35–45)
PEEP: 5
PEEP: 5
PH SMN: 7.2 [PH] (ref 7.35–7.45)
PH SMN: 7.43 [PH] (ref 7.35–7.45)
PH SMN: 7.53 [PH] (ref 7.35–7.45)
PH UR STRIP: 7 [PH] (ref 5–8)
PHOSPHATE SERPL-MCNC: 4.2 MG/DL (ref 2.7–4.5)
PIP: 28
PIP: 30
PLATELET # BLD AUTO: 275 K/UL (ref 150–450)
PMV BLD AUTO: 10.5 FL (ref 9.2–12.9)
PO2 BLDA: 220 MMHG (ref 80–100)
PO2 BLDA: 530 MMHG (ref 80–100)
PO2 BLDA: 91 MMHG (ref 40–60)
POC BE: 10 MMOL/L
POC BE: 13 MMOL/L
POC BE: 17 MMOL/L
POC SATURATED O2: 100 % (ref 95–100)
POC SATURATED O2: 100 % (ref 95–100)
POC SATURATED O2: 94 % (ref 95–100)
POC TCO2: 36 MMOL/L (ref 23–27)
POC TCO2: 41 MMOL/L (ref 23–27)
POC TCO2: 44 MMOL/L (ref 24–29)
POTASSIUM SERPL-SCNC: 3.4 MMOL/L (ref 3.5–5.1)
PROCALCITONIN SERPL IA-MCNC: 0.05 NG/ML (ref 0–0.5)
PROT SERPL-MCNC: 7.3 G/DL (ref 6–8.4)
PROT UR QL STRIP: ABNORMAL
PROTHROMBIN TIME: 13.7 SEC (ref 11.4–13.7)
RBC # BLD AUTO: 5.27 M/UL (ref 4–5.4)
RBC #/AREA URNS HPF: 26 /HPF (ref 0–4)
SAMPLE: ABNORMAL
SARS-COV-2 RDRP RESP QL NAA+PROBE: NEGATIVE
SITE: ABNORMAL
SODIUM SERPL-SCNC: 139 MMOL/L (ref 136–145)
SP GR UR STRIP: 1.02 (ref 1–1.03)
SP02: 100
SP02: 98
SPECIMEN SOURCE: NORMAL
SQUAMOUS #/AREA URNS HPF: 7 /HPF
TROPONIN I SERPL DL<=0.01 NG/ML-MCNC: 0.03 NG/ML
TSH SERPL DL<=0.005 MIU/L-ACNC: 3.04 UIU/ML (ref 0.34–5.6)
URN SPEC COLLECT METH UR: ABNORMAL
UROBILINOGEN UR STRIP-ACNC: ABNORMAL EU/DL
VT: 400
VT: 400
WBC # BLD AUTO: 4.68 K/UL (ref 3.9–12.7)
WBC #/AREA URNS HPF: 5 /HPF (ref 0–5)

## 2022-08-02 PROCEDURE — 99900026 HC AIRWAY MAINTENANCE (STAT)

## 2022-08-02 PROCEDURE — 83735 ASSAY OF MAGNESIUM: CPT | Performed by: EMERGENCY MEDICINE

## 2022-08-02 PROCEDURE — 31500 INSERT EMERGENCY AIRWAY: CPT

## 2022-08-02 PROCEDURE — 83880 ASSAY OF NATRIURETIC PEPTIDE: CPT | Performed by: EMERGENCY MEDICINE

## 2022-08-02 PROCEDURE — 85730 THROMBOPLASTIN TIME PARTIAL: CPT | Performed by: EMERGENCY MEDICINE

## 2022-08-02 PROCEDURE — S0030 INJECTION, METRONIDAZOLE: HCPCS | Performed by: INTERNAL MEDICINE

## 2022-08-02 PROCEDURE — 84145 PROCALCITONIN (PCT): CPT | Performed by: EMERGENCY MEDICINE

## 2022-08-02 PROCEDURE — 83605 ASSAY OF LACTIC ACID: CPT | Mod: 91 | Performed by: EMERGENCY MEDICINE

## 2022-08-02 PROCEDURE — 87040 BLOOD CULTURE FOR BACTERIA: CPT | Performed by: EMERGENCY MEDICINE

## 2022-08-02 PROCEDURE — 87502 INFLUENZA DNA AMP PROBE: CPT | Performed by: EMERGENCY MEDICINE

## 2022-08-02 PROCEDURE — 63600175 PHARM REV CODE 636 W HCPCS

## 2022-08-02 PROCEDURE — 96366 THER/PROPH/DIAG IV INF ADDON: CPT

## 2022-08-02 PROCEDURE — 84443 ASSAY THYROID STIM HORMONE: CPT | Performed by: EMERGENCY MEDICINE

## 2022-08-02 PROCEDURE — 37799 UNLISTED PX VASCULAR SURGERY: CPT

## 2022-08-02 PROCEDURE — 96375 TX/PRO/DX INJ NEW DRUG ADDON: CPT

## 2022-08-02 PROCEDURE — 83690 ASSAY OF LIPASE: CPT | Performed by: EMERGENCY MEDICINE

## 2022-08-02 PROCEDURE — 36415 COLL VENOUS BLD VENIPUNCTURE: CPT | Performed by: EMERGENCY MEDICINE

## 2022-08-02 PROCEDURE — 36620 INSERTION CATHETER ARTERY: CPT

## 2022-08-02 PROCEDURE — 36556 INSERT NON-TUNNEL CV CATH: CPT

## 2022-08-02 PROCEDURE — 94761 N-INVAS EAR/PLS OXIMETRY MLT: CPT

## 2022-08-02 PROCEDURE — 99900035 HC TECH TIME PER 15 MIN (STAT)

## 2022-08-02 PROCEDURE — 84100 ASSAY OF PHOSPHORUS: CPT | Performed by: EMERGENCY MEDICINE

## 2022-08-02 PROCEDURE — 25000003 PHARM REV CODE 250: Performed by: INTERNAL MEDICINE

## 2022-08-02 PROCEDURE — 63600175 PHARM REV CODE 636 W HCPCS: Performed by: INTERNAL MEDICINE

## 2022-08-02 PROCEDURE — 87070 CULTURE OTHR SPECIMN AEROBIC: CPT | Performed by: EMERGENCY MEDICINE

## 2022-08-02 PROCEDURE — 96365 THER/PROPH/DIAG IV INF INIT: CPT

## 2022-08-02 PROCEDURE — 25000003 PHARM REV CODE 250: Performed by: EMERGENCY MEDICINE

## 2022-08-02 PROCEDURE — 82550 ASSAY OF CK (CPK): CPT | Performed by: EMERGENCY MEDICINE

## 2022-08-02 PROCEDURE — 99900031 HC PATIENT EDUCATION (STAT)

## 2022-08-02 PROCEDURE — U0002 COVID-19 LAB TEST NON-CDC: HCPCS | Performed by: EMERGENCY MEDICINE

## 2022-08-02 PROCEDURE — 87205 SMEAR GRAM STAIN: CPT | Performed by: EMERGENCY MEDICINE

## 2022-08-02 PROCEDURE — 27000221 HC OXYGEN, UP TO 24 HOURS

## 2022-08-02 PROCEDURE — 82803 BLOOD GASES ANY COMBINATION: CPT

## 2022-08-02 PROCEDURE — 99291 CRITICAL CARE FIRST HOUR: CPT

## 2022-08-02 PROCEDURE — 63600175 PHARM REV CODE 636 W HCPCS: Performed by: EMERGENCY MEDICINE

## 2022-08-02 PROCEDURE — 81001 URINALYSIS AUTO W/SCOPE: CPT | Performed by: EMERGENCY MEDICINE

## 2022-08-02 PROCEDURE — 94799 UNLISTED PULMONARY SVC/PX: CPT

## 2022-08-02 PROCEDURE — 85610 PROTHROMBIN TIME: CPT | Performed by: EMERGENCY MEDICINE

## 2022-08-02 PROCEDURE — 25000003 PHARM REV CODE 250: Performed by: STUDENT IN AN ORGANIZED HEALTH CARE EDUCATION/TRAINING PROGRAM

## 2022-08-02 PROCEDURE — 84484 ASSAY OF TROPONIN QUANT: CPT | Performed by: EMERGENCY MEDICINE

## 2022-08-02 PROCEDURE — 85025 COMPLETE CBC W/AUTO DIFF WBC: CPT | Performed by: EMERGENCY MEDICINE

## 2022-08-02 PROCEDURE — 80053 COMPREHEN METABOLIC PANEL: CPT | Performed by: EMERGENCY MEDICINE

## 2022-08-02 PROCEDURE — 83605 ASSAY OF LACTIC ACID: CPT | Performed by: EMERGENCY MEDICINE

## 2022-08-02 PROCEDURE — 20000000 HC ICU ROOM

## 2022-08-02 PROCEDURE — 94002 VENT MGMT INPAT INIT DAY: CPT

## 2022-08-02 RX ORDER — FENTANYL CITRATE 50 UG/ML
100 INJECTION, SOLUTION INTRAMUSCULAR; INTRAVENOUS
Status: COMPLETED | OUTPATIENT
Start: 2022-08-02 | End: 2022-08-02

## 2022-08-02 RX ORDER — POLYETHYLENE GLYCOL 3350 17 G/17G
17 POWDER, FOR SOLUTION ORAL DAILY PRN
Status: DISCONTINUED | OUTPATIENT
Start: 2022-08-02 | End: 2022-08-09 | Stop reason: HOSPADM

## 2022-08-02 RX ORDER — SODIUM CHLORIDE 9 MG/ML
500 INJECTION, SOLUTION INTRAVENOUS
Status: COMPLETED | OUTPATIENT
Start: 2022-08-02 | End: 2022-08-02

## 2022-08-02 RX ORDER — SODIUM,POTASSIUM PHOSPHATES 280-250MG
2 POWDER IN PACKET (EA) ORAL
Status: DISCONTINUED | OUTPATIENT
Start: 2022-08-02 | End: 2022-08-09 | Stop reason: HOSPADM

## 2022-08-02 RX ORDER — TALC
6 POWDER (GRAM) TOPICAL NIGHTLY PRN
Status: DISCONTINUED | OUTPATIENT
Start: 2022-08-02 | End: 2022-08-09 | Stop reason: HOSPADM

## 2022-08-02 RX ORDER — ENOXAPARIN SODIUM 100 MG/ML
30 INJECTION SUBCUTANEOUS EVERY 24 HOURS
Status: DISCONTINUED | OUTPATIENT
Start: 2022-08-02 | End: 2022-08-09 | Stop reason: HOSPADM

## 2022-08-02 RX ORDER — METRONIDAZOLE 500 MG/100ML
500 INJECTION, SOLUTION INTRAVENOUS
Status: DISCONTINUED | OUTPATIENT
Start: 2022-08-02 | End: 2022-08-03

## 2022-08-02 RX ORDER — ONDANSETRON 2 MG/ML
4 INJECTION INTRAMUSCULAR; INTRAVENOUS EVERY 8 HOURS PRN
Status: DISCONTINUED | OUTPATIENT
Start: 2022-08-02 | End: 2022-08-09 | Stop reason: HOSPADM

## 2022-08-02 RX ORDER — SODIUM CHLORIDE, SODIUM LACTATE, POTASSIUM CHLORIDE, CALCIUM CHLORIDE 600; 310; 30; 20 MG/100ML; MG/100ML; MG/100ML; MG/100ML
250 INJECTION, SOLUTION INTRAVENOUS
Status: ACTIVE | OUTPATIENT
Start: 2022-08-02 | End: 2022-08-03

## 2022-08-02 RX ORDER — SUCCINYLCHOLINE CHLORIDE 20 MG/ML
100 INJECTION INTRAMUSCULAR; INTRAVENOUS
Status: COMPLETED | OUTPATIENT
Start: 2022-08-02 | End: 2022-08-02

## 2022-08-02 RX ORDER — ACETAMINOPHEN 325 MG/1
650 TABLET ORAL EVERY 8 HOURS PRN
Status: DISCONTINUED | OUTPATIENT
Start: 2022-08-02 | End: 2022-08-09 | Stop reason: HOSPADM

## 2022-08-02 RX ORDER — FENTANYL CITRATE 50 UG/ML
INJECTION, SOLUTION INTRAMUSCULAR; INTRAVENOUS
Status: COMPLETED
Start: 2022-08-02 | End: 2022-08-02

## 2022-08-02 RX ORDER — NOREPINEPHRINE BITARTRATE/D5W 4MG/250ML
0-3 PLASTIC BAG, INJECTION (ML) INTRAVENOUS CONTINUOUS
Status: DISCONTINUED | OUTPATIENT
Start: 2022-08-02 | End: 2022-08-05

## 2022-08-02 RX ORDER — LEVOTHYROXINE SODIUM 112 UG/1
112 TABLET ORAL
Status: DISCONTINUED | OUTPATIENT
Start: 2022-08-03 | End: 2022-08-09 | Stop reason: HOSPADM

## 2022-08-02 RX ORDER — NOREPINEPHRINE BITARTRATE 1 MG/ML
INJECTION, SOLUTION INTRAVENOUS
Status: DISPENSED
Start: 2022-08-02 | End: 2022-08-03

## 2022-08-02 RX ORDER — FENTANYL CITRATE-0.9 % NACL/PF 10 MCG/ML
0-250 PLASTIC BAG, INJECTION (ML) INTRAVENOUS CONTINUOUS
Status: DISCONTINUED | OUTPATIENT
Start: 2022-08-02 | End: 2022-08-05

## 2022-08-02 RX ORDER — NALOXONE HCL 0.4 MG/ML
0.02 VIAL (ML) INJECTION
Status: DISCONTINUED | OUTPATIENT
Start: 2022-08-02 | End: 2022-08-09 | Stop reason: HOSPADM

## 2022-08-02 RX ORDER — ETOMIDATE 2 MG/ML
10 INJECTION INTRAVENOUS
Status: COMPLETED | OUTPATIENT
Start: 2022-08-02 | End: 2022-08-02

## 2022-08-02 RX ORDER — LANOLIN ALCOHOL/MO/W.PET/CERES
800 CREAM (GRAM) TOPICAL
Status: DISCONTINUED | OUTPATIENT
Start: 2022-08-02 | End: 2022-08-09 | Stop reason: HOSPADM

## 2022-08-02 RX ORDER — MEROPENEM AND SODIUM CHLORIDE 1 G/50ML
1 INJECTION, SOLUTION INTRAVENOUS ONCE
Status: COMPLETED | OUTPATIENT
Start: 2022-08-02 | End: 2022-08-02

## 2022-08-02 RX ADMIN — CEFTRIAXONE 1 G: 1 INJECTION, POWDER, FOR SOLUTION INTRAMUSCULAR; INTRAVENOUS at 09:08

## 2022-08-02 RX ADMIN — MEROPENEM AND SODIUM CHLORIDE 1 G: 1 INJECTION, SOLUTION INTRAVENOUS at 02:08

## 2022-08-02 RX ADMIN — FENTANYL CITRATE 100 MCG: 50 INJECTION INTRAMUSCULAR; INTRAVENOUS at 02:08

## 2022-08-02 RX ADMIN — SODIUM CHLORIDE 1362 ML: 0.9 INJECTION, SOLUTION INTRAVENOUS at 01:08

## 2022-08-02 RX ADMIN — Medication 100 MG: at 01:08

## 2022-08-02 RX ADMIN — METRONIDAZOLE 500 MG: 5 INJECTION, SOLUTION INTRAVENOUS at 06:08

## 2022-08-02 RX ADMIN — ETOMIDATE 10 MG: 2 INJECTION INTRAVENOUS at 01:08

## 2022-08-02 RX ADMIN — FENTANYL CITRATE 100 MCG: 50 INJECTION, SOLUTION INTRAMUSCULAR; INTRAVENOUS at 01:08

## 2022-08-02 RX ADMIN — Medication 25 MCG/HR: at 02:08

## 2022-08-02 RX ADMIN — ENOXAPARIN SODIUM 30 MG: 30 INJECTION SUBCUTANEOUS at 06:08

## 2022-08-02 RX ADMIN — SODIUM CHLORIDE 500 ML: 0.9 INJECTION, SOLUTION INTRAVENOUS at 04:08

## 2022-08-02 RX ADMIN — VANCOMYCIN HYDROCHLORIDE 750 MG: 750 INJECTION, POWDER, LYOPHILIZED, FOR SOLUTION INTRAVENOUS at 04:08

## 2022-08-02 RX ADMIN — MIDAZOLAM HYDROCHLORIDE 1 MG/HR: 5 INJECTION, SOLUTION INTRAMUSCULAR; INTRAVENOUS at 02:08

## 2022-08-02 NOTE — HPI
Patient is a 87-year-old  female with prior history of CVA, essential hypertension, GERD, COPD and hyperlipidemia was brought to the ED by EMS secondary to generalized weakness and altered mental status.    She was intubated for airway protection prior to my arrival.  History is obtained from chart review and per ED providers.  She had gradually worsening weakness which progressed over the last few days.  Today while ambulating to the bathroom she became unresponsive.  She was helped to the floor by family members.  EMS was alerted who found her to be confused with initial GCS of 3.  She received naloxone with minimal improvement in her symptoms.  Upon arrival here she was found to be in profound respiratory acidosis. Received broad-spectrum antibiotics after obtaining cultures.  Repeat ABG with improvement in acidosis.

## 2022-08-02 NOTE — PLAN OF CARE
08/02/22 1315   Patient Assessment/Suction   Level of Consciousness (AVPU) unresponsive   Respiratory Effort Unlabored   Expansion/Accessory Muscles/Retractions no use of accessory muscles   All Lung Fields Breath Sounds clear;coarse   Rhythm/Pattern, Respiratory assisted mechanically   Cough Frequency infrequent   PRE-TX-O2   O2 Device (Oxygen Therapy) ventilator   $ Is the patient on Low Flow Oxygen? Yes   SpO2 100 %   Pulse Oximetry Type Continuous   $ Pulse Oximetry - Multiple Charge Pulse Oximetry - Multiple   Pulse 92   Resp 20   BP (!) 103/57   Airway Safety   Ambu bag with the patient? Yes, Adult Ambu   Is mask with the patient? Yes, Adult Mask   ETT Size 7   Vent Select   Conventional Vent Y   Intubation? Initial intubation   Ventilator Initiated Yes   $ Ventilator Initial 1   Charged w/in last 24h YES   Preset Conventional Ventilator Settings   Vent ID [id number]   Vent Type    Ventilation Type VC   Vent Mode A/C   Set Rate 20 BPM   Vt Set 400 mL   PEEP/CPAP 5 cmH20   Waveform RAMP   Peak Flow 55 L/min   Peak End Inspiratory Pressure 28 cmH20   Plateau Set/Insp. Hold (sec) 16   Patient Ventilator Parameters   Resp Rate Total 21 br/min   Peak Airway Pressure 29 cmH2O   Mean Airway Pressure 12 cmH20   Plateau Pressure 19 cmH20   Exhaled Vt 400 mL   Total Ve 9.12 mL   I:E Ratio Measured 1:8   Tubing ID (mm) 7 mm   Tube Type ET   Conventional Ventilator Alarms   Alarms On Y   Resp Rate High Alarm 50 br/min   Press High Alarm 45 cmH2O   Apnea Rate 10   Apnea Volume (mL) 0 mL   Apnea Oxygen Concentration  100   Apnea Flow Rate (L/min) 57   T Apnea 20 sec(s)   Ready to Wean/Extubation Screen   MV<16L (chart vol.) 9.12   PEEP <=8 (chart #) 5   Ready to Wean Parameters   F/VT Ratio<105 (RSBI) (!) 50   Education   $ Education Bronchodilator;15 min   Respiratory Evaluation   $ Care Plan Tech Time 15 min   $ Eval/Re-eval Charges Evaluation   Evaluation For New Orders

## 2022-08-02 NOTE — PROGRESS NOTES
Renal Dosing of Medication    An order has been entered by a pharmacist to adjust the dose and/or frequency of the medication listed below based on the patient's renal function.    Objective:  87 y.o., female, Actual Body Weight = 45.4 kg (100 lb 1.6 oz)        Assessment:  Estimated Creatinine Clearance: 29.5 mL/min (based on SCr of 1 mg/dL).      Plan:  Orders have been entered to adjust the dose and/or frequency based on the patient's weight and renal function:  enoxaparin 30 mg mg IV every q24h  hours.   As per package insert which recommends changing the enoxaparin dose from 40 mg q24h to 30 mg q24h for patients with a CrCl less than 30 min/min    Thank you for allowing us to participate in this patient's care.     Frank Dillon 8/2/2022 5:35 PM  Department of Pharmacy  Ext [phone]

## 2022-08-02 NOTE — CARE UPDATE
08/02/22 1351   Patient Assessment/Suction   Level of Consciousness (AVPU) responds to voice   Respiratory Effort Unlabored   Expansion/Accessory Muscles/Retractions no use of accessory muscles   All Lung Fields Breath Sounds diminished;equal bilaterally   Rhythm/Pattern, Respiratory assisted mechanically   PRE-TX-O2   O2 Device (Oxygen Therapy) ventilator   $ Is the patient on Low Flow Oxygen? Yes   Pulse Oximetry Type Continuous   Oximetry Probe Site Assessed   General Safety Checklist   Safety Promotion/Fall Prevention side rails raised   Airway Safety   Ambu bag with the patient? Yes, Adult Ambu   Is mask with the patient? Yes, Adult Mask   ETT at Bedside? Yes   ETT Size 7   Vent Select   Conventional Vent Y   Charged w/in last 24h NO   Labs   $ Was an ABG obtained? A Line;ISTAT - Blood gas   $ Labs Tech Time 15 min   Critical Value Communication   Date Result Received 08/02/22   Time Result Received 1352   Resulting Department of Critical Value Respiratory   Who communicated critical value from resulting department? Brea Broussard, RRT   Critical Test #1 PH   Critical Test #1 Result 7.532   Critical Test #2 PO2   Critical Test #2 Result 530   Critical Test #3  BE   Critical Test #3 Result 17   Critical Test #4 HCO3   Critical Test #4 Result 39.2   Name of Notified Physician/Designee Dr. Minor   Date Notified 08/02/22   Time Notified 1352   Read Back Verification Yes

## 2022-08-02 NOTE — ED PROVIDER NOTES
Encounter Date: 8/2/2022       History     Chief Complaint   Patient presents with    Loss of Consciousness     While walking to restroom, collapsed and became unresponsive. Has had generalized weakness today.      HPI   87-year-old female with past medical history by chart review of COPD, diabetes, hypertension, CVA with left-sided deficits presents with decreased responsiveness and fall.  Patient obtunded and unable to provide any history.  Per EMS, has had generalized malaise for the past few days and today was ambulating to the bathroom when she became unresponsive.  Upon arrival by EMS, she was noted to have a GCS of 3 with pinpoint pupils and shallow respirations.  She was given Narcan and bagged with mild improvement in her symptoms.  Upon arrival to the hospital, she was noted to have shallow respirations with otherwise stable vital signs.    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 Carias    HYSTERECTOMY      SELECTIVE LASER TRABECUPLASTY Bilateral 08/2017        TOTAL ABDOMINAL HYSTERECTOMY W/ BILATERAL SALPINGOOPHORECTOMY       Family History   Problem Relation Age of Onset    Stroke Mother     Cancer Brother         bone cancer    Heart attack Son     Alcohol abuse Brother     Aneurysm Brother     Hypertension Daughter     Glaucoma Son     Amblyopia Neg Hx     Blindness Neg Hx     Cataracts Neg  Hx     Macular degeneration Neg Hx     Retinal detachment Neg Hx     Strabismus Neg Hx      Social History     Tobacco Use    Smoking status: Former Smoker    Smokeless tobacco: Never Used    Tobacco comment: quit smoking in 2001   Substance Use Topics    Alcohol use: No    Drug use: No     Review of Systems   Unable to perform ROS: Mental status change       Physical Exam     Initial Vitals   BP Pulse Resp Temp SpO2   08/02/22 1305 08/02/22 1305 08/02/22 1305 08/02/22 1311 08/02/22 1305   (!) 182/95 91 13 97.7 °F (36.5 °C) 99 %      MAP       --                Physical Exam    Nursing note and vitals reviewed.  Constitutional: She appears well-developed.   HENT:   Head: Normocephalic and atraumatic.   Eyes: Conjunctivae and EOM are normal.   Pinpoint pupils, minimally reactive to light   Neck: Neck supple.   Normal range of motion.  Cardiovascular: Normal rate, regular rhythm and intact distal pulses. Exam reveals no gallop and no friction rub.    No murmur heard.  Pulmonary/Chest: She has no wheezes.   Shallow respirations   Abdominal: Abdomen is soft. She exhibits no distension. There is no abdominal tenderness.   Musculoskeletal:         General: Normal range of motion.      Cervical back: Normal range of motion and neck supple.     Neurological:   Limited secondary to mental status; weak hand grasp with the left side; grimaces in response to noxious stimuli    Skin: Skin is warm and dry.         ED Course   Intubation    Date/Time: 8/2/2022 1:55 PM  Location procedure was performed: [facility] EMERGENCY DEPARTMENT  Performed by: Dylan Rees MD  Authorized by: Moises Minor MD   Consent Done: Emergent Situation  Indications: respiratory failure,  airway protection and  hypercapnia  Intubation method: video-assisted  Patient status: paralyzed (RSI)  Preoxygenation: nonrebreather mask and BVM  Sedatives: etomidate  Paralytic: succinylcholine  Laryngoscope size: Glide 3  Tube size: 7.0 mm  Tube type:  cuffed  Number of attempts: 1  Cords visualized: yes  Post-procedure assessment: chest rise and CO2 detector  Breath sounds: clear and equal and absent over the epigastrium  Cuff inflated: yes  ETT to teeth: 23 cm  Tube secured with: ETT chou  Patient tolerance: Patient tolerated the procedure well with no immediate complications  Complications: No    Arterial Line    Date/Time: 8/2/2022 1:57 PM  Location procedure was performed: [facility] EMERGENCY DEPARTMENT  Performed by: Moises Minor MD  Authorized by: Moises Minor MD   Consent Done: Emergent Situation  Preparation: Patient was prepped and draped in the usual sterile fashion.  Indications: multiple ABGs, respiratory failure and hemodynamic monitoring  Location: right radial  Needle gauge: 18  Seldinger technique: Seldinger technique used  Number of attempts: 2  Post-procedure: line sutured and dressing applied  Patient tolerance: Patient tolerated the procedure well with no immediate complications    Central Line    Date/Time: 8/2/2022 1:59 PM  Performed by: Dylan Rees MD  Authorized by: Moises Minor MD     Location procedure was performed:  [facility] EMERGENCY DEPARTMENT  Consent Done ?:  Emergent Situation  Time out complete?: Verified correct patient, procedure, equipment, staff, and site/side    Indications:  Vascular access and med administration  Preparation:  Skin prepped with ChloraPrep  Skin prep agent dried: Skin prep agent completely dried prior to procedure    Sterile barriers: All five maximal sterile barriers used - gloves, gown, cap, mask and large sterile sheet    Hand hygiene: Hand hygiene performed immediately prior to central venous catheter insertion    Location:  Right internal jugular  Catheter type:  Triple lumen  Catheter size:  7 Fr  Inserted Catheter Length (cm):  20  Ultrasound guidance: Yes    Vessel Caliber:  Large   patent  Comprressibility:  Normal  Needle advanced into vessel with real time ultrasound guidance.     Guidewire confirmed in vessel.    Steril sheath on probe.    Sterile gel used.  Manometry: No    Number of attempts:  1  Securement:  Line sutured, sterile dressing applied, chlorhexidine patch and blood return through all ports  Adverse Events:  None  Critical Care    Date/Time: 8/2/2022 6:22 PM  Performed by: Dylan Rees MD  Authorized by: Joselito Bull MD   Total critical care time (exclusive of procedural time) : 50 minutes  Critical care time was exclusive of separately billable procedures and treating other patients.  Critical care was necessary to treat or prevent imminent or life-threatening deterioration of the following conditions: cardiac failure, circulatory failure and shock.        Labs Reviewed   CULTURE, BLOOD   CULTURE, BLOOD   CBC W/ AUTO DIFFERENTIAL   COMPREHENSIVE METABOLIC PANEL   LACTIC ACID, PLASMA   URINALYSIS   URINALYSIS, REFLEX TO URINE CULTURE   MAGNESIUM   PHOSPHORUS   APTT   PROTIME-INR   B-TYPE NATRIURETIC PEPTIDE   LIPASE   TROPONIN I   PROCALCITONIN   SARS-COV-2 RNA AMPLIFICATION, QUAL   INFLUENZA A AND B ANTIGEN   CK          Imaging Results          X-Ray Chest AP Portable (In process)                  Medications   sodium chloride 0.9% bolus 1,362 mL (has no administration in time range)   meropenem-0.9% sodium chloride 1 g/50 mL IVPB (has no administration in time range)   midazolam (VERSED) 1 mg/mL in dextrose 5 % 100 mL infusion (non-titrating) (has no administration in time range)   fentaNYL 2500 mcg in 0.9% sodium chloride 250 mL infusion premix (titrating) (has no administration in time range)   fentaNYL (SUBLIMAZE) 50 mcg/mL injection (100 mcg  Given 8/2/22 1348)     Medical Decision Making:   History:   I obtained history from: someone other than patient and EMS provider.  Old Medical Records: I decided to obtain old medical records.  Old Records Summarized: records from clinic visits.  Clinical Tests:   Lab Tests: Ordered and Reviewed  Medical Tests: Ordered  "and Reviewed  Sepsis Perfusion Assessment: "I attest a sepsis perfusion exam was performed within 6 hours of sepsis, severe sepsis, or septic shock presentation, following fluid resuscitation."  ED Management:  87-year-old female with past medical history as above who presents with decreased level consciousness with associated fall.  Afebrile, vital signs stable.  Physical exam with GCS 3, decreased level of responsiveness, pinpoint pupils.  Patient emergently intubated as above given inability to protect airway.  Arterial and central lines placed as above for hemodynamic monitoring.  Broad workup initiated including (patient about aches.  Blood gas demonstrates respiratory acidosis with CO2 greater than 100 with metabolic compensation.  CT head without acute pathology.  Serial blood gases demonstrate improving CO2 with concurrent improvement in mental status.  Placed on levo for MAPs of 50s and given small volume fluid bolus.  Admitted to  for further management.    Dylan Rees  [employer] PGY-4  Emergency Medicine  6:22 PM 8/2/2022     Other:   I have discussed this case with another health care provider.             ED Course as of 08/02/22 1533   Tue Aug 02, 2022   1400 EKG 12-lead  My independent interpretation EKG is normal sinus rhythm with a rate of 72; mildly prolonged QTC; no obvious ST elevations; interpretation limited secondary to poor baseline [CC]   1455 RIJ line withdrawn 5cm [CC]   1517 Spoke with ICU, pending admit [CC]      ED Course User Index  [CC] Dylan Rees MD             Clinical Impression:   Final diagnoses:  [R55] Syncope                 Dylan Rees MD  Resident  08/02/22 0997    "

## 2022-08-02 NOTE — H&P
Formerly [facility] - Emergency Dept  Hospital Medicine  History & Physical    Patient Name: Michelle Campo  MRN: [id number]  Patient Class: IP- Inpatient  Admission Date: 8/2/2022  Attending Physician: Joselito Bull MD  Primary Care Provider: Lien Griffith MD         Patient information was obtained from past medical records and ER records.     Subjective:     Principal Problem:Acute respiratory failure with hypercapnia    Chief Complaint:   Chief Complaint   Patient presents with    Loss of Consciousness     While walking to restroom, collapsed and became unresponsive. Has had generalized weakness today.         HPI: Patient is a 87-year-old  female with prior history of CVA, essential hypertension, GERD, COPD and hyperlipidemia was brought to the ED by EMS secondary to generalized weakness and altered mental status.    She was intubated for airway protection prior to my arrival.  History is obtained from chart review and per ED providers.  She had gradually worsening weakness which progressed over the last few days.  Today while ambulating to the bathroom she became unresponsive.  She was helped to the floor by family members.  EMS was alerted who found her to be confused with initial GCS of 3.  She received naloxone with minimal improvement in her symptoms.  Upon arrival here she was found to be in profound respiratory acidosis. Received broad-spectrum antibiotics after obtaining cultures.  Repeat ABG with improvement in acidosis.      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 Carias    HYSTERECTOMY      SELECTIVE LASER TRABECUPLASTY Bilateral 08/2017        TOTAL ABDOMINAL HYSTERECTOMY W/ BILATERAL SALPINGOOPHORECTOMY         Review of patient's allergies indicates:   Allergen Reactions    Pcn [penicillins] Itching     Tolerated CTX 9/15       No current facility-administered medications on file prior to encounter.     Current Outpatient Medications on File Prior to Encounter   Medication Sig    acetaminophen (TYLENOL) 325 MG tablet Take 2 tablets (650 mg total) by mouth every 4 (four) hours as needed.    amlodipine-benazepril 2.5-10 mg (LOTREL) 2.5-10 mg per capsule Take 1 capsule by mouth once daily.    aspirin 81 MG Chew Take 1 tablet (81 mg total) by mouth once daily.    brimonidine-timoloL (COMBIGAN) 0.2-0.5 % Drop Place 1 drop into both eyes 2 (two) times a day.    donepeziL (ARICEPT) 10 MG tablet Take 1 tablet (10 mg total) by mouth once daily.    dorzolamide (TRUSOPT) 2 % ophthalmic solution Place 1 drop into both eyes 3 (three) times daily.    hydroCHLOROthiazide (HYDRODIURIL) 12.5 MG Tab TAKE 1 TABLET BY MOUTH ONCE DAILY AS NEEDED FOR  SWELLING    latanoprost 0.005 % ophthalmic solution Place 1 drop into both eyes every evening.    levothyroxine (SYNTHROID) 112 MCG tablet Take 1 tablet (112 mcg total) by mouth once daily.    pravastatin (PRAVACHOL) 20 MG tablet Take 1 tablet (20 mg total) by mouth once daily.    vitamin D (VITAMIN D3) 1000 units Tab Take 1,000 Units by mouth once daily.    [DISCONTINUED] cholecalciferol, vitamin D3, 10 mcg (400 unit) Chew Take 1 tablet by mouth once daily.     Family History       Problem Relation (Age of Onset)    Alcohol abuse Brother    Aneurysm Brother    Cancer Brother    Glaucoma Son    Heart attack Son    Hypertension Daughter    Stroke Mother          Tobacco Use    Smoking status: Former Smoker    Smokeless tobacco: Never Used    Tobacco comment:  quit smoking in 2001   Substance and Sexual Activity    Alcohol use: No    Drug use: No    Sexual activity: Never     Birth control/protection: None     Review of Systems   Unable to perform ROS: Intubated   Objective:     Vital Signs (Most Recent):  Temp: 97.7 °F (36.5 °C) (08/02/22 1311)  Pulse: 100 (08/02/22 1433)  Resp: 20 (08/02/22 1433)  BP: (!) 182/95 (08/02/22 1305)  SpO2: 100 % (08/02/22 1433)   Vital Signs (24h Range):  Temp:  [97.7 °F (36.5 °C)] 97.7 °F (36.5 °C)  Pulse:  [] 100  Resp:  [13-26] 20  SpO2:  [99 %-100 %] 100 %  BP: (182)/(95) 182/95     Weight: 45.4 kg (100 lb 1.6 oz)  Body mass index is 17.18 kg/m².    Physical Exam        General: Patient intubated and sedated.  Appears frail  Head: Normocephalic and atraumatic   Eyes:  No conjunctival pallor. No scleral icterus.  Mouth:  ET and OG tubes noted  Lungs:  Mechanical breath sounds  Cor: Regular rate and rhythm. No pedal edema.  Abd: Soft. Nontender  Musculoskeletal: No arthropathy, deformity.  Muscle wasting noted  Skin: No rashes, swelling, or erythema.  Neuro:  Sedated  Ext: No cyanosis. Peripheral pulses +  Psych:  unable to assess  : Locke catheter with yellow urine    Significant Labs: All pertinent labs within the past 24 hours have been reviewed.  ABGs:   Recent Labs   Lab 08/02/22  1309 08/02/22  1351 08/02/22  1457   PH 7.203* 7.532* 7.435   PCO2 104.0* 46.7* 51.1*   HCO3 40.9* 39.2* 34.4*   POCSATURATED 94* 100 100   BE 13 17 10   PO2 91* 530* 220*     CBC:   Recent Labs   Lab 08/02/22  1305   WBC 4.68   HGB 14.6   HCT 49.9*        CMP:   Recent Labs   Lab 08/02/22  1305      K 3.4*   CL 95   CO2 36*   *   BUN 24*   CREATININE 1.0   CALCIUM 9.2   PROT 7.3   ALBUMIN 3.5   BILITOT 1.8*   ALKPHOS 48*   AST 34   ALT 17   ANIONGAP 8     Cardiac Markers:   Recent Labs   Lab 08/02/22  1305   BNP 1,258*     Troponin:   Recent Labs   Lab 08/02/22  1305   TROPONINI 0.031       Significant Imaging: I have reviewed  all pertinent imaging results/findings within the past 24 hours.    Imaging Results              X-Ray Chest AP Portable (Final result)  Result time 08/02/22 14:12:59      Final result by Ethan Minor MD (08/02/22 14:12:59)                   Narrative:    Reason: Sepsis ETT, NGT, and central line placement    FINDINGS:  Portable chest at 1403 compared with 11/23/2021 shows placement of endotracheal tube with tip seen proximal to ethan. Enteric catheter has been placed with distal aspect in stomach, and tip off inferior aspect of this view. Right IJ central venous catheter has been placed with tip in right atrium, approximately 5 cm caudal to expected location of cavoatrial junction.    Cardiomediastinal silhouette is otherwise normal.    Biapical pleural-parenchymal opacities unchanged, and scattered pulmonary interstitial opacities unchanged along with linear left midlung opacity. Pulmonary vasculature is normal. No pneumothorax.    No acute osseous abnormality.    IMPRESSION:    1. Right IJ central venous catheter tip in right atrium. 5 cm of retraction can be considered.  2. Endotracheal tube and enteric catheter as above.  3. Unchanged biapical pleural-parenchymal opacity suggesting scarring.  4. Slight prominence of interstitial markings, felt to be chronic in nature.    Electronically signed by:  Ethan Minor MD  8/2/2022 2:12 PM CDT Workstation: [id number]                                  Results for orders placed or performed in visit on 08/02/22   EKG 12-lead    Collection Time: 08/02/22  1:54 PM    Narrative    Test Reason :     Vent. Rate : 072 BPM     Atrial Rate : 072 BPM     P-R Int : 150 ms          QRS Dur : 082 ms      QT Int : 444 ms       P-R-T Axes : 086 082 -38 degrees     QTc Int : 486 ms    Sinus rhythm with occasional Premature ventricular complexes  Left atrial enlargement  Nonspecific T wave abnormality    Abnormal ECG  When compared with ECG of 23-NOV-2021 02:58,  Premature ventricular  complexes are now Present  Premature supraventricular complexes are no longer Present  Non-specific change in ST segment in Anterior leads  Nonspecific T wave abnormality now evident in Inferior leads  Confirmed by Yousif Dewitt MD ([id number]) on 8/2/2022 4:09:32 PM    Referred By: SAVANAH   SELF           Confirmed By:Yousif Dewitt MD         Assessment/Plan:     Active Hospital Problems    Diagnosis  POA    *Acute respiratory failure with hypercapnia [J96.02]  Yes    Hypertension [I10]  Yes    Hyperlipidemia [E78.5]  Yes    Hypothyroidism [E03.9]  Yes    COPD (chronic obstructive pulmonary disease) [J44.9]  Yes      Resolved Hospital Problems   No resolved problems to display.       Plan:  Acute hypercapnic respiratory failure complicated by metabolic encephalopathy   Mechanical ventilation initiated for airway protection   Sx likely due to worsening COPD. Not on any medications which can cause hypoventilation   Low suspicion for infectious process   Continue empiric antibiotics out of abundance of caution  Follow cultures and adjust accordingly  Repeat lactic acid in 4 hours   ABGs p.r.n.  Pulmonology consultation   Holding home antihypertensives given soft blood pressures     Chronic medical conditions:  Hypothyroidism: Continue levothyroxine   Hyperlipidemia: Continue home statin   Glaucoma: Continue latanoprost, dorzolamide and brimonidine -timolol    Critical care time of 45 minutes    Joselito Bull MD  Department of Hospital Medicine   [facility] - [place] - Emergency Dept

## 2022-08-02 NOTE — TELEPHONE ENCOUNTER
----- Message from Johanna Beth sent at 8/2/2022  2:03 PM CDT -----  Regarding: advice  Contact: daughter Paris [phone]  Patient has been brought to the hospital because patient passed out.  Patient is at [facility]. Please call daughter and advise.

## 2022-08-02 NOTE — SUBJECTIVE & OBJECTIVE
Past Medical History:   Diagnosis Date    COPD (chronic obstructive pulmonary disease) 3/8/2013    CVA (cerebral infarction) 3/8/2013    Diabetes mellitus due to abnormal insulin 5/27/2022    Dry eye syndrome     Glaucoma     H/O: pneumothorax 3/8/2013    Spontaneous in 2007    Hyperlipidemia 3/8/2013    Hypertension 3/8/2013    Hypothyroidism 3/8/2013    Osteopenia 3/8/2013       Past Surgical History:   Procedure Laterality Date    CATARACT EXTRACTION W/  INTRAOCULAR LENS IMPLANT Bilateral n/a    Dr. Carias    HYSTERECTOMY      SELECTIVE LASER TRABECUPLASTY Bilateral 08/2017        TOTAL ABDOMINAL HYSTERECTOMY W/ BILATERAL SALPINGOOPHORECTOMY         Review of patient's allergies indicates:   Allergen Reactions    Pcn [penicillins] Itching     Tolerated CTX 9/15       No current facility-administered medications on file prior to encounter.     Current Outpatient Medications on File Prior to Encounter   Medication Sig    acetaminophen (TYLENOL) 325 MG tablet Take 2 tablets (650 mg total) by mouth every 4 (four) hours as needed.    amlodipine-benazepril 2.5-10 mg (LOTREL) 2.5-10 mg per capsule Take 1 capsule by mouth once daily.    aspirin 81 MG Chew Take 1 tablet (81 mg total) by mouth once daily.    brimonidine-timoloL (COMBIGAN) 0.2-0.5 % Drop Place 1 drop into both eyes 2 (two) times a day.    donepeziL (ARICEPT) 10 MG tablet Take 1 tablet (10 mg total) by mouth once daily.    dorzolamide (TRUSOPT) 2 % ophthalmic solution Place 1 drop into both eyes 3 (three) times daily.    hydroCHLOROthiazide (HYDRODIURIL) 12.5 MG Tab TAKE 1 TABLET BY MOUTH ONCE DAILY AS NEEDED FOR  SWELLING    latanoprost 0.005 % ophthalmic solution Place 1 drop into both eyes every evening.    levothyroxine (SYNTHROID) 112 MCG tablet Take 1 tablet (112 mcg total) by mouth once daily.    pravastatin (PRAVACHOL) 20 MG tablet Take 1 tablet (20 mg total) by mouth once daily.    vitamin D (VITAMIN D3) 1000 units Tab Take 1,000 Units by  mouth once daily.    [DISCONTINUED] cholecalciferol, vitamin D3, 10 mcg (400 unit) Chew Take 1 tablet by mouth once daily.     Family History       Problem Relation (Age of Onset)    Alcohol abuse Brother    Aneurysm Brother    Cancer Brother    Glaucoma Son    Heart attack Son    Hypertension Daughter    Stroke Mother          Tobacco Use    Smoking status: Former Smoker    Smokeless tobacco: Never Used    Tobacco comment: quit smoking in 2001   Substance and Sexual Activity    Alcohol use: No    Drug use: No    Sexual activity: Never     Birth control/protection: None     Review of Systems   Unable to perform ROS: Intubated   Objective:     Vital Signs (Most Recent):  Temp: 97.7 °F (36.5 °C) (08/02/22 1311)  Pulse: 100 (08/02/22 1433)  Resp: 20 (08/02/22 1433)  BP: (!) 182/95 (08/02/22 1305)  SpO2: 100 % (08/02/22 1433)   Vital Signs (24h Range):  Temp:  [97.7 °F (36.5 °C)] 97.7 °F (36.5 °C)  Pulse:  [] 100  Resp:  [13-26] 20  SpO2:  [99 %-100 %] 100 %  BP: (182)/(95) 182/95     Weight: 45.4 kg (100 lb 1.6 oz)  Body mass index is 17.18 kg/m².    Physical Exam        General: Patient intubated and sedated.  Appears frail  Head: Normocephalic and atraumatic   Eyes:  No conjunctival pallor. No scleral icterus.  Mouth:  ET and OG tubes noted  Lungs:  Mechanical breath sounds  Cor: Regular rate and rhythm. No pedal edema.  Abd: Soft. Nontender  Musculoskeletal: No arthropathy, deformity.  Muscle wasting noted  Skin: No rashes, swelling, or erythema.  Neuro:  Sedated  Ext: No cyanosis. Peripheral pulses +  Psych:  unable to assess  : Locke catheter with yellow urine    Significant Labs: All pertinent labs within the past 24 hours have been reviewed.  ABGs:   Recent Labs   Lab 08/02/22  1309 08/02/22  1351 08/02/22  1457   PH 7.203* 7.532* 7.435   PCO2 104.0* 46.7* 51.1*   HCO3 40.9* 39.2* 34.4*   POCSATURATED 94* 100 100   BE 13 17 10   PO2 91* 530* 220*     CBC:   Recent Labs   Lab 08/02/22  1305   WBC 4.68    HGB 14.6   HCT 49.9*        CMP:   Recent Labs   Lab 08/02/22  1305      K 3.4*   CL 95   CO2 36*   *   BUN 24*   CREATININE 1.0   CALCIUM 9.2   PROT 7.3   ALBUMIN 3.5   BILITOT 1.8*   ALKPHOS 48*   AST 34   ALT 17   ANIONGAP 8     Cardiac Markers:   Recent Labs   Lab 08/02/22  1305   BNP 1,258*     Troponin:   Recent Labs   Lab 08/02/22  1305   TROPONINI 0.031       Significant Imaging: I have reviewed all pertinent imaging results/findings within the past 24 hours.    Imaging Results              X-Ray Chest AP Portable (Final result)  Result time 08/02/22 14:12:59      Final result by Ethan Minor MD (08/02/22 14:12:59)                   Narrative:    Reason: Sepsis ETT, NGT, and central line placement    FINDINGS:  Portable chest at 1403 compared with 11/23/2021 shows placement of endotracheal tube with tip seen proximal to ethan. Enteric catheter has been placed with distal aspect in stomach, and tip off inferior aspect of this view. Right IJ central venous catheter has been placed with tip in right atrium, approximately 5 cm caudal to expected location of cavoatrial junction.    Cardiomediastinal silhouette is otherwise normal.    Biapical pleural-parenchymal opacities unchanged, and scattered pulmonary interstitial opacities unchanged along with linear left midlung opacity. Pulmonary vasculature is normal. No pneumothorax.    No acute osseous abnormality.    IMPRESSION:    1. Right IJ central venous catheter tip in right atrium. 5 cm of retraction can be considered.  2. Endotracheal tube and enteric catheter as above.  3. Unchanged biapical pleural-parenchymal opacity suggesting scarring.  4. Slight prominence of interstitial markings, felt to be chronic in nature.    Electronically signed by:  Ethan Minor MD  8/2/2022 2:12 PM CDT Workstation: [id number]                                  Results for orders placed or performed in visit on 08/02/22   EKG 12-lead    Collection Time:  08/02/22  1:54 PM    Narrative    Test Reason :     Vent. Rate : 072 BPM     Atrial Rate : 072 BPM     P-R Int : 150 ms          QRS Dur : 082 ms      QT Int : 444 ms       P-R-T Axes : 086 082 -38 degrees     QTc Int : 486 ms    Sinus rhythm with occasional Premature ventricular complexes  Left atrial enlargement  Nonspecific T wave abnormality    Abnormal ECG  When compared with ECG of 23-NOV-2021 02:58,  Premature ventricular complexes are now Present  Premature supraventricular complexes are no longer Present  Non-specific change in ST segment in Anterior leads  Nonspecific T wave abnormality now evident in Inferior leads  Confirmed by Yousif Dewitt MD ([id number]) on 8/2/2022 4:09:32 PM    Referred By: [email or web address]   SELF           Confirmed By:Yousif Dewitt MD

## 2022-08-03 PROBLEM — R57.1 HYPOVOLEMIC SHOCK: Status: ACTIVE | Noted: 2022-08-03

## 2022-08-03 LAB
ALLENS TEST: ABNORMAL
ALLENS TEST: ABNORMAL
ANION GAP SERPL CALC-SCNC: 10 MMOL/L (ref 8–16)
BUN SERPL-MCNC: 24 MG/DL (ref 8–23)
CALCIUM SERPL-MCNC: 8 MG/DL (ref 8.7–10.5)
CHLORIDE SERPL-SCNC: 105 MMOL/L (ref 95–110)
CO2 SERPL-SCNC: 28 MMOL/L (ref 23–29)
CREAT SERPL-MCNC: 1 MG/DL (ref 0.5–1.4)
DELSYS: ABNORMAL
DELSYS: ABNORMAL
ERYTHROCYTE [DISTWIDTH] IN BLOOD BY AUTOMATED COUNT: 15.1 % (ref 11.5–14.5)
ERYTHROCYTE [SEDIMENTATION RATE] IN BLOOD BY WESTERGREN METHOD: 14 MM/H
ERYTHROCYTE [SEDIMENTATION RATE] IN BLOOD BY WESTERGREN METHOD: 20 MM/H
EST. GFR  (NO RACE VARIABLE): 55.2 ML/MIN/1.73 M^2
FIO2: 28
FIO2: 35
GLUCOSE SERPL-MCNC: 68 MG/DL (ref 70–110)
GLUCOSE SERPL-MCNC: 75 MG/DL (ref 70–110)
GLUCOSE SERPL-MCNC: 77 MG/DL (ref 70–110)
GLUCOSE SERPL-MCNC: 78 MG/DL (ref 70–110)
GLUCOSE SERPL-MCNC: 87 MG/DL (ref 70–110)
GLUCOSE SERPL-MCNC: 96 MG/DL (ref 70–110)
HCO3 UR-SCNC: 30 MMOL/L (ref 24–28)
HCO3 UR-SCNC: 32.6 MMOL/L (ref 24–28)
HCT VFR BLD AUTO: 36.7 % (ref 37–48.5)
HCT VFR BLD CALC: 36 %PCV (ref 36–54)
HCT VFR BLD CALC: 37 %PCV (ref 36–54)
HGB BLD-MCNC: 11.7 G/DL (ref 12–16)
MAGNESIUM SERPL-MCNC: 1.4 MG/DL (ref 1.6–2.6)
MAGNESIUM SERPL-MCNC: 2.7 MG/DL (ref 1.6–2.6)
MCH RBC QN AUTO: 28.1 PG (ref 27–31)
MCHC RBC AUTO-ENTMCNC: 31.9 G/DL (ref 32–36)
MCV RBC AUTO: 88 FL (ref 82–98)
MIN VOL: 5.76
MIN VOL: 8.45
MODE: ABNORMAL
MODE: ABNORMAL
PCO2 BLDA: 34.2 MMHG (ref 35–45)
PCO2 BLDA: 39.5 MMHG (ref 35–45)
PEEP: 5
PEEP: 5
PH SMN: 7.49 [PH] (ref 7.35–7.45)
PH SMN: 7.59 [PH] (ref 7.35–7.45)
PHOSPHATE SERPL-MCNC: 1.6 MG/DL (ref 2.7–4.5)
PIP: 24
PIP: 26
PLATELET # BLD AUTO: 209 K/UL (ref 150–450)
PMV BLD AUTO: 10.7 FL (ref 9.2–12.9)
PO2 BLDA: 159 MMHG (ref 80–100)
PO2 BLDA: 98 MMHG (ref 80–100)
POC BE: 11 MMOL/L
POC BE: 7 MMOL/L
POC IONIZED CALCIUM: 1.11 MMOL/L (ref 1.06–1.42)
POC IONIZED CALCIUM: 1.12 MMOL/L (ref 1.06–1.42)
POC SATURATED O2: 100 % (ref 95–100)
POC SATURATED O2: 98 % (ref 95–100)
POC TCO2: 31 MMOL/L (ref 23–27)
POC TCO2: 34 MMOL/L (ref 23–27)
POTASSIUM BLD-SCNC: 2.9 MMOL/L (ref 3.5–5.1)
POTASSIUM BLD-SCNC: 3 MMOL/L (ref 3.5–5.1)
POTASSIUM SERPL-SCNC: 3 MMOL/L (ref 3.5–5.1)
POTASSIUM SERPL-SCNC: 4.1 MMOL/L (ref 3.5–5.1)
RBC # BLD AUTO: 4.16 M/UL (ref 4–5.4)
SAMPLE: ABNORMAL
SAMPLE: ABNORMAL
SITE: ABNORMAL
SITE: ABNORMAL
SODIUM BLD-SCNC: 144 MMOL/L (ref 136–145)
SODIUM BLD-SCNC: 146 MMOL/L (ref 136–145)
SODIUM SERPL-SCNC: 143 MMOL/L (ref 136–145)
SP02: 100
SP02: 97
TROPONIN I SERPL DL<=0.01 NG/ML-MCNC: 0.03 NG/ML
VT: 411
VT: 420
WBC # BLD AUTO: 6.01 K/UL (ref 3.9–12.7)

## 2022-08-03 PROCEDURE — 94799 UNLISTED PULMONARY SVC/PX: CPT

## 2022-08-03 PROCEDURE — 25000003 PHARM REV CODE 250: Performed by: INTERNAL MEDICINE

## 2022-08-03 PROCEDURE — 84132 ASSAY OF SERUM POTASSIUM: CPT | Performed by: INTERNAL MEDICINE

## 2022-08-03 PROCEDURE — 99291 CRITICAL CARE FIRST HOUR: CPT | Mod: ,,, | Performed by: INTERNAL MEDICINE

## 2022-08-03 PROCEDURE — 84484 ASSAY OF TROPONIN QUANT: CPT | Performed by: INTERNAL MEDICINE

## 2022-08-03 PROCEDURE — 99900026 HC AIRWAY MAINTENANCE (STAT)

## 2022-08-03 PROCEDURE — 93010 EKG 12-LEAD: ICD-10-PCS | Mod: ,,, | Performed by: INTERNAL MEDICINE

## 2022-08-03 PROCEDURE — 27000221 HC OXYGEN, UP TO 24 HOURS

## 2022-08-03 PROCEDURE — 99900031 HC PATIENT EDUCATION (STAT)

## 2022-08-03 PROCEDURE — 63600175 PHARM REV CODE 636 W HCPCS: Performed by: INTERNAL MEDICINE

## 2022-08-03 PROCEDURE — 99291 PR CRITICAL CARE, E/M 30-74 MINUTES: ICD-10-PCS | Mod: ,,, | Performed by: INTERNAL MEDICINE

## 2022-08-03 PROCEDURE — 99900035 HC TECH TIME PER 15 MIN (STAT)

## 2022-08-03 PROCEDURE — 93005 ELECTROCARDIOGRAM TRACING: CPT | Performed by: INTERNAL MEDICINE

## 2022-08-03 PROCEDURE — 93010 ELECTROCARDIOGRAM REPORT: CPT | Mod: ,,, | Performed by: INTERNAL MEDICINE

## 2022-08-03 PROCEDURE — 25000003 PHARM REV CODE 250

## 2022-08-03 PROCEDURE — S0030 INJECTION, METRONIDAZOLE: HCPCS | Performed by: INTERNAL MEDICINE

## 2022-08-03 PROCEDURE — 85027 COMPLETE CBC AUTOMATED: CPT | Performed by: INTERNAL MEDICINE

## 2022-08-03 PROCEDURE — 83735 ASSAY OF MAGNESIUM: CPT | Performed by: INTERNAL MEDICINE

## 2022-08-03 PROCEDURE — 94003 VENT MGMT INPAT SUBQ DAY: CPT

## 2022-08-03 PROCEDURE — 82962 GLUCOSE BLOOD TEST: CPT

## 2022-08-03 PROCEDURE — 84100 ASSAY OF PHOSPHORUS: CPT | Performed by: INTERNAL MEDICINE

## 2022-08-03 PROCEDURE — 94640 AIRWAY INHALATION TREATMENT: CPT

## 2022-08-03 PROCEDURE — C9113 INJ PANTOPRAZOLE SODIUM, VIA: HCPCS | Performed by: INTERNAL MEDICINE

## 2022-08-03 PROCEDURE — 20000000 HC ICU ROOM

## 2022-08-03 PROCEDURE — 80048 BASIC METABOLIC PNL TOTAL CA: CPT | Performed by: INTERNAL MEDICINE

## 2022-08-03 PROCEDURE — 94761 N-INVAS EAR/PLS OXIMETRY MLT: CPT

## 2022-08-03 PROCEDURE — 25000242 PHARM REV CODE 250 ALT 637 W/ HCPCS: Performed by: INTERNAL MEDICINE

## 2022-08-03 PROCEDURE — 36415 COLL VENOUS BLD VENIPUNCTURE: CPT | Performed by: INTERNAL MEDICINE

## 2022-08-03 RX ORDER — POTASSIUM CHLORIDE 29.8 MG/ML
80 INJECTION INTRAVENOUS
Status: DISCONTINUED | OUTPATIENT
Start: 2022-08-03 | End: 2022-08-09 | Stop reason: HOSPADM

## 2022-08-03 RX ORDER — PANTOPRAZOLE SODIUM 40 MG/10ML
40 INJECTION, POWDER, LYOPHILIZED, FOR SOLUTION INTRAVENOUS DAILY
Status: DISCONTINUED | OUTPATIENT
Start: 2022-08-03 | End: 2022-08-05

## 2022-08-03 RX ORDER — CALCIUM GLUCONATE 20 MG/ML
1 INJECTION, SOLUTION INTRAVENOUS
Status: DISCONTINUED | OUTPATIENT
Start: 2022-08-03 | End: 2022-08-09 | Stop reason: HOSPADM

## 2022-08-03 RX ORDER — LATANOPROST 50 UG/ML
1 SOLUTION/ DROPS OPHTHALMIC NIGHTLY
Status: DISCONTINUED | OUTPATIENT
Start: 2022-08-03 | End: 2022-08-09 | Stop reason: HOSPADM

## 2022-08-03 RX ORDER — IPRATROPIUM BROMIDE AND ALBUTEROL SULFATE 2.5; .5 MG/3ML; MG/3ML
3 SOLUTION RESPIRATORY (INHALATION) EVERY 8 HOURS
Status: DISCONTINUED | OUTPATIENT
Start: 2022-08-03 | End: 2022-08-07

## 2022-08-03 RX ORDER — POTASSIUM CHLORIDE 14.9 MG/ML
60 INJECTION INTRAVENOUS
Status: DISCONTINUED | OUTPATIENT
Start: 2022-08-03 | End: 2022-08-09 | Stop reason: HOSPADM

## 2022-08-03 RX ORDER — MAGNESIUM SULFATE HEPTAHYDRATE 40 MG/ML
2 INJECTION, SOLUTION INTRAVENOUS
Status: DISCONTINUED | OUTPATIENT
Start: 2022-08-03 | End: 2022-08-09 | Stop reason: HOSPADM

## 2022-08-03 RX ORDER — CALCIUM GLUCONATE 20 MG/ML
1 INJECTION, SOLUTION INTRAVENOUS
Status: DISCONTINUED | OUTPATIENT
Start: 2022-08-03 | End: 2022-08-03

## 2022-08-03 RX ORDER — CALCIUM GLUCONATE 20 MG/ML
2 INJECTION, SOLUTION INTRAVENOUS
Status: DISCONTINUED | OUTPATIENT
Start: 2022-08-03 | End: 2022-08-03

## 2022-08-03 RX ORDER — SODIUM CHLORIDE, SODIUM LACTATE, POTASSIUM CHLORIDE, CALCIUM CHLORIDE 600; 310; 30; 20 MG/100ML; MG/100ML; MG/100ML; MG/100ML
INJECTION, SOLUTION INTRAVENOUS CONTINUOUS
Status: DISCONTINUED | OUTPATIENT
Start: 2022-08-03 | End: 2022-08-03

## 2022-08-03 RX ORDER — POTASSIUM CHLORIDE 29.8 MG/ML
40 INJECTION INTRAVENOUS
Status: DISCONTINUED | OUTPATIENT
Start: 2022-08-03 | End: 2022-08-09 | Stop reason: HOSPADM

## 2022-08-03 RX ORDER — CALCIUM GLUCONATE 20 MG/ML
3 INJECTION, SOLUTION INTRAVENOUS
Status: DISCONTINUED | OUTPATIENT
Start: 2022-08-03 | End: 2022-08-09 | Stop reason: HOSPADM

## 2022-08-03 RX ORDER — CALCIUM GLUCONATE 20 MG/ML
2 INJECTION, SOLUTION INTRAVENOUS
Status: DISCONTINUED | OUTPATIENT
Start: 2022-08-03 | End: 2022-08-09 | Stop reason: HOSPADM

## 2022-08-03 RX ORDER — MAGNESIUM SULFATE HEPTAHYDRATE 40 MG/ML
4 INJECTION, SOLUTION INTRAVENOUS
Status: DISCONTINUED | OUTPATIENT
Start: 2022-08-03 | End: 2022-08-09 | Stop reason: HOSPADM

## 2022-08-03 RX ORDER — POTASSIUM CHLORIDE 750 MG/1
10 CAPSULE, EXTENDED RELEASE ORAL ONCE
Status: DISCONTINUED | OUTPATIENT
Start: 2022-08-03 | End: 2022-08-06

## 2022-08-03 RX ORDER — DEXTROSE, SODIUM CHLORIDE, SODIUM LACTATE, POTASSIUM CHLORIDE, AND CALCIUM CHLORIDE 5; .6; .31; .03; .02 G/100ML; G/100ML; G/100ML; G/100ML; G/100ML
INJECTION, SOLUTION INTRAVENOUS CONTINUOUS
Status: DISCONTINUED | OUTPATIENT
Start: 2022-08-03 | End: 2022-08-05

## 2022-08-03 RX ORDER — MUPIROCIN 20 MG/G
OINTMENT TOPICAL 2 TIMES DAILY
Status: COMPLETED | OUTPATIENT
Start: 2022-08-03 | End: 2022-08-07

## 2022-08-03 RX ORDER — CALCIUM GLUCONATE 20 MG/ML
3 INJECTION, SOLUTION INTRAVENOUS
Status: DISCONTINUED | OUTPATIENT
Start: 2022-08-03 | End: 2022-08-03

## 2022-08-03 RX ORDER — PANTOPRAZOLE SODIUM 40 MG/10ML
40 INJECTION, POWDER, LYOPHILIZED, FOR SOLUTION INTRAVENOUS DAILY
Status: DISCONTINUED | OUTPATIENT
Start: 2022-08-03 | End: 2022-08-03

## 2022-08-03 RX ORDER — DORZOLAMIDE HCL 20 MG/ML
1 SOLUTION/ DROPS OPHTHALMIC 3 TIMES DAILY
Status: DISCONTINUED | OUTPATIENT
Start: 2022-08-03 | End: 2022-08-09 | Stop reason: HOSPADM

## 2022-08-03 RX ORDER — CHLORHEXIDINE GLUCONATE ORAL RINSE 1.2 MG/ML
15 SOLUTION DENTAL 2 TIMES DAILY
Status: COMPLETED | OUTPATIENT
Start: 2022-08-03 | End: 2022-08-07

## 2022-08-03 RX ADMIN — METRONIDAZOLE 500 MG: 5 INJECTION, SOLUTION INTRAVENOUS at 02:08

## 2022-08-03 RX ADMIN — CEFTRIAXONE 1 G: 1 INJECTION, POWDER, FOR SOLUTION INTRAMUSCULAR; INTRAVENOUS at 09:08

## 2022-08-03 RX ADMIN — DEXTROSE, SODIUM CHLORIDE, SODIUM LACTATE, POTASSIUM CHLORIDE, AND CALCIUM CHLORIDE: 5; .6; .31; .03; .02 INJECTION, SOLUTION INTRAVENOUS at 10:08

## 2022-08-03 RX ADMIN — Medication 50 MCG/HR: at 09:08

## 2022-08-03 RX ADMIN — CALCIUM GLUCONATE 1 G: 20 INJECTION, SOLUTION INTRAVENOUS at 01:08

## 2022-08-03 RX ADMIN — MUPIROCIN 1 G: 20 OINTMENT TOPICAL at 09:08

## 2022-08-03 RX ADMIN — MAGNESIUM SULFATE 4 G: 2 INJECTION INTRAVENOUS at 06:08

## 2022-08-03 RX ADMIN — POTASSIUM CHLORIDE 60 MEQ: 14.9 INJECTION, SOLUTION INTRAVENOUS at 09:08

## 2022-08-03 RX ADMIN — DEXTROSE, SODIUM CHLORIDE, SODIUM LACTATE, POTASSIUM CHLORIDE, AND CALCIUM CHLORIDE: 5; .6; .31; .03; .02 INJECTION, SOLUTION INTRAVENOUS at 09:08

## 2022-08-03 RX ADMIN — LATANOPROST 1 DROP: 50 SOLUTION OPHTHALMIC at 09:08

## 2022-08-03 RX ADMIN — SODIUM PHOSPHATE, MONOBASIC, MONOHYDRATE AND SODIUM PHOSPHATE, DIBASIC, ANHYDROUS 20.01 MMOL: 276; 142 INJECTION, SOLUTION INTRAVENOUS at 09:08

## 2022-08-03 RX ADMIN — DORZOLAMIDE HYDROCHLORIDE 1 DROP: 20 SOLUTION/ DROPS OPHTHALMIC at 09:08

## 2022-08-03 RX ADMIN — PANTOPRAZOLE SODIUM 40 MG: 40 INJECTION, POWDER, FOR SOLUTION INTRAVENOUS at 04:08

## 2022-08-03 RX ADMIN — SODIUM CHLORIDE, SODIUM LACTATE, POTASSIUM CHLORIDE, AND CALCIUM CHLORIDE: .6; .31; .03; .02 INJECTION, SOLUTION INTRAVENOUS at 08:08

## 2022-08-03 RX ADMIN — CHLORHEXIDINE GLUCONATE 15 ML: 1.2 RINSE ORAL at 09:08

## 2022-08-03 RX ADMIN — MUPIROCIN 1 G: 20 OINTMENT TOPICAL at 08:08

## 2022-08-03 RX ADMIN — SODIUM CHLORIDE, SODIUM LACTATE, POTASSIUM CHLORIDE, AND CALCIUM CHLORIDE 500 ML: .6; .31; .03; .02 INJECTION, SOLUTION INTRAVENOUS at 08:08

## 2022-08-03 RX ADMIN — CHLORHEXIDINE GLUCONATE 15 ML: 1.2 RINSE ORAL at 08:08

## 2022-08-03 RX ADMIN — IPRATROPIUM BROMIDE AND ALBUTEROL SULFATE 3 ML: .5; 3 SOLUTION RESPIRATORY (INHALATION) at 03:08

## 2022-08-03 RX ADMIN — ENOXAPARIN SODIUM 30 MG: 30 INJECTION SUBCUTANEOUS at 05:08

## 2022-08-03 NOTE — PLAN OF CARE
Problem: Infection  Goal: Absence of Infection Signs and Symptoms  Outcome: Ongoing, Progressing     Problem: Adult Inpatient Plan of Care  Goal: Plan of Care Review  Outcome: Ongoing, Progressing  Goal: Patient-Specific Goal (Individualized)  Outcome: Ongoing, Progressing  Goal: Absence of Hospital-Acquired Illness or Injury  Outcome: Ongoing, Progressing  Goal: Optimal Comfort and Wellbeing  Outcome: Ongoing, Progressing  Goal: Readiness for Transition of Care  Outcome: Ongoing, Progressing     Problem: Diabetes Comorbidity  Goal: Blood Glucose Level Within Targeted Range  Outcome: Ongoing, Progressing     Problem: Fluid and Electrolyte Imbalance (Acute Kidney Injury/Impairment)  Goal: Fluid and Electrolyte Balance  Outcome: Ongoing, Progressing     Problem: Oral Intake Inadequate (Acute Kidney Injury/Impairment)  Goal: Optimal Nutrition Intake  Outcome: Ongoing, Progressing     Problem: Renal Function Impairment (Acute Kidney Injury/Impairment)  Goal: Effective Renal Function  Outcome: Ongoing, Progressing     Problem: Communication Impairment (Mechanical Ventilation, Invasive)  Goal: Effective Communication  Outcome: Ongoing, Progressing     Problem: Device-Related Complication Risk (Mechanical Ventilation, Invasive)  Goal: Optimal Device Function  Outcome: Ongoing, Progressing     Problem: Inability to Wean (Mechanical Ventilation, Invasive)  Goal: Mechanical Ventilation Liberation  Outcome: Ongoing, Progressing     Problem: Nutrition Impairment (Mechanical Ventilation, Invasive)  Goal: Optimal Nutrition Delivery  Outcome: Ongoing, Progressing     Problem: Skin and Tissue Injury (Mechanical Ventilation, Invasive)  Goal: Absence of Device-Related Skin and Tissue Injury  Outcome: Ongoing, Progressing     Problem: Ventilator-Induced Lung Injury (Mechanical Ventilation, Invasive)  Goal: Absence of Ventilator-Induced Lung Injury  Outcome: Ongoing, Progressing     Problem: Communication Impairment (Artificial  Airway)  Goal: Effective Communication  Outcome: Ongoing, Progressing     Problem: Device-Related Complication Risk (Artificial Airway)  Goal: Optimal Device Function  Outcome: Ongoing, Progressing     Problem: Skin and Tissue Injury (Artificial Airway)  Goal: Absence of Device-Related Skin or Tissue Injury  Outcome: Ongoing, Progressing     Problem: Noninvasive Ventilation Acute  Goal: Effective Unassisted Ventilation and Oxygenation  Outcome: Ongoing, Progressing     Problem: Fall Injury Risk  Goal: Absence of Fall and Fall-Related Injury  Outcome: Ongoing, Progressing     Problem: Skin Injury Risk Increased  Goal: Skin Health and Integrity  Outcome: Ongoing, Progressing

## 2022-08-03 NOTE — CONSULTS
[facility]  Adult Nutrition   Consult Note (Initial Assessment)     SUMMARY     Recommendations  Recommendation/Intervention:   1. Begin NG/OG feeds per MD: Recommend Vital AF 1.2 @ 40 ml/hr to meet needs. Begin @ 10ml/hr. Advance as tolerated to goal rate. Additional FWF 30 ml every 4 hrs. (total 1152 kcal 72 gm protein, and 959 ml free water).   2. Advance diet as tolerated.   3. RD monitor EN/PO tolerance and adjust as indicated.    Goals: 1. Patient to receive/consume >/= estimated energy/protein needs. 2. Pt labs trend to target range.  Nutrition Goal Status: new    Communication of RD Recs: discussed on rounds    Dietitian Rounds Brief  Consult to start EN in ICU setting. Patient with advanced age, failure to thrive per MD. Per H and P patient unable to feed self and has had poor intake for last month or so. Patient also with 14% weight loss in 3 months. NFPE not performed as patient medicated, AMS. Patient with weight loss and low po intake indicative of Severe Malnutrition.    Severe Malnutrition in the context of chronic illness    Related to (etiology):   Decreased ability to consume adequate prote-enegy to meet needs due to increased debility of advanced age.    Signs and Symptoms (as evidenced by):   < 75% estimated energy needs in > 1 month; weight loss > 7.5% in 3 months     Interventions/Recommendations (treatment strategy):  Ordered enteral nutrition to meet 100% estimated energy/protein needs.     Nutrition Diagnosis Status:   Continues    Diet order:   Current Diet Order: NPO      Evaluation of Received Nutrient/Fluid Intake  Other Calories (kcal): 816 (D5/LR @ 100 ml.hr)  Energy Calories Required: not meeting needs  Protein Required: not meeting needs  Fluid Required: meeting needs  Tolerance: tolerating     % Intake of Estimated Energy Needs: 25 - 50 %  % Meal Intake: NPO      Intake/Output Summary (Last 24 hours) at 8/3/2022 1152  Last data filed at 8/3/2022 1101  Gross per 24  "hour   Intake 1928.1 ml   Output 225 ml   Net 1703.1 ml      Anthropometrics  Temp: 98.6 °F (37 °C)  Height: 5' 4" (162.6 cm)  Height (inches): 64 in  Weight Method: Bed Scale  Weight: 45.4 kg (100 lb 1.6 oz)  Weight (lb): 100.1 lb  Ideal Body Weight (IBW), Female: 120 lb  % Ideal Body Weight, Female (lb): 83.42 %  BMI (Calculated): 17.2     Estimated/Assessed Needs  Weight Used For Calorie Calculations: 45.4 kg (100 lb 1.4 oz)  Energy Calorie Requirements (kcal): 3903-8378 kcal/day (25-30 kcal/kg)     Protein Requirements: 45-72 gm/day (1.2-1.6 gm/kg)  Weight Used For Protein Calculations: 45.4 kg (100 lb 1.4 oz)     Estimated Fluid Requirement Method: RDA Method  RDA Method (mL): 1135     Reason for Assessment  Reason For Assessment: other (see comments) (ICU)  Diagnosis: pulmonary disease (Acute respiratory failure with hypercapnia)  Relevant Medical History: CVA, essential hypertension, GERD, COPD and hyperlipidemia, severe tremors  Interdisciplinary Rounds: attended    Nutrition/Diet History  Food Allergies: NKFA  Factors Affecting Nutritional Intake: NPO, inability to feed self, on mechanical ventilation    Nutrition Risk Screen  Nutrition Risk Screen: other (see comments) (altered mental status on vent)     Weight History:  Wt Readings from Last 5 Encounters:   08/02/22 45.4 kg (100 lb 1.6 oz)   05/27/22 52.6 kg (115 lb 13.6 oz)   11/26/21 60 kg (132 lb 4.4 oz)   11/23/21 63.5 kg (140 lb)   09/16/21 69.9 kg (154 lb)      Lab/Procedures/Meds: Pertinent Labs/Meds Reviewed    Medications:Pertinent Medications Reviewed  Scheduled Meds:   albuterol-ipratropium  3 mL Nebulization Q8H    cefTRIAXone (ROCEPHIN) IVPB  1 g Intravenous Q24H    chlorhexidine  15 mL Mouth/Throat BID    enoxaparin  30 mg Subcutaneous Daily    levothyroxine  112 mcg Per OG tube Before breakfast    mupirocin   Nasal BID    pantoprazole  40 mg Intravenous Daily    potassium chloride  10 mEq Oral Once     Continuous Infusions:   " dextrose 5% lactated ringers      fentanyl Stopped (08/03/22 0742)    NORepinephrine bitartrate-D5W 0.03 mcg/kg/min (08/03/22 0800)     PRN Meds:.acetaminophen, calcium gluconate IVPB, calcium gluconate IVPB, calcium gluconate IVPB, magnesium oxide, magnesium oxide, magnesium sulfate IVPB, magnesium sulfate IVPB, magnesium sulfate IVPB, magnesium sulfate IVPB, melatonin, naloxone, ondansetron, polyethylene glycol, potassium bicarbonate, potassium bicarbonate, potassium bicarbonate, potassium chloride in water **AND** potassium chloride in water **AND** potassium chloride in water, potassium, sodium phosphates, potassium, sodium phosphates, potassium, sodium phosphates, sodium phosphate IVPB, sodium phosphate IVPB, sodium phosphate IVPB, sodium phosphate IVPB, sodium phosphate IVPB, sodium phosphate IVPB    Labs: Pertinent Labs Reviewed  Clinical Chemistry:  Recent Labs   Lab 08/02/22  1305 08/03/22  0400    143   K 3.4* 3.0*   CL 95 105   CO2 36* 28   * 75   BUN 24* 24*   CREATININE 1.0 1.0   CALCIUM 9.2 8.0*   PROT 7.3  --    ALBUMIN 3.5  --    BILITOT 1.8*  --    ALKPHOS 48*  --    AST 34  --    ALT 17  --    ANIONGAP 8 10   MG 1.9 1.4*   PHOS 4.2  --    LIPASE 31  --      CBC:   Recent Labs   Lab 08/03/22  0400 08/03/22  0533   WBC 6.01  --    RBC 4.16  --    HGB 11.7*  --    HCT 36.7* 37     --    MCV 88  --    MCH 28.1  --    MCHC 31.9*  --     < > = values in this interval not displayed.     Cardiac Profile:  Recent Labs   Lab 08/02/22  1305 08/03/22  0400   BNP 1,258*  --    CPK 59  --    TROPONINI 0.031 0.034     Thyroid & Parathyroid:  Recent Labs   Lab 08/02/22  1305   TSH 3.040     Monitor and Evaluation  Food and Nutrient Intake: energy intake, food and beverage intake, enteral nutrition intake  Food and Nutrient Adminstration: diet order, enteral and parenteral nutrition administration  Knowledge/Beliefs/Attitudes: food and nutrition knowledge/skill  Physical Activity and Function:  nutrition-related ADLs and IADLs  Anthropometric Measurements: weight, weight change, body mass index  Biochemical Data, Medical Tests and Procedures: electrolyte and renal panel, lipid profile, gastrointestinal profile, glucose/endocrine profile, inflammatory profile  Nutrition-Focused Physical Findings: overall appearance     Nutrition Risk  Level of Risk/Frequency of Follow-up: moderate-high     Nutrition Follow-Up  RD Follow-up?: Yes      Meagan Moses RD, FABRIZIO 08/03/2022 11:52 AM

## 2022-08-03 NOTE — CONSULTS
Pulmonary/Critical Care Consult      PATIENT NAME: Michelle Campo  MRN: [id number]  TODAY'S DATE: 2022  7:50 AM  ADMIT DATE: 2022  AGE: 87 y.o. : 1935    CONSULT REQUESTED BY: Joselito Bull MD    REASON FOR CONSULT:   Critical care management    HPI:  The patient is an 87 year old female with failure to thrive. She was intubated for hypercapneic respiratory failure.  She also was thought to have a urinary tract infection but her urinalysis does not support this.  The patient has been declining for 2 weeks prior to the family bringing her in when she could no longer be aroused.  The patient is requiring Levophed to maintain a blood pressure.    I have spoken with the patient's daughter who states that at her baseline she is not eating well well she requires someone to bathe her and feed her because she has severe tremors.  She has been staying with her other daughter in [facility] because the [facility] daughter has COVID.  The [place] daughter told an oral in's daughter that she was not eating well and was not drinking boost and was moving less than less.    REVIEW OF SYSTEMS  Unobtainable    ALLERGIES  Review of patient's allergies indicates:   Allergen Reactions    Pcn [penicillins] Itching     Tolerated CTX 9/15       INPATIENT SCHEDULED MEDICATIONS   cefTRIAXone (ROCEPHIN) IVPB  1 g Intravenous Q24H    enoxaparin  30 mg Subcutaneous Daily    levothyroxine  112 mcg Per OG tube Before breakfast    metronidazole  500 mg Intravenous Q8H    pantoprazole  40 mg Intravenous Daily    potassium chloride  10 mEq Oral Once      fentanyl 50 mcg/hr (22 0328)    midazolam (VERSED) infusion (non-titrating) Stopped (22 0331)    NORepinephrine bitartrate-D5W 0.05 mcg/kg/min (22 0615)       MEDICAL AND SURGICAL HISTORY  Past Medical History:   Diagnosis Date    COPD (chronic obstructive pulmonary disease) 3/8/2013    CVA (cerebral infarction) 3/8/2013    Diabetes mellitus due to  abnormal insulin 5/27/2022    Dry eye syndrome     Glaucoma     H/O: pneumothorax 3/8/2013    Spontaneous in 2007    Hyperlipidemia 3/8/2013    Hypertension 3/8/2013    Hypothyroidism 3/8/2013    Osteopenia 3/8/2013     Past Surgical History:   Procedure Laterality Date    CATARACT EXTRACTION W/  INTRAOCULAR LENS IMPLANT Bilateral n/a    Dr. Carias    HYSTERECTOMY      SELECTIVE LASER TRABECUPLASTY Bilateral 08/2017        TOTAL ABDOMINAL HYSTERECTOMY W/ BILATERAL SALPINGOOPHORECTOMY         ALCOHOL, TOBACCO AND DRUG USE  Social History     Tobacco Use   Smoking Status Former Smoker   Smokeless Tobacco Never Used   Tobacco Comment    quit smoking in 2001     Social History     Substance and Sexual Activity   Alcohol Use No     Social History     Substance and Sexual Activity   Drug Use No       FAMILY HISTORY  Family History   Problem Relation Age of Onset    Stroke Mother     Cancer Brother         bone cancer    Heart attack Son     Alcohol abuse Brother     Aneurysm Brother     Hypertension Daughter     Glaucoma Son     Amblyopia Neg Hx     Blindness Neg Hx     Cataracts Neg Hx     Macular degeneration Neg Hx     Retinal detachment Neg Hx     Strabismus Neg Hx        VITAL SIGNS (MOST RECENT)  Temp: 99 °F (37.2 °C) (08/03/22 0400)  Pulse: (!) 47 (08/03/22 0630)  Resp: 15 (08/03/22 0630)  BP: 132/62 (08/03/22 0630)  SpO2: 98 % (08/03/22 0630)    INTAKE AND OUTPUT (LAST 24 HOURS):    Intake/Output Summary (Last 24 hours) at 8/3/2022 0750  Last data filed at 8/3/2022 0602  Gross per 24 hour   Intake 1928.1 ml   Output 175 ml   Net 1753.1 ml       WEIGHT  Wt Readings from Last 1 Encounters:   08/02/22 45.4 kg (100 lb 1.6 oz)       PHYSICAL EXAM  GENERAL: Older patient in no distress, Sedated on the ventilator.  HEENT: Pupils equal and reactive. Extraocular movements intact. Nose intact. Pharynx intubated with 7.0 endotracheal tube and OG tube.  NECK: Supple.  Right IJ triple-lumen  catheter.  HEART: Regular rate and rhythm. No murmur or gallop auscultated.  LUNGS: Clear to auscultation and percussion. Lung excursion symmetrical. No change in fremitus. No adventitial noises.  ABDOMEN: Bowel sounds present. Non-tender, no masses palpated.  : Normal anatomy.  Locke with minimal concentrated urine.  EXTREMITIES: Normal muscle tone and joint movement, no cyanosis or clubbing.   LYMPHATICS: No adenopathy palpated, no edema.  SKIN: Dry, intact, no lesions.   NEURO:  Sedated  PSYCH:  Unable to assess      CBC LAST (LAST 24 HOURS)  Recent Labs   Lab 08/02/22  1305 08/03/22  0400 08/03/22  0401 08/03/22  0533   WBC 4.68 6.01  --   --    RBC 5.27 4.16  --   --    HGB 14.6 11.7*  --   --    HCT 49.9* 36.7*   < > 37   MCV 95 88  --   --    MCH 27.7 28.1  --   --    MCHC 29.3* 31.9*  --   --    RDW 15.4* 15.1*  --   --     209  --   --    MPV 10.5 10.7  --   --    GRAN 47.0  2.2  --   --   --    LYMPH 41.9  2.0  --   --   --    MONO 8.5  0.4  --   --   --    BASO 0.04  --   --   --    NRBC 1*  --   --   --     < > = values in this interval not displayed.       CHEMISTRY LAST (LAST 24 HOURS)  Recent Labs   Lab 08/02/22  1305 08/02/22  1309 08/03/22  0400 08/03/22  0401 08/03/22  0533     --  143  --   --    K 3.4*  --  3.0*  --   --    CL 95  --  105  --   --    CO2 36*  --  28  --   --    ANIONGAP 8  --  10  --   --    BUN 24*  --  24*  --   --    CREATININE 1.0  --  1.0  --   --    *  --  75  --   --    CALCIUM 9.2  --  8.0*  --   --    PH  --    < >  --    < > 7.489*   MG 1.9  --  1.4*  --   --    ALBUMIN 3.5  --   --   --   --    PROT 7.3  --   --   --   --    ALKPHOS 48*  --   --   --   --    ALT 17  --   --   --   --    AST 34  --   --   --   --    BILITOT 1.8*  --   --   --   --     < > = values in this interval not displayed.       COAGULATION LAST (LAST 24 HOURS)  Recent Labs   Lab 08/02/22  1305   LABPT 13.7   INR 1.1   APTT 29.1       CARDIAC PROFILE (LAST 24  HOURS)  Recent Labs   Lab 08/02/22  1305 08/03/22  0400   BNP 1,258*  --    CPK 59  --    TROPONINI 0.031 0.034       LAST 7 DAYS MICROBIOLOGY   Microbiology Results (last 7 days)     Procedure Component Value Units Date/Time    Culture, Respiratory with Gram Stain [[id number]] Collected: 08/02/22 1420    Order Status: Completed Specimen: Respiratory from Tracheal Aspirate Updated: 08/03/22 0553     Gram Stain (Respiratory) <10 epithelial cells per low power field.     Gram Stain (Respiratory) Moderate WBC's     Gram Stain (Respiratory) Many Gram positive cocci     Gram Stain (Respiratory) Few Gram negative rods     Gram Stain (Respiratory) Few Gram negative coccobacilli    Culture, Respiratory with Gram Stain [[id number]] Collected: 08/02/22 1420    Order Status: Sent Specimen: Sputum, Expectorated Updated: 08/03/22 0202    Blood culture x two cultures. Draw prior to antibiotics. [[id number]] Collected: 08/02/22 1330    Order Status: Completed Specimen: Blood from Peripheral, Forearm, Left Updated: 08/02/22 2117     Blood Culture, Routine No Growth to date    Narrative:      Aerobic and anaerobic    Blood culture x two cultures. Draw prior to antibiotics. [[id number]] Collected: 08/02/22 1305    Order Status: Completed Specimen: Blood from Peripheral, Antecubital, Left Updated: 08/02/22 2117     Blood Culture, Routine No Growth to date    Narrative:      Aerobic and anaerobic    Gram Stain, Respiratory [[id number]]     Order Status: Canceled Specimen: Respiratory           MOST RECENT IMAGING  X-Ray Chest 1 View  Chest single view    CLINICAL DATA: Intubation    FINDINGS: Comparison to August 2. Heart size is normal. The aortic arch is calcified. Endotracheal tube, nasogastric tube, and right-sided central venous catheter remain in place. The right-sided catheter appears to have been pulled back, with tip now at the expected level of the superior vena cava.    Scattered bilateral interstitial opacities and right sided  apical pleural thickening are unchanged. There are no significant effusions. Osseous structures are unremarkable.    IMPRESSION:  1. Right-sided central venous catheter has been pulled back, with tip now projecting at the level of the superior vena cava.  2. Additional stable findings as described.    Electronically signed by:  Todd Padilla MD  8/3/2022 7:28 AM CDT Workstation: [id number]      CURRENT VISIT EKG  Results for orders placed or performed during the hospital encounter of 08/02/22   EKG 12-lead    Narrative    Test Reason : R00.1,    Vent. Rate : 048 BPM     Atrial Rate : 048 BPM     P-R Int : 140 ms          QRS Dur : 078 ms      QT Int : 588 ms       P-R-T Axes : 073 057 261 degrees     QTc Int : 525 ms    Sinus bradycardia  Possible Left atrial enlargement  T wave abnormality, consider inferior ischemia  T wave abnormality, consider anterolateral ischemia  Prolonged QT  Abnormal ECG  When compared with ECG of 02-AUG-2022 13:54,  Significant changes have occurred    Referred By: [email or web address]   SELF           Confirmed By:        ECHOCARDIOGRAM RESULTS  No results found for this or any previous visit.        VENTILATOR INFORMATION  Vent Mode: A/C  Oxygen Concentration (%):  [] 28  Resp Rate Total:  [14 br/min-25 br/min] 15 br/min  Vt Set:  [400 mL] 400 mL  PEEP/CPAP:  [5 cmH20] 5 cmH20  Pressure Support:  [0 cmH20] 0 cmH20  Mean Airway Pressure:  [9.7 egO09-74 cmH20] 9.7 cmH20       LAST ARTERIAL BLOOD GAS  ABG  Recent Labs   Lab 08/03/22  0533   PH 7.489*   PO2 98   PCO2 39.5   HCO3 30.0*   BE 7       IMPRESSION AND PLAN  Failure to thrive, advanced age   Acute on chronic hypercapnic respiratory failure requiring mechanical ventilation  Shock, etiology unclear other than end-stage life  COPD  Hypokalemia  Hypomagnesemia  Anemia  Oliguria    Sedation vacation with spontaneous breathing trial  Replace electrolytes  Wean Levophed as tolerated  Lactated Ringer's at 50 cc an hour, 500 cc bolus  If  patient is unable to extubate from the ventilator today will begin enteral nutrition  Begin enoxaparin for DVT prophylaxis  Begin pantoprazole for GI prophylaxis  Consider discontinuing antibiotics as there is no evidence of infection anywhere, will stop Flagyl today      Critical care time spent reviewing the chart, examining the patient, reviewing the labs, reviewing the radiological findings, discussing care with nursing, physicians, and respiratory and creating the note and  has been greater than 35 minutes    Brianna Grey MD  [facility]  Department of Pulmonology  Date of Service: 08/03/2022  7:50 AM

## 2022-08-03 NOTE — NURSING
BP low levophed started    0330 patient with decreased urine output HR dropping to 40's versed off and fentanyl down to 50mcg    0430 patient without peptic ulcer prophylaxis order, spoke with Dr. Booker new orders noted.     0500 Spoke with Dr. Booker concerning decrease in urine output and electrolyte replacement and decrease HR. OK to replace electrolytes.

## 2022-08-03 NOTE — CARE UPDATE
08/03/22 0745   Patient Assessment/Suction   Level of Consciousness (AVPU) responds to voice   Respiratory Effort Normal;Unlabored   Expansion/Accessory Muscles/Retractions expansion symmetric   All Lung Fields Breath Sounds clear   Rhythm/Pattern, Respiratory assisted mechanically   PRE-TX-O2   O2 Device (Oxygen Therapy) ventilator   Pulse Oximetry Type Continuous   $ Pulse Oximetry - Multiple Charge Pulse Oximetry - Multiple        Airway - Non-Surgical 08/02/22 1315 Endotracheal Tube   Placement Date/Time: 08/02/22 1315   Method of Intubation: Glidescope;Video Laryngoscopy  Inserted by: MD  Staff/Resident Name(s): Negrita  Airway Device: Endotracheal Tube  Airway Device Size: 7.0  Style: Cuffed  Cuff Inflated With: Air  Placement Veri...   Secured at 23 cm   Measured At Lips   Secured Location Center   Secured by Commercial tube chou   Bite Block none   Vent Select   Conventional Vent Y   $ Ventilator Subsequent 1   Charged w/in last 24h YES   Preset Conventional Ventilator Settings   Vent ID [id number]   Vent Type    Humidity HME   Conventional Ventilator Alarms   Alarms On Y   Ve High Alarm 20 L/min   Ve Low Alarm 2 L/min   Vt High Alarm 1200 mL   Vt Low Alarm 200 mL   Apnea Volume (mL) 400 mL   Education   $ Education Ventilator Oxygen;15 min   Respiratory Evaluation   $ Care Plan Tech Time 15 min

## 2022-08-03 NOTE — NURSING
POC glucose check 68.  Dr Grey informed, orders to change current LR infusion to D5/LR.  Will repeat POC glucose in 1hr.

## 2022-08-03 NOTE — PLAN OF CARE
[facility]  Initial Discharge Assessment       Primary Care Provider: Lien Griffith MD    Admission Diagnosis: Acute hypercapnic respiratory failure [J96.02]    Admission Date: 8/2/2022  Expected Discharge Date:     Discharge Barriers Identified: (P) None    Assessment completed with family at bedside.  Daughter states she and her brother are POA and will provide documents to hospital if needed.  Patient intends to discharge home where she lives with her daughter, Paris Campo [phone],  she will be going to [address] [address], will need to move her [facility] home health   from [facility] to [facility].  No other needs identified at this time.  Payor: MEDICARE / Plan: MEDICARE PART A & B / Product Type: Government /     Extended Emergency Contact Information  Primary Emergency Contact: Yelitza Campo  Mobile Phone: [phone]  Relation: Daughter   needed? No  Secondary Emergency Contact: Paris Campo  Address: [address] [place] [address]  Home Phone: [phone]  Mobile Phone: [phone]  Relation: Daughter    Discharge Plan A: (P) Home with family, Home Health  Discharge Plan B: (P) Other (TBD, patiet intubated at this time)      [facility] [address] [address]  Phone: [phone] Fax: [phone]    [facility] [address] [place] [address] [address] [place] [id number]  Phone: [phone] Fax: [phone]      Initial Assessment (most recent)       Adult Discharge Assessment - 08/03/22 1420          Discharge Assessment    Assessment Type Discharge Planning Assessment (P)      Confirmed/corrected address, phone number and insurance Yes (P)      Confirmed Demographics Correct on Facesheet (P)      Source of Information family (P)      When was your last doctors appointment? -- (P)    june     Reason For Admission arf (P)      Lives With child(evan), adult (P)      Facility Arrived From: home (P)      Do you expect to return to your current living situation? Yes (P)      Do you have help at home or someone to help you manage your care at home? Yes (P)      Who are your caregiver(s) and their phone number(s)? Paris Campo [phone] (P)      Prior to hospitilization cognitive status: Alert/Oriented (P)      Current cognitive status: Coma/Sedated/Intubated (P)      Equipment Currently Used at Home walker, rolling;cane, straight;wheelchair (P)      Readmission within 30 days? No (P)      Patient currently being followed by outpatient case management? No (P)      Do you currently have service(s) that help you manage your care at home? Yes (P)      Name and Contact number of agency [facility], current with [facility] office, moving with daughter to [address] [place] [phone] (P)      Is the pt/caregiver preference to resume services with current agency Yes (P)      Do you take prescription medications? Yes (P)      Do you have prescription coverage? Yes (P)      Coverage medicare/medicaid (P)      Do you have any problems affording any of your prescribed medications? No (P)      Is the patient taking medications as prescribed? yes (P)      Who is going to help you get home at discharge? Paris Campo [phone] (P)      How do you get to doctors appointments? family or friend will provide (P)      Do you take coumadin? No (P)      Discharge Plan A Home with family;Home Health (P)      Discharge Plan B Other (P)    TBD, patiet intubated at this time    DME Needed Upon Discharge  walker, rolling;wheelchair;cane, straight (P)      Discharge Plan discussed with: Adult children (P)      Discharge Barriers Identified None (P)         Relationship/Environment    Name(s) of Who Lives With Patient Paris Campo [phone] (P)

## 2022-08-03 NOTE — CARE UPDATE
08/03/22 1400   Patient Assessment/Suction   Level of Consciousness (AVPU) responds to voice   Respiratory Effort Normal;Unlabored   Expansion/Accessory Muscles/Retractions expansion symmetric   All Lung Fields Breath Sounds clear   Rhythm/Pattern, Respiratory assisted mechanically   PRE-TX-O2   O2 Device (Oxygen Therapy) ventilator   Oxygen Concentration (%) 35   SpO2 (!) 87 %   Pulse (!) 58   Resp (!) 39   /71   Vent Select   Conventional Vent Y   Charged w/in last 24h YES   Preset Conventional Ventilator Settings   Vent ID 5   Vent Type    Ventilation Type VC   Vent Mode Spont   Humidity HME   Set Rate 14 BPM   Vt Set 400 mL   PEEP/CPAP 5 cmH20   Pressure Support 15 cmH20   Waveform RAMP   Peak Flow 50 L/min   Plateau Set/Insp. Hold (sec) 0   Insp Rise Time  50 %   Trigger Sensitivity Flow/I-Trigger 3 L/min   Patient Ventilator Parameters   Resp Rate Total 13 br/min   Peak Airway Pressure 21 cmH2O   Mean Airway Pressure 8.1 cmH20   Plateau Pressure 19 cmH20   Exhaled Vt 244 mL   Total Ve 3.42 mL   Spont Ve 3.42 L   I:E Ratio Measured 1:2.10   Conventional Ventilator Alarms   Resp Rate High Alarm 40 br/min   Press High Alarm 40 cmH2O   Apnea Rate 10   Apnea Volume (mL) 0 mL   Apnea Oxygen Concentration  100   Apnea Flow Rate (L/min) 50   T Apnea 20 sec(s)   Ready to Wean/Extubation Screen   FIO2<=50 (chart decimal) 0.35   MV<16L (chart vol.) 3.42   PEEP <=8 (chart #) 5   Ready to Wean Parameters   F/VT Ratio<105 (RSBI) 159.84   Tried pt on SBT, had to increase PSV from 10 to 15 due to low Vt (mid 100s) and increased FiO2 to 35% due to low Sats. Pt can follow commands but keeps dozing off and became bradypnea. Placed pt back on AC/VC, sats improved.

## 2022-08-03 NOTE — CARE UPDATE
08/02/22 1920   Patient Assessment/Suction   Level of Consciousness (AVPU) responds to pain   Respiratory Effort Normal;Unlabored   Expansion/Accessory Muscles/Retractions expansion symmetric   All Lung Fields Breath Sounds clear;diminished   Rhythm/Pattern, Respiratory assisted mechanically   Cough Frequency with stimulation   Cough Type assisted   Suction Method oral;tracheal   Suction Pressure (mmHg) -120 mmHg   $ Suction Charges Inline Suction Procedure Stat Charge   Secretions Amount scant   Secretions Color clear   Secretions Characteristics thin   Aspirate Toleration VENECIA (no adverse reactions)   PRE-TX-O2   O2 Device (Oxygen Therapy) ventilator   $ Is the patient on Low Flow Oxygen? Yes   Oxygen Concentration (%) 35   SpO2 96 %   Pulse Oximetry Type Continuous   $ Pulse Oximetry - Multiple Charge Pulse Oximetry - Multiple   Pulse 60   Resp 20        Airway - Non-Surgical 08/02/22 1315 Endotracheal Tube   Placement Date/Time: 08/02/22 1315   Method of Intubation: Glidescope;Video Laryngoscopy  Inserted by: MD  Staff/Resident Name(s): Negrita  Airway Device: Endotracheal Tube  Airway Device Size: 7.0  Style: Cuffed  Cuff Inflated With: Air  Placement Veri...   Secured at 23 cm   Measured At Lips   Secured Location Center   Tube Securement Device Changed? Yes   Bite Block none   Site Condition Cool;Dry   Status Intact;Secured;Patent   Site Assessment Clean;Dry   Airway Safety   Ambu bag with the patient? Yes, Adult Ambu   Is mask with the patient? Yes, Adult Mask   ETT Size 7   Respiratory Interventions   Airway/Ventilation Management airway patency maintained   Vent Select   Conventional Vent Y   Charged w/in last 24h YES   Preset Conventional Ventilator Settings   Vent ID 5   Vent Type    Ventilation Type VC   Vent Mode A/C   Humidity HME   Set Rate 20 BPM   Vt Set 400 mL   PEEP/CPAP 5 cmH20   Pressure Support 0 cmH20   Waveform RAMP   Peak Flow 50 L/min   Plateau Set/Insp. Hold (sec) 0   Trigger  Sensitivity Flow/I-Trigger 3 L/min   Patient Ventilator Parameters   Resp Rate Total 20 br/min   Peak Airway Pressure 25 cmH2O   Mean Airway Pressure 11 cmH20   Plateau Pressure 0 cmH20   Exhaled Vt 423 mL   Total Ve 8.39 mL   I:E Ratio Measured 1:2.40   Tubing ID (mm) 7 mm   Tube Type ET   Conventional Ventilator Alarms   Alarms On Y   Ve High Alarm 20 L/min   Ve Low Alarm 2 L/min   Vt High Alarm 1200 mL   Vt Low Alarm 200 mL   Resp Rate High Alarm 40 br/min   Press High Alarm 40 cmH2O   Apnea Rate 10   Apnea Volume (mL) 0 mL   Apnea Oxygen Concentration  100   Apnea Flow Rate (L/min) 50   T Apnea 20 sec(s)   IHI Ventilator Associated Pneumonia Bundle (Required)   Head of Bed Elevated  HOB 30   Oral Care Mouth swabbed;Mouth moisturizer;Mouth suctioned;Suction toothette toothbrush;with mouthwash;Lip moisturizer applied   Vent Circut Breaks Minimized Yes   Ready to Wean/Extubation Screen   FIO2<=50 (chart decimal) 0.35   MV<16L (chart vol.) 8.39   PEEP <=8 (chart #) 5   Ready to Wean Parameters   F/VT Ratio<105 (RSBI) (!) 47.28   Education   $ Education Suction;Ventilator Oxygen;15 min   Respiratory Evaluation   $ Care Plan Tech Time 15 min

## 2022-08-03 NOTE — PROGRESS NOTES
[facility] Medicine  Progress Note    Patient name: Michelle Campo  MRN: [id number]  Admit Date: 8/2/2022   LOS: 1 day     SUBJECTIVE:     Principal problem: Acute respiratory failure with hypercapnia    Interval History:  Admitted yesterday for hypercapnic respiratory failure.  Intubated on arrival.  Patient remains critically ill and intubated.  Alert when she is off sedation.  Overnight she had be restarted on norepinephrine for pressor support.    Hospital course:  87-year-old female with history of CVA, GERD, COPD and hyperlipidemia brought by EMS secondary generalized weakness of 2 weeks and altered mental status.  Intubated upon arrival to the ED for airway protection.  Found to be in acute hypercapnic respiratory failure.  Pulmonology following.    Scheduled Meds:   albuterol-ipratropium  3 mL Nebulization Q8H    cefTRIAXone (ROCEPHIN) IVPB  1 g Intravenous Q24H    chlorhexidine  15 mL Mouth/Throat BID    enoxaparin  30 mg Subcutaneous Daily    levothyroxine  112 mcg Per OG tube Before breakfast    mupirocin   Nasal BID    pantoprazole  40 mg Intravenous Daily    potassium chloride  10 mEq Oral Once     Continuous Infusions:   dextrose 5% lactated ringers      fentanyl Stopped (08/03/22 0742)    NORepinephrine bitartrate-D5W 0.03 mcg/kg/min (08/03/22 0800)     PRN Meds:acetaminophen, calcium gluconate IVPB, calcium gluconate IVPB, calcium gluconate IVPB, magnesium oxide, magnesium oxide, magnesium sulfate IVPB, magnesium sulfate IVPB, magnesium sulfate IVPB, magnesium sulfate IVPB, melatonin, naloxone, ondansetron, polyethylene glycol, potassium bicarbonate, potassium bicarbonate, potassium bicarbonate, potassium chloride in water **AND** potassium chloride in water **AND** potassium chloride in water, potassium, sodium phosphates, potassium, sodium phosphates, potassium, sodium phosphates, sodium phosphate IVPB, sodium phosphate IVPB, sodium phosphate IVPB, sodium phosphate IVPB, sodium  phosphate IVPB, sodium phosphate IVPB    Review of patient's allergies indicates:   Allergen Reactions    Pcn [penicillins] Itching     Tolerated CTX 9/15       Review of Systems:  Unable to obtain - intubated     OBJECTIVE:     Vital Signs (Most Recent)  Temp: 98.6 °F (37 °C) (08/03/22 0701)  Pulse: (!) 46 (08/03/22 0701)  Resp: 16 (08/03/22 0701)  BP: 132/62 (08/03/22 0701)  SpO2: 98 % (08/03/22 0701)    Vital Signs Range (Last 24H):  Temp:  [97.7 °F (36.5 °C)-99.2 °F (37.3 °C)]   Pulse:  []   Resp:  [13-26]   BP: ()/(56-95)   SpO2:  [91 %-100 %]   Arterial Line BP: ()/(47-72)     I & O (Last 24H):    Intake/Output Summary (Last 24 hours) at 8/3/2022 1032  Last data filed at 8/3/2022 0602  Gross per 24 hour   Intake 1928.1 ml   Output 175 ml   Net 1753.1 ml       Physical Exam:  General: Patient resting comfortably in no acute distress.  Appears frail  Eyes: No conjunctival injection. No scleral icterus.  ENT: No discharge from ears. No nasal discharge.  ET and OG tubes noted  CVS: RRR. No LE edema BL  Lungs:  Mechanical breath sounds  Abdomen:  Soft, nontender and nondistended.  No organomegaly  Neuro:  Sedated  Skin:  No rash or erythema noted  MSK:  Severe muscle wasting noted  :  Locke catheter with clear yellow urine    Laboratory:  I have reviewed all pertinent lab results within the past 24 hours.  CBC:   Recent Labs   Lab 08/03/22  0400 08/03/22  0401 08/03/22  0533   WBC 6.01  --   --    RBC 4.16  --   --    HGB 11.7*  --   --    HCT 36.7*   < > 37     --   --    MCV 88  --   --    MCH 28.1  --   --    MCHC 31.9*  --   --     < > = values in this interval not displayed.     CMP:   Recent Labs   Lab 08/02/22  1305 08/03/22  0400   * 75   CALCIUM 9.2 8.0*   ALBUMIN 3.5  --    PROT 7.3  --     143   K 3.4* 3.0*   CO2 36* 28   CL 95 105   BUN 24* 24*   CREATININE 1.0 1.0   ALKPHOS 48*  --    ALT 17  --    AST 34  --    BILITOT 1.8*  --      ABGs:   Recent Labs   Lab  08/03/22  0533   PH 7.489*   PCO2 39.5   PO2 98   HCO3 30.0*   POCSATURATED 98   BE 7       Diagnostic Results:  Labs: Reviewed    ASSESSMENT/PLAN:     Active Hospital Problems    Diagnosis  POA    *Acute respiratory failure with hypercapnia [J96.02]  Yes    Hypertension [I10]  Yes    Hyperlipidemia [E78.5]  Yes    Hypothyroidism [E03.9]  Yes    COPD (chronic obstructive pulmonary disease) [J44.9]  Yes      Resolved Hospital Problems   No resolved problems to display.       Plan:  Acute hypercapnic respiratory failure complicated by metabolic encephalopathy   Mechanical ventilation initiated for airway protection   Sx likely due to worsening COPD. Not on any medications which can cause hypoventilation   Appreciate pulmonology input  Continue ceftriaxone.  Discontinued metronidazole  ABGs p.r.n.  Pulmonology consultation   Monitor electrolytes and replete per protocol    Shock is likely hypovolemic  Norepinephrine to maintain MAP greater than 65 mmHg  Wean as tolerated  Continue maintenance IV fluids    Failure to thrive  Dietitian consult   IV fluids for now      Chronic medical conditions:  Hypothyroidism: Continue levothyroxine   Hyperlipidemia: Continue home statin   Glaucoma: Continue latanoprost, dorzolamide and brimonidine -timolol      VTE Risk Mitigation (From admission, onward)           Ordered     enoxaparin injection 30 mg  Daily         08/02/22 1705     IP VTE HIGH RISK PATIENT  Once         08/02/22 1705     Place sequential compression device  Until discontinued         08/02/22 1705                        Department Hospital Medicine  [facility]  Joselito Bull MD  Date of service: 08/03/2022

## 2022-08-04 LAB
ALLENS TEST: ABNORMAL
ANION GAP SERPL CALC-SCNC: 6 MMOL/L (ref 8–16)
BACTERIA SPEC AEROBE CULT: NORMAL
BUN SERPL-MCNC: 25 MG/DL (ref 8–23)
CALCIUM SERPL-MCNC: 8.2 MG/DL (ref 8.7–10.5)
CHLORIDE SERPL-SCNC: 106 MMOL/L (ref 95–110)
CO2 SERPL-SCNC: 31 MMOL/L (ref 23–29)
CORTIS SERPL-MCNC: 18.8 UG/DL
CREAT SERPL-MCNC: 1.1 MG/DL (ref 0.5–1.4)
DELSYS: ABNORMAL
ERYTHROCYTE [DISTWIDTH] IN BLOOD BY AUTOMATED COUNT: 15.9 % (ref 11.5–14.5)
EST. GFR  (NO RACE VARIABLE): 49.2 ML/MIN/1.73 M^2
FIO2: 28
GLUCOSE SERPL-MCNC: 153 MG/DL (ref 70–110)
GLUCOSE SERPL-MCNC: 165 MG/DL (ref 70–110)
GRAM STN SPEC: NORMAL
HCO3 UR-SCNC: 31.4 MMOL/L (ref 24–28)
HCT VFR BLD AUTO: 37.6 % (ref 37–48.5)
HCT VFR BLD CALC: 36 %PCV (ref 36–54)
HGB BLD-MCNC: 11.5 G/DL (ref 12–16)
MAGNESIUM SERPL-MCNC: 2.3 MG/DL (ref 1.6–2.6)
MCH RBC QN AUTO: 27.9 PG (ref 27–31)
MCHC RBC AUTO-ENTMCNC: 30.6 G/DL (ref 32–36)
MCV RBC AUTO: 91 FL (ref 82–98)
MIN VOL: 4.5
MODE: ABNORMAL
PCO2 BLDA: 60 MMHG (ref 35–45)
PEEP: 5
PH SMN: 7.33 [PH] (ref 7.35–7.45)
PHOSPHATE SERPL-MCNC: 4.7 MG/DL (ref 2.7–4.5)
PLATELET # BLD AUTO: 192 K/UL (ref 150–450)
PMV BLD AUTO: 10.4 FL (ref 9.2–12.9)
PO2 BLDA: 87 MMHG (ref 80–100)
POC BE: 5 MMOL/L
POC IONIZED CALCIUM: 1.23 MMOL/L (ref 1.06–1.42)
POC SATURATED O2: 96 % (ref 95–100)
POC TCO2: 33 MMOL/L (ref 23–27)
POTASSIUM BLD-SCNC: 3.4 MMOL/L (ref 3.5–5.1)
POTASSIUM SERPL-SCNC: 3.5 MMOL/L (ref 3.5–5.1)
PS: 15
RBC # BLD AUTO: 4.12 M/UL (ref 4–5.4)
SAMPLE: ABNORMAL
SITE: ABNORMAL
SODIUM BLD-SCNC: 143 MMOL/L (ref 136–145)
SODIUM SERPL-SCNC: 143 MMOL/L (ref 136–145)
SP02: 97
SPONT RATE: 12
WBC # BLD AUTO: 5.21 K/UL (ref 3.9–12.7)

## 2022-08-04 PROCEDURE — 25000003 PHARM REV CODE 250

## 2022-08-04 PROCEDURE — 83735 ASSAY OF MAGNESIUM: CPT | Performed by: INTERNAL MEDICINE

## 2022-08-04 PROCEDURE — 80048 BASIC METABOLIC PNL TOTAL CA: CPT | Performed by: INTERNAL MEDICINE

## 2022-08-04 PROCEDURE — 63600175 PHARM REV CODE 636 W HCPCS: Performed by: INTERNAL MEDICINE

## 2022-08-04 PROCEDURE — 20000000 HC ICU ROOM

## 2022-08-04 PROCEDURE — 94003 VENT MGMT INPAT SUBQ DAY: CPT

## 2022-08-04 PROCEDURE — 84100 ASSAY OF PHOSPHORUS: CPT | Performed by: INTERNAL MEDICINE

## 2022-08-04 PROCEDURE — 27000221 HC OXYGEN, UP TO 24 HOURS

## 2022-08-04 PROCEDURE — 37799 UNLISTED PX VASCULAR SURGERY: CPT

## 2022-08-04 PROCEDURE — 85027 COMPLETE CBC AUTOMATED: CPT | Performed by: INTERNAL MEDICINE

## 2022-08-04 PROCEDURE — 99291 CRITICAL CARE FIRST HOUR: CPT | Mod: ,,, | Performed by: INTERNAL MEDICINE

## 2022-08-04 PROCEDURE — 25000242 PHARM REV CODE 250 ALT 637 W/ HCPCS: Performed by: INTERNAL MEDICINE

## 2022-08-04 PROCEDURE — 99900026 HC AIRWAY MAINTENANCE (STAT)

## 2022-08-04 PROCEDURE — 94640 AIRWAY INHALATION TREATMENT: CPT

## 2022-08-04 PROCEDURE — 99291 PR CRITICAL CARE, E/M 30-74 MINUTES: ICD-10-PCS | Mod: ,,, | Performed by: INTERNAL MEDICINE

## 2022-08-04 PROCEDURE — 84132 ASSAY OF SERUM POTASSIUM: CPT

## 2022-08-04 PROCEDURE — 94761 N-INVAS EAR/PLS OXIMETRY MLT: CPT

## 2022-08-04 PROCEDURE — 99900031 HC PATIENT EDUCATION (STAT)

## 2022-08-04 PROCEDURE — C9113 INJ PANTOPRAZOLE SODIUM, VIA: HCPCS | Performed by: INTERNAL MEDICINE

## 2022-08-04 PROCEDURE — 36415 COLL VENOUS BLD VENIPUNCTURE: CPT | Performed by: INTERNAL MEDICINE

## 2022-08-04 PROCEDURE — 82533 TOTAL CORTISOL: CPT | Performed by: INTERNAL MEDICINE

## 2022-08-04 PROCEDURE — 82330 ASSAY OF CALCIUM: CPT

## 2022-08-04 PROCEDURE — 99900035 HC TECH TIME PER 15 MIN (STAT)

## 2022-08-04 PROCEDURE — 82803 BLOOD GASES ANY COMBINATION: CPT

## 2022-08-04 PROCEDURE — 84295 ASSAY OF SERUM SODIUM: CPT

## 2022-08-04 PROCEDURE — 25000003 PHARM REV CODE 250: Performed by: INTERNAL MEDICINE

## 2022-08-04 PROCEDURE — 85014 HEMATOCRIT: CPT

## 2022-08-04 RX ADMIN — POTASSIUM CHLORIDE 60 MEQ: 14.9 INJECTION, SOLUTION INTRAVENOUS at 07:08

## 2022-08-04 RX ADMIN — DORZOLAMIDE HYDROCHLORIDE 1 DROP: 20 SOLUTION/ DROPS OPHTHALMIC at 10:08

## 2022-08-04 RX ADMIN — PANTOPRAZOLE SODIUM 40 MG: 40 INJECTION, POWDER, FOR SOLUTION INTRAVENOUS at 10:08

## 2022-08-04 RX ADMIN — LEVOTHYROXINE SODIUM 112 MCG: 0.11 TABLET ORAL at 05:08

## 2022-08-04 RX ADMIN — ENOXAPARIN SODIUM 30 MG: 30 INJECTION SUBCUTANEOUS at 05:08

## 2022-08-04 RX ADMIN — CHLORHEXIDINE GLUCONATE 15 ML: 1.2 RINSE ORAL at 10:08

## 2022-08-04 RX ADMIN — IPRATROPIUM BROMIDE AND ALBUTEROL SULFATE 3 ML: .5; 3 SOLUTION RESPIRATORY (INHALATION) at 07:08

## 2022-08-04 RX ADMIN — SODIUM CHLORIDE, SODIUM LACTATE, POTASSIUM CHLORIDE, AND CALCIUM CHLORIDE 1000 ML: .6; .31; .03; .02 INJECTION, SOLUTION INTRAVENOUS at 08:08

## 2022-08-04 RX ADMIN — MUPIROCIN 1 G: 20 OINTMENT TOPICAL at 10:08

## 2022-08-04 RX ADMIN — IPRATROPIUM BROMIDE AND ALBUTEROL SULFATE 3 ML: .5; 3 SOLUTION RESPIRATORY (INHALATION) at 12:08

## 2022-08-04 RX ADMIN — LATANOPROST 1 DROP: 50 SOLUTION OPHTHALMIC at 10:08

## 2022-08-04 RX ADMIN — IPRATROPIUM BROMIDE AND ALBUTEROL SULFATE 3 ML: .5; 3 SOLUTION RESPIRATORY (INHALATION) at 03:08

## 2022-08-04 NOTE — RESPIRATORY THERAPY
08/03/22 2025   Patient Assessment/Suction   Level of Consciousness (AVPU) alert   Respiratory Effort Normal;Unlabored   Expansion/Accessory Muscles/Retractions no use of accessory muscles   All Lung Fields Breath Sounds equal bilaterally;diminished;coarse   Rhythm/Pattern, Respiratory assisted mechanically   Cough Frequency with stimulation   Cough Type assisted   Suction Method tracheal   $ Suction Charges Inline Suction Procedure Stat Charge   Secretions Amount small   Secretions Color cloudy;white   Secretions Characteristics thick   PRE-TX-O2   O2 Device (Oxygen Therapy) ventilator   Oxygen Concentration (%) 28   SpO2 98 %   Pulse Oximetry Type Continuous   $ Pulse Oximetry - Multiple Charge Pulse Oximetry - Multiple   Pulse (!) 49   Resp 15        Airway - Non-Surgical 08/02/22 1315 Endotracheal Tube   Placement Date/Time: 08/02/22 1315   Method of Intubation: Glidescope;Video Laryngoscopy  Inserted by: MD  Staff/Resident Name(s): Negrita  Airway Device: Endotracheal Tube  Airway Device Size: 7.0  Style: Cuffed  Cuff Inflated With: Air  Placement Veri...   Secured at 23 cm   Measured At Lips   Secured Location Right   Secured by Commercial tube chou   Bite Block none   Site Condition Cool;Dry   Status Intact;Secured;Patent   Site Assessment Clean;Dry   Cuff Pressure   (MLT)   Airway Safety   Ambu bag with the patient? Yes, Adult Ambu   Is mask with the patient? Yes, Adult Mask   Respiratory Interventions   Airway/Ventilation Management airway patency maintained   Vent Select   Conventional Vent Y   Charged w/in last 24h YES   Preset Conventional Ventilator Settings   Vent ID [id number]   Vent Type    Ventilation Type VC   Vent Mode A/C   Humidity HME   Set Rate 14 BPM   Vt Set 400 mL   PEEP/CPAP 5 cmH20   Pressure Support 0 cmH20   Waveform RAMP   Peak Flow 50 L/min   Plateau Set/Insp. Hold (sec) 0   Trigger Sensitivity Flow/I-Trigger 3 L/min   Patient Ventilator Parameters   Resp Rate Total 15 br/min   Peak  Airway Pressure 21 cmH2O   Mean Airway Pressure 9.4 cmH20   Plateau Pressure 19 cmH20   Exhaled Vt 418 mL   Total Ve 6.13 mL   I:E Ratio Measured 1:1.90   Conventional Ventilator Alarms   Alarms On Y   Resp Rate High Alarm 40 br/min   Press High Alarm 40 cmH2O   Apnea Rate 10   Apnea Volume (mL) 0 mL   Apnea Oxygen Concentration  100   Apnea Flow Rate (L/min) 50   T Apnea 20 sec(s)   IHI Ventilator Associated Pneumonia Bundle (Required)   Oral Care Mouth suctioned   Ready to Wean/Extubation Screen   FIO2<=50 (chart decimal) 0.28   MV<16L (chart vol.) 6.13   PEEP <=8 (chart #) 5   Ready to Wean Parameters   F/VT Ratio<105 (RSBI) (!) 35.89   Education   $ Education Suction;Ventilator Oxygen;15 min   Respiratory Evaluation   $ Care Plan Tech Time 15 min   $ Eval/Re-eval Charges Evaluation   Evaluation For New Orders

## 2022-08-04 NOTE — PROGRESS NOTES
[facility] Medicine  Progress Note    Patient name: Michelle Campo  MRN: [id number]  Admit Date: 8/2/2022   LOS: 2 days     SUBJECTIVE:     Principal problem: Acute respiratory failure with hypercapnia    Interval History:  No acute overnight events reported.  Requiring low-dose norepinephrine for pressor support.  Remains intubated.    Hospital course:  87-year-old female with history of CVA, GERD, COPD and hyperlipidemia brought by EMS secondary generalized weakness of 2 weeks and altered mental status.  Intubated upon arrival to the ED for airway protection.  Found to be in acute hypercapnic respiratory failure.  Pulmonology following.  Hospital stay notable for hypovolemic shock requiring norepinephrine.    Scheduled Meds:   albuterol-ipratropium  3 mL Nebulization Q8H    chlorhexidine  15 mL Mouth/Throat BID    dorzolamide  1 drop Both Eyes TID    enoxaparin  30 mg Subcutaneous Daily    latanoprost  1 drop Both Eyes QHS    levothyroxine  112 mcg Per OG tube Before breakfast    mupirocin   Nasal BID    pantoprazole  40 mg Intravenous Daily    potassium chloride  10 mEq Oral Once     Continuous Infusions:   dextrose 5% lactated ringers 100 mL/hr at 08/03/22 2106    fentanyl 25 mcg/hr (08/04/22 1300)    NORepinephrine bitartrate-D5W Stopped (08/04/22 1020)     PRN Meds:acetaminophen, calcium gluconate IVPB, calcium gluconate IVPB, calcium gluconate IVPB, magnesium oxide, magnesium oxide, magnesium sulfate IVPB, magnesium sulfate IVPB, magnesium sulfate IVPB, magnesium sulfate IVPB, melatonin, naloxone, ondansetron, polyethylene glycol, potassium bicarbonate, potassium bicarbonate, potassium bicarbonate, potassium chloride in water **AND** potassium chloride in water **AND** potassium chloride in water, potassium, sodium phosphates, potassium, sodium phosphates, potassium, sodium phosphates, sodium phosphate IVPB, sodium phosphate IVPB, sodium phosphate IVPB, sodium phosphate IVPB,  sodium phosphate IVPB, sodium phosphate IVPB    Review of patient's allergies indicates:   Allergen Reactions    Pcn [penicillins] Itching     Tolerated CTX 9/15       Review of Systems:  Unable to obtain - intubated     OBJECTIVE:     Vital Signs (Most Recent)  Temp: 98.3 °F (36.8 °C) (08/04/22 1101)  Pulse: 65 (08/04/22 1524)  Resp: 18 (08/04/22 1524)  BP: (!) 93/55 (08/04/22 1515)  SpO2: 100 % (08/04/22 1524)    Vital Signs Range (Last 24H):  Temp:  [97.5 °F (36.4 °C)-98.3 °F (36.8 °C)]   Pulse:  [44-93]   Resp:  [12-50]   BP: ()/(46-79)   SpO2:  [89 %-100 %]   Arterial Line BP: ()/(37-54)     I & O (Last 24H):    Intake/Output Summary (Last 24 hours) at 8/4/2022 1613  Last data filed at 8/4/2022 1501  Gross per 24 hour   Intake 3391.18 ml   Output 290 ml   Net 3101.18 ml       Physical Exam:  General: Patient resting comfortably in no acute distress.  Appears frail  Eyes: No conjunctival injection. No scleral icterus.  ENT: No discharge from ears. No nasal discharge.  ET and OG tubes noted  CVS: RRR. No LE edema BL  Lungs:  Mechanical breath sounds  Abdomen:  Soft, nontender and nondistended.  No organomegaly  Neuro:  Sedated  Skin:  No rash or erythema noted  MSK:  Severe muscle wasting noted  :  Locke catheter with clear yellow urine    Laboratory:  I have reviewed all pertinent lab results within the past 24 hours.  CBC:   Recent Labs   Lab 08/04/22  0443 08/04/22  0508   WBC 5.21  --    RBC 4.12  --    HGB 11.5*  --    HCT 37.6 36     --    MCV 91  --    MCH 27.9  --    MCHC 30.6*  --      CMP:   Recent Labs   Lab 08/02/22  1305 08/03/22  0400 08/04/22  0443   *   < > 165*   CALCIUM 9.2   < > 8.2*   ALBUMIN 3.5  --   --    PROT 7.3  --   --       < > 143   K 3.4*   < > 3.5   CO2 36*   < > 31*   CL 95   < > 106   BUN 24*   < > 25*   CREATININE 1.0   < > 1.1   ALKPHOS 48*  --   --    ALT 17  --   --    AST 34  --   --    BILITOT 1.8*  --   --     < > = values in this interval  not displayed.     ABGs:   Recent Labs   Lab 08/04/22  0508   PH 7.326*   PCO2 60.0*   PO2 87   HCO3 31.4*   POCSATURATED 96   BE 5       Diagnostic Results:  Labs: Reviewed    ASSESSMENT/PLAN:     Active Hospital Problems    Diagnosis  POA    *Acute respiratory failure with hypercapnia [J96.02]  Yes    Hypovolemic shock [R57.1]  Yes    Hypertension [I10]  Yes    Hyperlipidemia [E78.5]  Yes    Hypothyroidism [E03.9]  Yes    COPD (chronic obstructive pulmonary disease) [J44.9]  Yes      Resolved Hospital Problems   No resolved problems to display.       Plan:  Acute hypercapnic respiratory failure complicated by metabolic encephalopathy   Mechanical ventilation initiated for airway protection   Sx likely due to worsening COPD. Not on any medications which can cause hypoventilation   Appreciate pulmonology input  Monitor off antibiotics  ABGs p.r.n.  Pulmonology consultation   Monitor electrolytes and replete per protocol    Shock is likely hypovolemic  Norepinephrine to maintain MAP greater than 65 mmHg  Wean as tolerated  Continue maintenance IV fluids    Failure to thrive  Started on enteral nutrition  Monitor electrolytes and replete per protocol     Chronic medical conditions:  Hypothyroidism: Continue levothyroxine   Hyperlipidemia: Continue home statin   Glaucoma: Continue latanoprost, dorzolamide and brimonidine -timolol    VTE Risk Mitigation (From admission, onward)         Ordered     enoxaparin injection 30 mg  Daily         08/02/22 1705     IP VTE HIGH RISK PATIENT  Once         08/02/22 1705     Place sequential compression device  Until discontinued         08/02/22 1705                    Department Hospital Medicine  [facility]  Joselito Bull MD  Date of service: 08/04/2022

## 2022-08-04 NOTE — CARE UPDATE
08/04/22 0718   Patient Assessment/Suction   Level of Consciousness (AVPU) alert   All Lung Fields Breath Sounds clear;coarse   Suction Method tracheal   $ Suction Charges Inline Suction Procedure Stat Charge   Secretions Amount small   Secretions Color yellow   Secretions Characteristics thick   PRE-TX-O2   O2 Device (Oxygen Therapy) ventilator   $ Is the patient on Low Flow Oxygen? Yes   Oxygen Concentration (%) 28   SpO2 99 %   Pulse Oximetry Type Continuous   $ Pulse Oximetry - Multiple Charge Pulse Oximetry - Multiple   Pulse (!) 55   Resp 14   Aerosol Therapy   $ Aerosol Therapy Charges Aerosol Treatment   Daily Review of Necessity (SVN) completed   Respiratory Treatment Status (SVN) given   Treatment Route (SVN) in-line   Patient Position (SVN) semi-Munson's   Post Treatment Assessment (SVN) increased aeration   Signs of Intolerance (SVN) none        Airway - Non-Surgical 08/02/22 1315 Endotracheal Tube   Placement Date/Time: 08/02/22 1315   Method of Intubation: Glidescope;Video Laryngoscopy  Inserted by: MD  Staff/Resident Name(s): Negrita  Airway Device: Endotracheal Tube  Airway Device Size: 7.0  Style: Cuffed  Cuff Inflated With: Air  Placement Veri...   Secured at 24 cm   Measured At Lips   Secured Location Right   Secured by Commercial tube chou   Vent Select   $ Ventilator Subsequent 1   Charged w/in last 24h YES   Ready to Wean/Extubation Screen   FIO2<=50 (chart decimal) 0.28   Education   $ Education Bronchodilator;15 min   Respiratory Evaluation   $ Care Plan Tech Time 15 min

## 2022-08-04 NOTE — PROGRESS NOTES
Pulmonary/Critical Care Consult      PATIENT NAME: Michelle Campo  MRN: [id number]  TODAY'S DATE: 2022  7:50 AM  ADMIT DATE: 2022  AGE: 87 y.o. : 1935    CONSULT REQUESTED BY: Joselito Bull MD    REASON FOR CONSULT:   Critical care management    HPI:  The patient is an 87 year old female with failure to thrive. She was intubated for hypercapneic respiratory failure.  She also was thought to have a urinary tract infection but her urinalysis does not support this.  The patient has been declining for 2 weeks prior to the family bringing her in when she could no longer be aroused.  The patient is requiring Levophed to maintain a blood pressure.    I have spoken with the patient's daughter who states that at her baseline she is not eating well well she requires someone to bathe her and feed her because she has severe tremors.  She has been staying with her other daughter in [facility] because the [facility] daughter has COVID.  The [place] daughter told an oral in's daughter that she was not eating well and was not drinking boost and was moving less than less.     the patient is still requiring Levophed at 0.02.  She still is requiring assist-control ventilation to correct her respiratory acidosis.  She is awake and interactive but quite weak.  She is receiving enteral nutrition.  Her urine output is poor.    REVIEW OF SYSTEMS  Unobtainable    No change in the patient's Past Medical History, Past Surgical History, Social History or Family History since admission.        VITAL SIGNS (MOST RECENT)  Temp: 97.5 °F (36.4 °C) (22 0400)  Pulse: 60 (22)  Resp: (!) 38 (22)  BP: (!) 88/51 (22)  SpO2: 97 % (22)    INTAKE AND OUTPUT (LAST 24 HOURS):    Intake/Output Summary (Last 24 hours) at 2022 0610  Last data filed at 2022 0536  Gross per 24 hour   Intake 2461.18 ml   Output 250 ml   Net 2211.18 ml       WEIGHT  Wt Readings from Last 1 Encounters:    08/02/22 45.4 kg (100 lb 1.6 oz)       PHYSICAL EXAM  GENERAL: Older patient in no distress, awake on the ventilator.  HEENT: Pupils equal and reactive. Extraocular movements intact. Nose intact. Pharynx intubated with 7.0 endotracheal tube and OG tube.  NECK: Supple.  Right IJ triple-lumen catheter.  HEART: Regular rate and rhythm. No murmur or gallop auscultated.  LUNGS: Clear to auscultation and percussion. Lung excursion symmetrical. No change in fremitus. No adventitial noises.  ABDOMEN: Bowel sounds present. Non-tender, no masses palpated.  : Normal anatomy.  Locke with minimal concentrated urine.  EXTREMITIES: Normal muscle tone and joint movement, no cyanosis or clubbing.   LYMPHATICS: No adenopathy palpated, there is edema to the right elbow..  SKIN: Dry, intact, no lesions.   NEURO:  Awake, cranial nerves appear intact.  The patient has a significant resting tremor to her left arm.  Motor strength is 5/5 in the upper extremities.  She is 5/5 but much weaker in her lower extremities.  She is able to nod/shake her head to questions.  PSYCH:  Quiet      CBC LAST (LAST 24 HOURS)  Recent Labs   Lab 08/04/22  0443 08/04/22  0508   WBC 5.21  --    RBC 4.12  --    HGB 11.5*  --    HCT 37.6 36   MCV 91  --    MCH 27.9  --    MCHC 30.6*  --    RDW 15.9*  --      --    MPV 10.4  --        CHEMISTRY LAST (LAST 24 HOURS)  Recent Labs   Lab 08/04/22  0443 08/04/22  0508     --    K 3.5  --      --    CO2 31*  --    ANIONGAP 6*  --    BUN 25*  --    CREATININE 1.1  --    *  --    CALCIUM 8.2*  --    PH  --  7.326*   MG 2.3  --        CARDIAC PROFILE (LAST 24 HOURS)  Recent Labs   Lab 08/02/22  1305 08/03/22  0400   BNP 1,258*  --    CPK 59  --    TROPONINI 0.031 0.034       LAST 7 DAYS MICROBIOLOGY   Microbiology Results (last 7 days)     Procedure Component Value Units Date/Time    Blood culture x two cultures. Draw prior to antibiotics. [[id number]] Collected: 08/02/22 1330    Order Status:  Completed Specimen: Blood from Peripheral, Forearm, Left Updated: 08/03/22 1432     Blood Culture, Routine No Growth to date      No Growth to date    Narrative:      Aerobic and anaerobic    Blood culture x two cultures. Draw prior to antibiotics. [[id number]] Collected: 08/02/22 1305    Order Status: Completed Specimen: Blood from Peripheral, Antecubital, Left Updated: 08/03/22 1432     Blood Culture, Routine No Growth to date      No Growth to date    Narrative:      Aerobic and anaerobic    Culture, Respiratory with Gram Stain [[id number]] Collected: 08/02/22 1420    Order Status: Completed Specimen: Respiratory from Tracheal Aspirate Updated: 08/03/22 0811     Respiratory Culture Normal respiratory tru     Gram Stain (Respiratory) <10 epithelial cells per low power field.     Gram Stain (Respiratory) Moderate WBC's     Gram Stain (Respiratory) Many Gram positive cocci     Gram Stain (Respiratory) Few Gram negative rods     Gram Stain (Respiratory) Few Gram negative coccobacilli    Culture, Respiratory with Gram Stain [[id number]] Collected: 08/02/22 1420    Order Status: Sent Specimen: Sputum, Expectorated Updated: 08/03/22 0202    Gram Stain, Respiratory [[id number]]     Order Status: Canceled Specimen: Respiratory           MOST RECENT IMAGING  X-Ray Chest 1 View  Chest single view    CLINICAL DATA: Intubation    FINDINGS: Comparison to August 2. Heart size is normal. The aortic arch is calcified. Endotracheal tube, nasogastric tube, and right-sided central venous catheter remain in place. The right-sided catheter appears to have been pulled back, with tip now at the expected level of the superior vena cava.    Scattered bilateral interstitial opacities and right sided apical pleural thickening are unchanged. There are no significant effusions. Osseous structures are unremarkable.    IMPRESSION:  1. Right-sided central venous catheter has been pulled back, with tip now projecting at the level of the superior  vena cava.  2. Additional stable findings as described.    Electronically signed by:  Todd Padilla MD  8/3/2022 7:28 AM CDT Workstation: [id number]      CURRENT VISIT EKG  Results for orders placed or performed during the hospital encounter of 08/02/22   EKG 12-lead    Narrative    Test Reason : R00.1,    Vent. Rate : 048 BPM     Atrial Rate : 048 BPM     P-R Int : 140 ms          QRS Dur : 078 ms      QT Int : 588 ms       P-R-T Axes : 073 057 261 degrees     QTc Int : 525 ms    Sinus bradycardia  Possible Left atrial enlargement  T wave abnormality, consider inferior ischemia  T wave abnormality, consider anterolateral ischemia  Prolonged QT  Abnormal ECG  When compared with ECG of 02-AUG-2022 13:54,  Significant changes have occurred    Referred By: [email or web address]   SELF           Confirmed By:        ECHOCARDIOGRAM RESULTS  No results found for this or any previous visit.        VENTILATOR INFORMATION  Vent Mode: A/C  Oxygen Concentration (%):  [28-35] 28  Resp Rate Total:  [9.5 br/min-28 br/min] 14 br/min  Vt Set:  [400 mL] 400 mL  PEEP/CPAP:  [5 cmH20] 5 cmH20  Pressure Support:  [0 cmH20-15 cmH20] 0 cmH20  Mean Airway Pressure:  [8 cmH20-9.5 cmH20] 8.3 cmH20       LAST ARTERIAL BLOOD GAS  ABG on pressure support of 15, peep of 5  Recent Labs   Lab 08/04/22  0508   PH 7.326*   PO2 87   PCO2 60.0*   HCO3 31.4*   BE 5       IMPRESSION AND PLAN  Failure to thrive, advanced age   Acute on chronic hypercapnic respiratory failure requiring mechanical ventilation  Shock, etiology unclear other than end-stage life, still requiring Levophed  COPD  Hypokalemia, improving  Hypomagnesemia, corrected  Hypophophotemia, corrected  Anemia  Oliguria    Minimal sedation  Replace electrolytes  Wean Levophed as tolerated  Bolus another L of LR and continue LR  Continue enteral nutrition  Enoxaparin for DVT prophylaxis  Pantoprazole for GI prophylaxis  Discontinue Rocephin  Measure a.m. cortisol on this morning's labs  Continue  spontaneous breathing trials each morning    Critical care time spent reviewing the chart, examining the patient, reviewing the labs, reviewing the radiological findings, discussing care with nursing, physicians, and respiratory and creating the note and  has been greater than 35 minutes    Brianna Grey MD  [facility]  Department of Pulmonology  Date of Service: 08/04/2022  7:50 AM

## 2022-08-04 NOTE — CARE UPDATE
Increased PS to 15 due to small Vt, and also compensate for smaller ET tube (7.0). Vt improved > 350ml.

## 2022-08-04 NOTE — PLAN OF CARE
08/04/22 1528   Post-Acute Status   Post-Acute Authorization Home Health   Home Health Status Pending medical clearance/testing   CM requested [facility] of care [facility] will [facility] discharge to to make sure sent to correct office)

## 2022-08-04 NOTE — PLAN OF CARE
Problem: Infection  Goal: Absence of Infection Signs and Symptoms  Outcome: Ongoing, Progressing     Problem: Adult Inpatient Plan of Care  Goal: Plan of Care Review  Outcome: Ongoing, Progressing  Goal: Patient-Specific Goal (Individualized)  Outcome: Ongoing, Progressing  Goal: Absence of Hospital-Acquired Illness or Injury  Outcome: Ongoing, Progressing  Goal: Optimal Comfort and Wellbeing  Outcome: Ongoing, Progressing  Goal: Readiness for Transition of Care  Outcome: Ongoing, Progressing     Problem: Diabetes Comorbidity  Goal: Blood Glucose Level Within Targeted Range  Outcome: Ongoing, Progressing     Problem: Fluid and Electrolyte Imbalance (Acute Kidney Injury/Impairment)  Goal: Fluid and Electrolyte Balance  Outcome: Ongoing, Progressing     Problem: Oral Intake Inadequate (Acute Kidney Injury/Impairment)  Goal: Optimal Nutrition Intake  Outcome: Ongoing, Progressing     Problem: Renal Function Impairment (Acute Kidney Injury/Impairment)  Goal: Effective Renal Function  Outcome: Ongoing, Progressing     Problem: Communication Impairment (Mechanical Ventilation, Invasive)  Goal: Effective Communication  Outcome: Ongoing, Progressing     Problem: Device-Related Complication Risk (Mechanical Ventilation, Invasive)  Goal: Optimal Device Function  Outcome: Ongoing, Progressing     Problem: Inability to Wean (Mechanical Ventilation, Invasive)  Goal: Mechanical Ventilation Liberation  Outcome: Ongoing, Progressing     Problem: Nutrition Impairment (Mechanical Ventilation, Invasive)  Goal: Optimal Nutrition Delivery  Outcome: Ongoing, Progressing     Problem: Skin and Tissue Injury (Mechanical Ventilation, Invasive)  Goal: Absence of Device-Related Skin and Tissue Injury  Outcome: Ongoing, Progressing     Problem: Ventilator-Induced Lung Injury (Mechanical Ventilation, Invasive)  Goal: Absence of Ventilator-Induced Lung Injury  Outcome: Ongoing, Progressing     Problem: Communication Impairment (Artificial  Airway)  Goal: Effective Communication  Outcome: Ongoing, Progressing     Problem: Device-Related Complication Risk (Artificial Airway)  Goal: Optimal Device Function  Outcome: Ongoing, Progressing     Problem: Skin and Tissue Injury (Artificial Airway)  Goal: Absence of Device-Related Skin or Tissue Injury  Outcome: Ongoing, Progressing     Problem: Noninvasive Ventilation Acute  Goal: Effective Unassisted Ventilation and Oxygenation  Outcome: Ongoing, Progressing     Problem: Fall Injury Risk  Goal: Absence of Fall and Fall-Related Injury  Outcome: Ongoing, Progressing     Problem: Skin Injury Risk Increased  Goal: Skin Health and Integrity  Outcome: Ongoing, Progressing     Problem: Feeding Intolerance (Enteral Nutrition)  Goal: Feeding Tolerance  Outcome: Ongoing, Progressing     Problem: Malnutrition  Goal: Improved Nutritional Intake  Outcome: Ongoing, Progressing     Problem: Restraint, Nonbehavioral (Nonviolent)  Goal: Absence of Harm or Injury  Outcome: Ongoing, Progressing

## 2022-08-05 LAB
ALLENS TEST: ABNORMAL
ANION GAP SERPL CALC-SCNC: 3 MMOL/L (ref 8–16)
BUN SERPL-MCNC: 20 MG/DL (ref 8–23)
CALCIUM SERPL-MCNC: 8.1 MG/DL (ref 8.7–10.5)
CHLORIDE SERPL-SCNC: 106 MMOL/L (ref 95–110)
CO2 SERPL-SCNC: 32 MMOL/L (ref 23–29)
CREAT SERPL-MCNC: 0.8 MG/DL (ref 0.5–1.4)
DELSYS: ABNORMAL
ERYTHROCYTE [DISTWIDTH] IN BLOOD BY AUTOMATED COUNT: 15.9 % (ref 11.5–14.5)
ERYTHROCYTE [SEDIMENTATION RATE] IN BLOOD BY WESTERGREN METHOD: 14 MM/H
EST. GFR  (NO RACE VARIABLE): >60 ML/MIN/1.73 M^2
FIO2: 28
FIO2: 35
FLOW: 3
GLUCOSE SERPL-MCNC: 109 MG/DL (ref 70–110)
GLUCOSE SERPL-MCNC: 111 MG/DL (ref 70–110)
GLUCOSE SERPL-MCNC: 91 MG/DL (ref 70–110)
GLUCOSE SERPL-MCNC: 93 MG/DL (ref 70–110)
HCO3 UR-SCNC: 30.5 MMOL/L (ref 24–28)
HCO3 UR-SCNC: 31.5 MMOL/L (ref 24–28)
HCO3 UR-SCNC: 31.9 MMOL/L (ref 24–28)
HCT VFR BLD AUTO: 33.1 % (ref 37–48.5)
HCT VFR BLD CALC: 33 %PCV (ref 36–54)
HCT VFR BLD CALC: 35 %PCV (ref 36–54)
HCT VFR BLD CALC: 36 %PCV (ref 36–54)
HGB BLD-MCNC: 10 G/DL (ref 12–16)
MAGNESIUM SERPL-MCNC: 1.8 MG/DL (ref 1.6–2.6)
MCH RBC QN AUTO: 27.6 PG (ref 27–31)
MCHC RBC AUTO-ENTMCNC: 30.2 G/DL (ref 32–36)
MCV RBC AUTO: 91 FL (ref 82–98)
MIN VOL: 8.2
MODE: ABNORMAL
PCO2 BLDA: 56 MMHG (ref 35–45)
PCO2 BLDA: 58 MMHG (ref 35–45)
PCO2 BLDA: 65 MMHG (ref 35–45)
PEEP: 5
PEEP: 5
PH SMN: 7.3 [PH] (ref 7.35–7.45)
PH SMN: 7.34 [PH] (ref 7.35–7.45)
PH SMN: 7.34 [PH] (ref 7.35–7.45)
PHOSPHATE SERPL-MCNC: 3.3 MG/DL (ref 2.7–4.5)
PIP: 29
PLATELET # BLD AUTO: 166 K/UL (ref 150–450)
PMV BLD AUTO: 10.5 FL (ref 9.2–12.9)
PO2 BLDA: 101 MMHG (ref 80–100)
PO2 BLDA: 54 MMHG (ref 80–100)
PO2 BLDA: 99 MMHG (ref 80–100)
POC BE: 5 MMOL/L
POC BE: 5 MMOL/L
POC BE: 6 MMOL/L
POC IONIZED CALCIUM: 1.23 MMOL/L (ref 1.06–1.42)
POC IONIZED CALCIUM: 1.27 MMOL/L (ref 1.06–1.42)
POC IONIZED CALCIUM: 1.3 MMOL/L (ref 1.06–1.42)
POC SATURATED O2: 85 % (ref 95–100)
POC SATURATED O2: 97 % (ref 95–100)
POC SATURATED O2: 97 % (ref 95–100)
POC TCO2: 32 MMOL/L (ref 23–27)
POC TCO2: 33 MMOL/L (ref 23–27)
POC TCO2: 34 MMOL/L (ref 23–27)
POTASSIUM BLD-SCNC: 4.4 MMOL/L (ref 3.5–5.1)
POTASSIUM BLD-SCNC: 4.4 MMOL/L (ref 3.5–5.1)
POTASSIUM BLD-SCNC: 4.5 MMOL/L (ref 3.5–5.1)
POTASSIUM SERPL-SCNC: 4.4 MMOL/L (ref 3.5–5.1)
PS: 10
RBC # BLD AUTO: 3.62 M/UL (ref 4–5.4)
SAMPLE: ABNORMAL
SITE: ABNORMAL
SODIUM BLD-SCNC: 140 MMOL/L (ref 136–145)
SODIUM BLD-SCNC: 141 MMOL/L (ref 136–145)
SODIUM BLD-SCNC: 141 MMOL/L (ref 136–145)
SODIUM SERPL-SCNC: 141 MMOL/L (ref 136–145)
SP02: 95
SP02: 98
SPONT RATE: 21
VT: 400
WBC # BLD AUTO: 5.13 K/UL (ref 3.9–12.7)

## 2022-08-05 PROCEDURE — 82330 ASSAY OF CALCIUM: CPT

## 2022-08-05 PROCEDURE — 80048 BASIC METABOLIC PNL TOTAL CA: CPT | Performed by: INTERNAL MEDICINE

## 2022-08-05 PROCEDURE — 99900035 HC TECH TIME PER 15 MIN (STAT)

## 2022-08-05 PROCEDURE — 84295 ASSAY OF SERUM SODIUM: CPT

## 2022-08-05 PROCEDURE — 85014 HEMATOCRIT: CPT

## 2022-08-05 PROCEDURE — 20000000 HC ICU ROOM

## 2022-08-05 PROCEDURE — 97530 THERAPEUTIC ACTIVITIES: CPT

## 2022-08-05 PROCEDURE — 99291 PR CRITICAL CARE, E/M 30-74 MINUTES: ICD-10-PCS | Mod: ,,, | Performed by: INTERNAL MEDICINE

## 2022-08-05 PROCEDURE — 63600175 PHARM REV CODE 636 W HCPCS: Performed by: INTERNAL MEDICINE

## 2022-08-05 PROCEDURE — 94003 VENT MGMT INPAT SUBQ DAY: CPT

## 2022-08-05 PROCEDURE — 99900031 HC PATIENT EDUCATION (STAT)

## 2022-08-05 PROCEDURE — 94799 UNLISTED PULMONARY SVC/PX: CPT

## 2022-08-05 PROCEDURE — 27000221 HC OXYGEN, UP TO 24 HOURS

## 2022-08-05 PROCEDURE — 25000242 PHARM REV CODE 250 ALT 637 W/ HCPCS: Performed by: INTERNAL MEDICINE

## 2022-08-05 PROCEDURE — 25000003 PHARM REV CODE 250: Performed by: INTERNAL MEDICINE

## 2022-08-05 PROCEDURE — 84132 ASSAY OF SERUM POTASSIUM: CPT

## 2022-08-05 PROCEDURE — 94761 N-INVAS EAR/PLS OXIMETRY MLT: CPT

## 2022-08-05 PROCEDURE — 36600 WITHDRAWAL OF ARTERIAL BLOOD: CPT

## 2022-08-05 PROCEDURE — 99900026 HC AIRWAY MAINTENANCE (STAT)

## 2022-08-05 PROCEDURE — 85027 COMPLETE CBC AUTOMATED: CPT | Performed by: INTERNAL MEDICINE

## 2022-08-05 PROCEDURE — 99291 CRITICAL CARE FIRST HOUR: CPT | Mod: ,,, | Performed by: INTERNAL MEDICINE

## 2022-08-05 PROCEDURE — 82803 BLOOD GASES ANY COMBINATION: CPT

## 2022-08-05 PROCEDURE — 94010 BREATHING CAPACITY TEST: CPT

## 2022-08-05 PROCEDURE — 84100 ASSAY OF PHOSPHORUS: CPT | Performed by: INTERNAL MEDICINE

## 2022-08-05 PROCEDURE — 83735 ASSAY OF MAGNESIUM: CPT | Performed by: INTERNAL MEDICINE

## 2022-08-05 PROCEDURE — 27100171 HC OXYGEN HIGH FLOW UP TO 24 HOURS

## 2022-08-05 PROCEDURE — 97163 PT EVAL HIGH COMPLEX 45 MIN: CPT

## 2022-08-05 PROCEDURE — 25000003 PHARM REV CODE 250

## 2022-08-05 PROCEDURE — 94640 AIRWAY INHALATION TREATMENT: CPT

## 2022-08-05 PROCEDURE — 94660 CPAP INITIATION&MGMT: CPT

## 2022-08-05 RX ORDER — FAMOTIDINE 20 MG/1
20 TABLET, FILM COATED ORAL DAILY
Status: DISCONTINUED | OUTPATIENT
Start: 2022-08-05 | End: 2022-08-07

## 2022-08-05 RX ADMIN — IPRATROPIUM BROMIDE AND ALBUTEROL SULFATE 3 ML: .5; 3 SOLUTION RESPIRATORY (INHALATION) at 02:08

## 2022-08-05 RX ADMIN — IPRATROPIUM BROMIDE AND ALBUTEROL SULFATE 3 ML: .5; 3 SOLUTION RESPIRATORY (INHALATION) at 07:08

## 2022-08-05 RX ADMIN — IPRATROPIUM BROMIDE AND ALBUTEROL SULFATE 3 ML: .5; 3 SOLUTION RESPIRATORY (INHALATION) at 11:08

## 2022-08-05 RX ADMIN — CHLORHEXIDINE GLUCONATE 15 ML: 1.2 RINSE ORAL at 09:08

## 2022-08-05 RX ADMIN — MUPIROCIN 1 G: 20 OINTMENT TOPICAL at 09:08

## 2022-08-05 RX ADMIN — ENOXAPARIN SODIUM 30 MG: 30 INJECTION SUBCUTANEOUS at 04:08

## 2022-08-05 RX ADMIN — LEVOTHYROXINE SODIUM 112 MCG: 0.11 TABLET ORAL at 06:08

## 2022-08-05 RX ADMIN — DORZOLAMIDE HYDROCHLORIDE 1 DROP: 20 SOLUTION/ DROPS OPHTHALMIC at 09:08

## 2022-08-05 RX ADMIN — LATANOPROST 1 DROP: 50 SOLUTION OPHTHALMIC at 09:08

## 2022-08-05 RX ADMIN — IPRATROPIUM BROMIDE AND ALBUTEROL SULFATE 3 ML: .5; 3 SOLUTION RESPIRATORY (INHALATION) at 01:08

## 2022-08-05 RX ADMIN — DORZOLAMIDE HYDROCHLORIDE 1 DROP: 20 SOLUTION/ DROPS OPHTHALMIC at 03:08

## 2022-08-05 RX ADMIN — FAMOTIDINE 20 MG: 20 TABLET ORAL at 09:08

## 2022-08-05 NOTE — PT/OT/SLP PROGRESS
Occupational Therapy      Patient Name:  Michelle Campo   MRN:  [id number]    Patient not seen today secondary to  (Hold per RN, agitation.). Will follow-up tomorrow.    8/5/2022

## 2022-08-05 NOTE — PLAN OF CARE
Problem: Malnutrition  Goal: Improved Nutritional Intake  Outcome: Ongoing, Progressing     Problem: Feeding Intolerance (Enteral Nutrition)  Goal: Feeding Tolerance  Outcome: Ongoing, Progressing  Intervention: Prevent and Manage Feeding Intolerance  Flowsheets (Taken 8/5/2022 1236)  Nutrition Support Management: tube feeding continued as ordered

## 2022-08-05 NOTE — PLAN OF CARE
Problem: Communication Impairment (Mechanical Ventilation, Invasive)  Goal: Effective Communication  Outcome: Met     Problem: Device-Related Complication Risk (Mechanical Ventilation, Invasive)  Goal: Optimal Device Function  Outcome: Met     Problem: Inability to Wean (Mechanical Ventilation, Invasive)  Goal: Mechanical Ventilation Liberation  Outcome: Met     Problem: Nutrition Impairment (Mechanical Ventilation, Invasive)  Goal: Optimal Nutrition Delivery  Outcome: Met     Problem: Skin and Tissue Injury (Mechanical Ventilation, Invasive)  Goal: Absence of Device-Related Skin and Tissue Injury  Outcome: Met     Problem: Ventilator-Induced Lung Injury (Mechanical Ventilation, Invasive)  Goal: Absence of Ventilator-Induced Lung Injury  Outcome: Met     Problem: Communication Impairment (Artificial Airway)  Goal: Effective Communication  Outcome: Met     Problem: Device-Related Complication Risk (Artificial Airway)  Goal: Optimal Device Function  Outcome: Met     Problem: Skin and Tissue Injury (Artificial Airway)  Goal: Absence of Device-Related Skin or Tissue Injury  Outcome: Met     Problem: Restraint, Nonbehavioral (Nonviolent)  Goal: Absence of Harm or Injury  Outcome: Met
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
## 2022-08-05 NOTE — CARE UPDATE
EXTUBATED AT14:58 PLACED ON 4LNC   08/05/22 4332   Patient Assessment/Suction   Level of Consciousness (AVPU) alert   Respiratory Effort Normal;Unlabored   Expansion/Accessory Muscles/Retractions no use of accessory muscles;expansion symmetric   All Lung Fields Breath Sounds coarse;diminished   Rhythm/Pattern, Respiratory assisted mechanically   Cough Frequency with stimulation   Secretions Amount small   Secretions Color white;yellow   Secretions Characteristics thick   Vent Select   Conventional Vent Y   Charged w/in last 24h YES   Ready to Wean Parameters   $ Extubation Tech Time Tech Time 30 min   Extubated? Yes   Reason for Extubation Extubated

## 2022-08-05 NOTE — PROGRESS NOTES
[facility]  Adult Nutrition   Progress Note (Follow-Up)    SUMMARY      Recommendations:   1. Continue Vital AF 1.2 @ 40 ml/hr to meet needs. Additional FWF 30 ml every 4 hrs. (total 1152 kcal 72 gm protein, and 959 ml free water).   2. Advance diet as tolerated.  3. RD to follow for EN/PO tolerance and adjust as indicated.     Goals:   Goals: 1. Patient to receive/consume >/= estimated energy/protein needs. 2. Pt labs trend to target range.    Dietitian Rounds Brief  Patient tolerating enteral feeds at goal rate per Nursing. Labs reviewed and improving. patient to get up in chair later today. Family supportive and at bedside. RD to follow PRN.    Diet order: NPO    TF rate/provision: Vital AF 1.2 @ 40 ml/hr + additional free water flushes.every 4 hrs. (total 1152 kcal 72 gm protein, and 959 ml free water).     % Intake of Estimated Energy Needs: 75 - 100 %  % Meal Intake: NPO    Estimated/Assessed Needs  Weight Used For Calorie Calculations: 45.4 kg (100 lb 1.4 oz)  Energy Calorie Requirements (kcal): 5669-2549 kcal/day (25-30 kcal/kg)     Protein Requirements: 45-72 gm/day (1.2-1.6 gm/kg)  Weight Used For Protein Calculations: 45.4 kg (100 lb 1.4 oz)     Estimated Fluid Requirement Method: RDA Method  RDA Method (mL): 1135     Weight History:  Wt Readings from Last 5 Encounters:   08/05/22 58 kg (127 lb 13.9 oz)   05/27/22 52.6 kg (115 lb 13.6 oz)   11/26/21 60 kg (132 lb 4.4 oz)   11/23/21 63.5 kg (140 lb)   09/16/21 69.9 kg (154 lb)      Reason for Assessment  Reason For Assessment: other (see comments) (ICU)  Diagnosis: pulmonary disease (Acute respiratory failure with hypercapnia)  Relevant Medical History: CVA, essential hypertension, GERD, COPD and hyperlipidemia, severe tremors  Interdisciplinary Rounds: attended    Medications:Pertinent Medications Reviewed  Scheduled Meds:   albuterol-ipratropium  3 mL Nebulization Q8H    chlorhexidine  15 mL Mouth/Throat BID    dorzolamide  1 drop Both  Eyes TID    enoxaparin  30 mg Subcutaneous Daily    famotidine  20 mg Oral Daily    latanoprost  1 drop Both Eyes QHS    levothyroxine  112 mcg Per OG tube Before breakfast    mupirocin   Nasal BID    potassium chloride  10 mEq Oral Once     Continuous Infusions:  PRN Meds:.acetaminophen, calcium gluconate IVPB, calcium gluconate IVPB, calcium gluconate IVPB, magnesium oxide, magnesium oxide, magnesium sulfate IVPB, magnesium sulfate IVPB, magnesium sulfate IVPB, magnesium sulfate IVPB, melatonin, naloxone, ondansetron, polyethylene glycol, potassium bicarbonate, potassium bicarbonate, potassium bicarbonate, potassium chloride in water **AND** potassium chloride in water **AND** potassium chloride in water, potassium, sodium phosphates, potassium, sodium phosphates, potassium, sodium phosphates, sodium phosphate IVPB, sodium phosphate IVPB, sodium phosphate IVPB, sodium phosphate IVPB, sodium phosphate IVPB, sodium phosphate IVPB    Labs: Pertinent Labs Reviewed  Clinical Chemistry:  Recent Labs   Lab 08/02/22  1305 08/03/22  1515 08/04/22 0443 08/05/22 0432     --  143 141   K 3.4*  --  3.5 4.4   CL 95  --  106 106   CO2 36*  --  31* 32*   *  --  165* 111*   BUN 24*  --  25* 20   CREATININE 1.0  --  1.1 0.8   CALCIUM 9.2  --  8.2* 8.1*   PROT 7.3  --   --   --    ALBUMIN 3.5  --   --   --    BILITOT 1.8*  --   --   --    ALKPHOS 48*  --   --   --    AST 34  --   --   --    ALT 17  --   --   --    ANIONGAP 8  --  6* 3*   MG 1.9  --  2.3 1.8   PHOS 4.2 1.6* 4.7* 3.3   LIPASE 31  --   --   --     < > = values in this interval not displayed.     CBC:   Recent Labs   Lab 08/05/22  0432 08/05/22 0446   WBC 5.13  --    RBC 3.62*  --    HGB 10.0*  --    HCT 33.1* 33*     --    MCV 91  --    MCH 27.6  --    MCHC 30.2*  --      Cardiac Profile:  Recent Labs   Lab 08/02/22  1305 08/03/22  0400   BNP 1,258*  --    CPK 59  --    TROPONINI 0.031 0.034     Thyroid & Parathyroid:  Recent Labs   Lab  08/02/22  1305   TSH 3.040     Monitor and Evaluation  Food and Nutrient Intake: energy intake, food and beverage intake, enteral nutrition intake  Food and Nutrient Adminstration: diet order, enteral and parenteral nutrition administration  Knowledge/Beliefs/Attitudes: food and nutrition knowledge/skill  Physical Activity and Function: nutrition-related ADLs and IADLs  Anthropometric Measurements: weight, weight change, body mass index  Biochemical Data, Medical Tests and Procedures: electrolyte and renal panel, lipid profile, gastrointestinal profile, glucose/endocrine profile, inflammatory profile  Nutrition-Focused Physical Findings: overall appearance     Nutrition Risk  Level of Risk/Frequency of Follow-up: high     Nutrition Follow-Up  RD Follow-up?: Yes    Meagan Moses RD 08/05/2022 12:25 PM

## 2022-08-05 NOTE — RESPIRATORY THERAPY
08/05/22 0101   Patient Assessment/Suction   Level of Consciousness (AVPU) alert   Respiratory Effort Normal;Unlabored   Expansion/Accessory Muscles/Retractions no use of accessory muscles   All Lung Fields Breath Sounds diminished   Rhythm/Pattern, Respiratory assisted mechanically   Cough Frequency   (REFUSED SUCTIONING)   PRE-TX-O2   O2 Device (Oxygen Therapy) ventilator   $ Is the patient on Low Flow Oxygen? Yes   Oxygen Concentration (%) 28   SpO2 100 %   Pulse Oximetry Type Continuous   $ Pulse Oximetry - Multiple Charge Pulse Oximetry - Multiple   Pulse 82   Resp (!) 22   Aerosol Therapy   $ Aerosol Therapy Charges Aerosol Treatment   Daily Review of Necessity (SVN) completed   Respiratory Treatment Status (SVN) given   Treatment Route (SVN) in-line;ventilator   Patient Position (SVN) semi-Munson's   Post Treatment Assessment (SVN) increased aeration   Signs of Intolerance (SVN) none   Breath Sounds Post-Respiratory Treatment   Throughout All Fields Post-Treatment All Fields   Throughout All Fields Post-Treatment aeration increased   Post-treatment Heart Rate (beats/min) 69   Post-treatment Resp Rate (breaths/min) 15        Airway - Non-Surgical 08/02/22 1315 Endotracheal Tube   Placement Date/Time: 08/02/22 1315   Method of Intubation: Glidescope;Video Laryngoscopy  Inserted by: MD  Staff/Resident Name(s): Negrita  Airway Device: Endotracheal Tube  Airway Device Size: 7.0  Style: Cuffed  Cuff Inflated With: Air  Placement Veri...   Secured at 23 cm   Measured At Lips   Secured Location Center   Secured by Commercial tube chou   Bite Block none   Site Condition Cool;Dry   Status Intact;Secured;Patent   Site Assessment Clean;Dry   Airway Safety   Ambu bag with the patient? Yes, Adult Ambu   Is mask with the patient? Yes, Adult Mask   Vent Select   Conventional Vent Y   Charged w/in last 24h YES   Preset Conventional Ventilator Settings   Vent ID [id number]   Vent Type    Ventilation Type VC   Vent Mode A/C    Humidity HME   Set Rate 14 BPM   Vt Set 400 mL   PEEP/CPAP 5 cmH20   Pressure Support 0 cmH20   Waveform RAMP   Peak Flow 50 L/min   Plateau Set/Insp. Hold (sec) 0   Trigger Sensitivity Flow/I-Trigger 3 L/min   Patient Ventilator Parameters   Resp Rate Total 18 br/min   Peak Airway Pressure 34 cmH2O   Mean Airway Pressure 12 cmH20   Plateau Pressure 19 cmH20   Exhaled Vt 450 mL   Total Ve 7.48 mL   I:E Ratio Measured 1:1.50   Tubing ID (mm) 7 mm   Tube Type ET   Conventional Ventilator Alarms   Alarms On Y   Resp Rate High Alarm 40 br/min   Press High Alarm 60 cmH2O   Apnea Rate 10   Apnea Volume (mL) 0 mL   Apnea Oxygen Concentration  100   Apnea Flow Rate (L/min) 50   T Apnea 20 sec(s)   IHI Ventilator Associated Pneumonia Bundle (Required)   Head of Bed Elevated  HOB 30   Oral Care   (REFUSED ORAL CARE)   Ready to Wean/Extubation Screen   FIO2<=50 (chart decimal) 0.28   MV<16L (chart vol.) 7.48   PEEP <=8 (chart #) 5   Ready to Wean Parameters   F/VT Ratio<105 (RSBI) (!) 48.89   Education   $ Education Bronchodilator;Ventilator Oxygen;15 min   Respiratory Evaluation   $ Care Plan Tech Time 15 min

## 2022-08-05 NOTE — PLAN OF CARE
08/05/22 1628   Patient Assessment/Suction   Level of Consciousness (AVPU) alert   Respiratory Effort Normal;Unlabored   Expansion/Accessory Muscles/Retractions no use of accessory muscles   All Lung Fields Breath Sounds Lateral:;clear;diminished   Rhythm/Pattern, Respiratory unlabored   Cough Frequency infrequent   Cough Type congested;fair;nonproductive   PRE-TX-O2   O2 Device (Oxygen Therapy) BiPAP   $ Is the patient on Low Flow Oxygen? Yes   $ Is the patient on High Flow Oxygen? Yes   $ Noninvasive Daily Charge Noninvasive Daily   Oxygen Concentration (%) 30   SpO2 96 %   Pulse Oximetry Type Continuous   Probe Placed On (Pulse Ox) finger   Oximetry Probe Site Assessed;Intact   Pulse 98   Resp (!) 24   General Safety Checklist   Safety Promotion/Fall Prevention side rails raised   Airway Safety   Ambu bag with the patient? Yes, Adult Ambu   Is mask with the patient? Yes, Adult Mask   Suction set is at the bedside? Yes   ETT at Bedside? Yes   ETT Size 7   Ready to Wean/Extubation Screen   FIO2<=50 (chart decimal) 0.3   Preset CPAP/BiPAP Settings   Mode Of Delivery BiPAP   $ CPAP/BiPAP Daily Charge BiPAP/CPAP Daily   $ Initial CPAP/BiPAP Setup? Yes   $ Is patient using? Yes   Size of Mask Small/Medium   Sized Appropriately? Yes   Equipment Type V60   Airway Device Type small full face mask   CPAP (cm H2O) 12   Ipap 6   Set Rate (Breaths/Min) 20   ITime (sec) 1   Rise Time (sec) 3   Patient CPAP/BiPAP Settings   Timed Inspiration (Sec) 1   IPAP Rise Time (sec) 3   RR Total (Breaths/Min) 22   Tidal Volume (mL) 458   VE Minute Ventilation (L/min) 9.2 L/min   Peak Inspiratory Pressure (cm H2O) 22   TiTOT (%) 28   Total Leak (L/Min) 88   CPAP/BiPAP Backup Settings   IPAP Backup 12 cmH2O   EPAP Backup 6 cmH2O   Backup Rate 20 breaths per minute (bpm)   FIO2 Backup 30 %   ITIME Backup 1 seconds   Rise Time Backup 3 seconds   CPAP/BiPAP Alarms   High Pressure (cm H2O) 45   Low Pressure (cm H2O) 10   Minute Ventilation  (L/Min) 5   High RR (breaths/min) 45   Apnea (Sec) 20   Education   $ Education BiPAP;15 min

## 2022-08-05 NOTE — CARE UPDATE
08/05/22 1614   PRE-TX-O2   SpO2 95 %   Pulse 75   Labs   $ Was an ABG obtained? Arterial Puncture   $ Labs Tech Time 15 min   Critical Value Communication   Date Result Received 08/05/22   Time Result Received 1614   Resulting Department of Critical Value Respiratory   Who communicated critical value from resulting department? Brea Broussard, RRt   Critical Test #1 PH   Critical Test #1 Result 7.298   Critical Test #2 PCO2   Critical Test #2 Result 65   Critical Test #3  HCO3   Critical Test #3 Result 31.9   Name of Notified Physician/Designee Luz Elena Anglin RN   Date Notified 08/05/22   Time Notified 1617   Read Back Verification Yes   Physician Directive place patient on BIPAP

## 2022-08-05 NOTE — PROGRESS NOTES
[facility] Medicine  Progress Note    Patient name: Michelle Campo  MRN: [id number]  Admit Date: 8/2/2022   LOS: 3 days     SUBJECTIVE:     Principal problem: Acute respiratory failure with hypercapnia    Interval History:  No acute overnight events reported.  Is off Levophed.  He still remains intubated however sitting in the chair, communicating with family members, moves all extremities pulling tidal volume up to 400 .  No other acute events overnight as per nursing staff.  Suboptimal urine output.  In significant positive fluid balance    Hospital course:  87-year-old female with history of CVA, GERD, COPD and hyperlipidemia brought by EMS secondary generalized weakness of 2 weeks and altered mental status.  Intubated upon arrival to the ED for airway protection.  Found to be in acute hypercapnic respiratory failure.  Pulmonology following.  Hospital stay notable for hypovolemic shock requiring norepinephrine.    Scheduled Meds:   albuterol-ipratropium  3 mL Nebulization Q8H    chlorhexidine  15 mL Mouth/Throat BID    dorzolamide  1 drop Both Eyes TID    enoxaparin  30 mg Subcutaneous Daily    famotidine  20 mg Oral Daily    latanoprost  1 drop Both Eyes QHS    levothyroxine  112 mcg Per OG tube Before breakfast    mupirocin   Nasal BID    potassium chloride  10 mEq Oral Once     Continuous Infusions:    PRN Meds:acetaminophen, calcium gluconate IVPB, calcium gluconate IVPB, calcium gluconate IVPB, magnesium oxide, magnesium oxide, magnesium sulfate IVPB, magnesium sulfate IVPB, magnesium sulfate IVPB, magnesium sulfate IVPB, melatonin, naloxone, ondansetron, polyethylene glycol, potassium bicarbonate, potassium bicarbonate, potassium bicarbonate, potassium chloride in water **AND** potassium chloride in water **AND** potassium chloride in water, potassium, sodium phosphates, potassium, sodium phosphates, potassium, sodium phosphates, sodium phosphate IVPB, sodium phosphate IVPB,  sodium phosphate IVPB, sodium phosphate IVPB, sodium phosphate IVPB, sodium phosphate IVPB    Review of patient's allergies indicates:   Allergen Reactions    Pcn [penicillins] Itching     Tolerated CTX 9/15       Review of Systems:  Unable to obtain - intubated     OBJECTIVE:     Vital Signs (Most Recent)  Temp: 97.2 °F (36.2 °C) (08/05/22 1105)  Pulse: 75 (08/05/22 1345)  Resp: (!) 46 (08/05/22 0930)  BP: 117/73 (08/05/22 1330)  SpO2: 100 % (08/05/22 1345)    Vital Signs Range (Last 24H):  Temp:  [97 °F (36.1 °C)-98.8 °F (37.1 °C)]   Pulse:  [62-96]   Resp:  [16-48]   BP: ()/(46-85)   SpO2:  [98 %-100 %]     I & O (Last 24H):    Intake/Output Summary (Last 24 hours) at 8/5/2022 1351  Last data filed at 8/5/2022 1100  Gross per 24 hour   Intake 4463.87 ml   Output 580 ml   Net 3883.87 ml       Physical Exam:  General: Patient resting comfortably in no acute distress.  Appears frail  Eyes: No conjunctival injection. No scleral icterus.  ENT: No discharge from ears. No nasal discharge.  ET and OG tubes noted  CVS: RRR. No LE edema BL  Lungs:  Mechanical breath sounds  Abdomen:  Soft, nontender and nondistended.  No organomegaly  Neuro:  Alert, moving all extremities  Skin:  No rash or erythema noted  MSK:  Severe muscle wasting noted  :  Locke catheter with clear yellow urine    Laboratory:  I have reviewed all pertinent lab results within the past 24 hours.  CBC:   Recent Labs   Lab 08/05/22  0432 08/05/22  0446   WBC 5.13  --    RBC 3.62*  --    HGB 10.0*  --    HCT 33.1* 33*     --    MCV 91  --    MCH 27.6  --    MCHC 30.2*  --      CMP:   Recent Labs   Lab 08/02/22  1305 08/03/22  0400 08/05/22  0432   *   < > 111*   CALCIUM 9.2   < > 8.1*   ALBUMIN 3.5  --   --    PROT 7.3  --   --       < > 141   K 3.4*   < > 4.4   CO2 36*   < > 32*   CL 95   < > 106   BUN 24*   < > 20   CREATININE 1.0   < > 0.8   ALKPHOS 48*  --   --    ALT 17  --   --    AST 34  --   --    BILITOT 1.8*  --   --      < > = values in this interval not displayed.     ABGs:   Recent Labs   Lab 08/05/22  0446   PH 7.345*   PCO2 56.0*   PO2 54*   HCO3 30.5*   POCSATURATED 85*   BE 5       Diagnostic Results:  Labs: Reviewed    ASSESSMENT/PLAN:     Active Hospital Problems    Diagnosis  POA    *Acute respiratory failure with hypercapnia [J96.02]  Yes    Hypovolemic shock [R57.1]  Yes    Hypertension [I10]  Yes    Hyperlipidemia [E78.5]  Yes    Hypothyroidism [E03.9]  Yes    COPD (chronic obstructive pulmonary disease) [J44.9]  Yes      Resolved Hospital Problems   No resolved problems to display.       Plan:  Acute hypercapnic respiratory failure complicated by metabolic encephalopathy   Mechanical ventilation initiated for airway protection   Sx likely due to worsening COPD. Not on any medications which can cause hypoventilation   Appreciate pulmonology input  Monitor off antibiotics  ABGs p.r.n.  Pulmonology consultation   Monitor electrolytes and replete per protocol  Likely extubation soon    Shock is likely hypovolemic:  Resolved  Off Levophed, stop IV fluids, encourage p.o. hydration    Failure to thrive  Started on enteral nutrition  Monitor electrolytes and replete per protocol     Chronic medical conditions:  Hypothyroidism: Continue levothyroxine   Hyperlipidemia: Continue home statin   Glaucoma: Continue latanoprost, dorzolamide and brimonidine -timolol    Updated multiple family members at bedside.     VTE Risk Mitigation (From admission, onward)         Ordered     enoxaparin injection 30 mg  Daily         08/02/22 1705     IP VTE HIGH RISK PATIENT  Once         08/02/22 1705     Place sequential compression device  Until discontinued         08/02/22 1705                    Department Hospital Medicine  Former[facility]  Les Schrader MD  Date of service: 08/05/2022

## 2022-08-05 NOTE — PT/OT/SLP EVAL
Physical Therapy Evaluation    Patient Name:  Michelle Campo   MRN:  [id number]    Recommendations:     Discharge Recommendations:   (TBD)   Discharge Equipment Recommendations:  (TBD)   Barriers to discharge: None    Assessment:     Michelle Campo is a 87 y.o. female admitted with a medical diagnosis of Acute respiratory failure with hypercapnia.  She presents with the following impairments/functional limitations:  weakness, impaired endurance, impaired self care skills, impaired functional mobility, gait instability, impaired balance, decreased upper extremity function, decreased lower extremity function, impaired fine motor, impaired cardiopulmonary response to activity. Pt currently on ventilator and has difficulty managing secretions. Limited cognitive eval due to nonverbal but appears to follow simple commands.     Rehab Prognosis: Good; patient would benefit from acute skilled PT services to address these deficits and reach maximum level of function.    Recent Surgery: * No surgery found *      Plan:     During this hospitalization, patient to be seen 6 x/week to address the identified rehab impairments via gait training, therapeutic activities, therapeutic exercises and progress toward the following goals:    · Plan of Care Expires:   9/2/22    Subjective     Chief Complaint: unable to state  Patient/Family Comments/goals: get better  Pain/Comfort:  · Pain Rating 1:  (unable to state due to intubation)    Patients cultural, spiritual, Roman Catholic conflicts given the current situation: no    Living Environment:  Pt lives alone in single story home in [place]. 3 steps to enter with railings on each side  Prior to admission, patients level of function was mod I.  Equipment used at home: walker, rolling.  DME owned (not currently used): none.  Upon discharge, patient will have assistance from family.    Objective:     Communicated with nurse prior to session.  Patient found supine with blood pressure cuff, ozuna  The above information was copied from a provider's documentation of pre-arrival medical information as obtained. catheter, NG tube, peripheral IV, pulse ox (continuous), telemetry, ventilator  upon PT entry to room.    General Precautions: Standard, fall   Orthopedic Precautions:N/A   Braces: N/A  Respiratory Status: ventilator dependent    Exams:  · Sensation:    · -       Intact  · RLE Strength: Deficits: 2-/5 throughout  · LLE Strength: 2-/5 throughout    Functional Mobility:  · Bed Mobility:     · Scooting: total assistance  · Supine to Sit: maximal assistance  · Transfers:     · Sit to Stand:  moderate assistance with rolling walker  · Bed to Chair: maximal assistance with  rolling walker  using  Step Transfer    Therapeutic Activities and Exercises:  Positioned patient for comfort.     AM-PAC 6 CLICK MOBILITY  Total Score:11     Patient left up in chair with all lines intact, call button in reach and nurse and family present.    GOALS:   Multidisciplinary Problems     Physical Therapy Goals        Problem: Physical Therapy    Goal Priority Disciplines Outcome Goal Variances Interventions   Physical Therapy Goal     PT, PT/OT      Description: Goals to be met by: 22     Patient will increase functional independence with mobility by performin. Supine to sit with MInimal Assistance  2. Sit to stand transfer with Minimal Assistance  3. Bed to chair transfer with Minimal Assistance using Rolling Walker  4. Gait  x 100 feet with Minimal Assistance using Rolling Walker.                      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 2022    Dry eye syndrome     Glaucoma     H/O: pneumothorax 3/8/2013    Spontaneous in     Hyperlipidemia 3/8/2013    Hypertension 3/8/2013    Hypothyroidism 3/8/2013    Osteopenia 3/8/2013       Past Surgical History:   Procedure Laterality Date    CATARACT EXTRACTION W/  INTRAOCULAR LENS IMPLANT Bilateral n/a      Avrilar    HYSTERECTOMY      SELECTIVE LASER TRABECUPLASTY Bilateral 08/2017        TOTAL ABDOMINAL HYSTERECTOMY W/ BILATERAL SALPINGOOPHORECTOMY         Time Tracking:     PT Received On: 08/05/22  PT Start Time: 1230     PT Stop Time: 1245  PT Total Time (min): 15 min     Billable Minutes: Evaluation 5 min and Therapeutic Activity 10 min      08/05/2022

## 2022-08-05 NOTE — PLAN OF CARE
Treatment plan discussed with patient and family with time allowed for questions and answers     Problem: Adult Inpatient Plan of Care  Goal: Absence of Hospital-Acquired Illness or Injury  Outcome: Ongoing, Progressing-Safety maintained     Problem: Inability to Wean (Mechanical Ventilation, Invasive)  Goal: Mechanical Ventilation Liberation  Outcome: Ongoing, Progressing- Unable to wean off vent at present, MD aware.     Problem: Restraint, Nonbehavioral (Nonviolent)  Goal: Absence of Harm or Injury  Outcome: Ongoing, Progressing-Monitored and maintained as per hospital policy.

## 2022-08-05 NOTE — PROGRESS NOTES
Pulmonary/Critical Care Consult      PATIENT NAME: Michelle Campo  MRN: [id number]  TODAY'S DATE: 2022  7:50 AM  ADMIT DATE: 2022  AGE: 87 y.o. : 1935    CONSULT REQUESTED BY: Joselito Bull MD    REASON FOR CONSULT:   Critical care management    HPI:  The patient is an 87 year old female with failure to thrive. She was intubated for hypercapneic respiratory failure.  She also was thought to have a urinary tract infection but her urinalysis does not support this.  The patient has been declining for 2 weeks prior to the family bringing her in when she could no longer be aroused.  The patient is requiring Levophed to maintain a blood pressure.    I have spoken with the patient's daughter who states that at her baseline she is not eating well well she requires someone to bathe her and feed her because she has severe tremors.  She has been staying with her other daughter in [facility] because the [facility] daughter has COVID.  The [place] daughter told an oral in's daughter that she was not eating well and was not drinking boost and was moving less than less.     the patient is still requiring Levophed at 0.02.  She still is requiring assist-control ventilation to correct her respiratory acidosis.  She is awake and interactive but quite weak.  She is receiving enteral nutrition.  Her urine output is poor.    REVIEW OF SYSTEMS  Unobtainable    No change in the patient's Past Medical History, Past Surgical History, Social History or Family History since admission.        VITAL SIGNS (MOST RECENT)  Temp: 97 °F (36.1 °C) (22 0400)  Pulse: 82 (22)  Resp: (!) 26 (22)  BP: 137/71 (22)  SpO2: 100 % (22)    INTAKE AND OUTPUT (LAST 24 HOURS):    Intake/Output Summary (Last 24 hours) at 2022 0647  Last data filed at 2022 0607  Gross per 24 hour   Intake 4423.87 ml   Output 670 ml   Net 3753.87 ml       WEIGHT  Wt Readings from Last 1 Encounters:    08/05/22 58 kg (127 lb 13.9 oz)       PHYSICAL EXAM  GENERAL: Older patient in no distress, awake on the ventilator.  HEENT: Pupils equal and reactive. Extraocular movements intact. Nose intact. Pharynx intubated with 7.0 endotracheal tube and OG tube.  NECK: Supple.  Right IJ triple-lumen catheter.  HEART: Regular rate and rhythm. No murmur or gallop auscultated.  LUNGS: Clear to auscultation and percussion. Lung excursion symmetrical. No change in fremitus. No adventitial noises.  ABDOMEN: Bowel sounds present. Non-tender, no masses palpated.  : Normal anatomy.  Locke with minimal concentrated urine.  EXTREMITIES: Normal muscle tone and joint movement, no cyanosis or clubbing.   LYMPHATICS: No adenopathy palpated, there is edema to the right elbow..  SKIN: Dry, intact, no lesions.   NEURO:  Awake, cranial nerves appear intact.  The patient has a significant resting tremor to her left arm.  Motor strength is 5/5 in the upper extremities.  She is 5/5 but much weaker in her lower extremities.  She is able to nod/shake her head to questions.  PSYCH:  Quiet      CBC LAST (LAST 24 HOURS)  Recent Labs   Lab 08/05/22  0432 08/05/22  0446   WBC 5.13  --    RBC 3.62*  --    HGB 10.0*  --    HCT 33.1* 33*   MCV 91  --    MCH 27.6  --    MCHC 30.2*  --    RDW 15.9*  --      --    MPV 10.5  --        CHEMISTRY LAST (LAST 24 HOURS)  Recent Labs   Lab 08/05/22  0432 08/05/22  0446     --    K 4.4  --      --    CO2 32*  --    ANIONGAP 3*  --    BUN 20  --    CREATININE 0.8  --    *  --    CALCIUM 8.1*  --    PH  --  7.345*   MG 1.8  --        CARDIAC PROFILE (LAST 24 HOURS)  Recent Labs   Lab 08/02/22  1305 08/03/22  0400   BNP 1,258*  --    CPK 59  --    TROPONINI 0.031 0.034       LAST 7 DAYS MICROBIOLOGY   Microbiology Results (last 7 days)     Procedure Component Value Units Date/Time    Blood culture x two cultures. Draw prior to antibiotics. [[id number]] Collected: 08/02/22 1305    Order  Status: Completed Specimen: Blood from Peripheral, Antecubital, Left Updated: 08/04/22 1432     Blood Culture, Routine No Growth to date      No Growth to date      No Growth to date    Narrative:      Aerobic and anaerobic    Blood culture x two cultures. Draw prior to antibiotics. [[id number]] Collected: 08/02/22 1330    Order Status: Completed Specimen: Blood from Peripheral, Forearm, Left Updated: 08/04/22 1432     Blood Culture, Routine No Growth to date      No Growth to date      No Growth to date    Narrative:      Aerobic and anaerobic    Culture, Respiratory with Gram Stain [[id number]] Collected: 08/02/22 1420    Order Status: Completed Specimen: Respiratory from Tracheal Aspirate Updated: 08/04/22 0821     Respiratory Culture Normal respiratory tru     Gram Stain (Respiratory) <10 epithelial cells per low power field.     Gram Stain (Respiratory) Moderate WBC's     Gram Stain (Respiratory) Many Gram positive cocci     Gram Stain (Respiratory) Few Gram negative rods     Gram Stain (Respiratory) Few Gram negative coccobacilli    Gram Stain, Respiratory [[id number]]     Order Status: Canceled Specimen: Respiratory     Culture, Respiratory with Gram Stain [[id number]] Collected: 08/02/22 1420    Order Status: Canceled Specimen: Sputum, Expectorated           MOST RECENT IMAGING  X-Ray Chest AP Portable  Narrative: EXAMINATION:  XR CHEST AP PORTABLE    CLINICAL HISTORY:  intubated;  acute respiratory failure with hypercapnia    FINDINGS:  Portable chest at 504 compared with 08/03/2022 shows unchanged support tubes and lines and cardiomediastinal silhouette.    Biapical pleuroparenchymal opacities unchanged.  Lungs show unchanged mild diffuse interstitial opacities without new confluent alveolar consolidation, pleural effusion, or pneumothorax.  Pulmonary vasculature is normal. No acute osseous abnormality.  Impression: No significant change.    Electronically signed by: Ethan Minor  MD  Date:    08/04/2022  Time:    06:33      CURRENT VISIT EKG  Results for orders placed or performed during the hospital encounter of 08/02/22   EKG 12-lead    Narrative    Test Reason : R00.1,    Vent. Rate : 048 BPM     Atrial Rate : 048 BPM     P-R Int : 140 ms          QRS Dur : 078 ms      QT Int : 588 ms       P-R-T Axes : 073 057 261 degrees     QTc Int : 525 ms    Sinus bradycardia  Possible Left atrial enlargement  T wave abnormality, consider inferior ischemia  T wave abnormality, consider anterolateral ischemia  Prolonged QT  Abnormal ECG  When compared with ECG of 02-AUG-2022 13:54,  Significant changes have occurred    Referred By: [email or web address]   SELF           Confirmed By:        ECHOCARDIOGRAM RESULTS  No results found for this or any previous visit.        VENTILATOR INFORMATION  Vent Mode: A/C  Oxygen Concentration (%):  [28-40] 40  Resp Rate Total:  [14 br/min-28 br/min] 19 br/min  Vt Set:  [400 mL] 400 mL  PEEP/CPAP:  [5 cmH20] 5 cmH20  Pressure Support:  [0 cmH20-15 cmH20] 0 cmH20  Mean Airway Pressure:  [9.1 lkB75-88 cmH20] 11 cmH20       LAST ARTERIAL BLOOD GAS  ABG on pressure support of 15, peep of 5  Recent Labs   Lab 08/05/22  0446   PH 7.345*   PO2 54*   PCO2 56.0*   HCO3 30.5*   BE 5       IMPRESSION AND PLAN  Failure to thrive, advanced age   Acute on chronic hypercapnic respiratory failure requiring mechanical ventilation  Shock, etiology unclear other than end-stage life, volume now high and off Levophed  COPD  Hypokalemia, improving  Hypomagnesemia, corrected  Hypophophotemia, corrected  Anemia  Oliguria slightly improved, patient 8 L ahead    Minimal sedation  Up to cardiac chair  Replace electrolytes  Continue enteral nutrition  Enoxaparin for DVT prophylaxis  Pantoprazole for GI prophylaxis  Continue spontaneous breathing trials each morning  Stop IV fluids    Critical care time spent reviewing the chart, examining the patient, reviewing the labs, reviewing the radiological  findings, discussing care with nursing, physicians, and respiratory and creating the note and  has been greater than 35 minutes    Brianna Grey MD  [facility]  Department of Pulmonology  Date of Service: 08/05/2022  7:50 AM

## 2022-08-05 NOTE — CARE UPDATE
Continue ventilator with tx   08/05/22 0717   Patient Assessment/Suction   Level of Consciousness (AVPU) alert   Respiratory Effort Normal;Unlabored   Expansion/Accessory Muscles/Retractions no use of accessory muscles;expansion symmetric   All Lung Fields Breath Sounds clear;diminished   SUNITA Breath Sounds clear   LLL Breath Sounds diminished   RUL Breath Sounds clear   RML Breath Sounds diminished   RLL Breath Sounds diminished   Rhythm/Pattern, Respiratory assisted mechanically   Cough Frequency with stimulation   Cough Type assisted   Suction Method tracheal   $ Suction Charges Inline Suction Procedure Stat Charge   Secretions Amount small   Secretions Color yellow   Secretions Characteristics thick   Skin Integrity   $ Wound Care Tech Time 15 min   Area Observed Upper lip;Lower lip;Corner lip   Skin Appearance without discoloration   PRE-TX-O2   O2 Device (Oxygen Therapy) ventilator   $ Is the patient on Low Flow Oxygen? Yes   SpO2 100 %   Pulse Oximetry Type Continuous   $ Pulse Oximetry - Multiple Charge Pulse Oximetry - Multiple   Pulse 82   Resp 17   Aerosol Therapy   $ Aerosol Therapy Charges Aerosol Treatment   Daily Review of Necessity (SVN) completed   Respiratory Treatment Status (SVN) given   Treatment Route (SVN) in-line;ventilator   Patient Position (SVN) semi-Munson's   Post Treatment Assessment (SVN) increased aeration   Signs of Intolerance (SVN) none   Breath Sounds Post-Respiratory Treatment   Throughout All Fields Post-Treatment All Fields   Throughout All Fields Post-Treatment aeration increased   Post-treatment Heart Rate (beats/min) 97   Post-treatment Resp Rate (breaths/min) 22        Airway - Non-Surgical 08/02/22 1315 Endotracheal Tube   Placement Date/Time: 08/02/22 1315   Method of Intubation: Glidescope;Video Laryngoscopy  Inserted by: MD  Staff/Resident Name(s): Negrita  Airway Device: Endotracheal Tube  Airway Device Size: 7.0  Style: Cuffed  Cuff Inflated With: Air  Placement Veri...    Secured at 23 cm   Measured At Lips   Secured Location Right   Secured by Commercial tube chou   Tube Securement Device Changed? Yes   Bite Block none   Site Condition Cool;Dry   Status Intact;Secured;Patent   Site Assessment Clean;Dry;No bleeding   Airway Safety   Ambu bag with the patient? Yes, Adult Ambu   Is mask with the patient? Yes, Adult Mask   ETT Size 7   Vent Select   $ Ventilator Subsequent 1   Charged w/in last 24h YES   Preset Conventional Ventilator Settings   Vent ID [id number]   Vent Type    Humidity HME   Education   $ Education Ventilator Oxygen;15 min   Respiratory Evaluation   $ Care Plan Tech Time 15 min   $ Eval/Re-eval Charges Re-evaluation

## 2022-08-06 ENCOUNTER — CLINICAL SUPPORT (OUTPATIENT)
Dept: CARDIOLOGY | Facility: HOSPITAL | Age: 87
DRG: 208 | End: 2022-08-06
Attending: INTERNAL MEDICINE
Payer: MEDICARE

## 2022-08-06 LAB
ANION GAP SERPL CALC-SCNC: 6 MMOL/L (ref 8–16)
AV INDEX (PROSTH): 0.57
AV MEAN GRADIENT: 9 MMHG
AV VALVE AREA: 1.71 CM2
BASOPHILS # BLD AUTO: 0.02 K/UL (ref 0–0.2)
BASOPHILS NFR BLD: 0.5 % (ref 0–1.9)
BNP SERPL-MCNC: 874 PG/ML (ref 0–99)
BSA FOR ECHO PROCEDURE: 1.43 M2
BUN SERPL-MCNC: 15 MG/DL (ref 8–23)
CALCIUM SERPL-MCNC: 8.5 MG/DL (ref 8.7–10.5)
CHLORIDE SERPL-SCNC: 104 MMOL/L (ref 95–110)
CO2 SERPL-SCNC: 32 MMOL/L (ref 23–29)
CREAT SERPL-MCNC: 0.7 MG/DL (ref 0.5–1.4)
CV ECHO LV RWT: 0.46 CM
DIFFERENTIAL METHOD: ABNORMAL
DOP CALC AO VTI: 34.99 CM
DOP CALC LVOT AREA: 3 CM2
DOP CALC LVOT DIAMETER: 1.95 CM
DOP CALC LVOT PEAK VEL: 109.51 M/S
DOP CALC LVOT STROKE VOLUME: 59.67 CM3
DOP CALCLVOT PEAK VEL VTI: 19.99 CM
E WAVE DECELERATION TIME: 385.38 MSEC
E/A RATIO: 0.73
E/E' RATIO: 24.44 M/S
ECHO LV POSTERIOR WALL: 0.69 CM (ref 0.6–1.1)
EJECTION FRACTION: 65 %
EOSINOPHIL # BLD AUTO: 0.2 K/UL (ref 0–0.5)
EOSINOPHIL NFR BLD: 5.6 % (ref 0–8)
ERYTHROCYTE [DISTWIDTH] IN BLOOD BY AUTOMATED COUNT: 15.9 % (ref 11.5–14.5)
EST. GFR  (NO RACE VARIABLE): >60 ML/MIN/1.73 M^2
FRACTIONAL SHORTENING: 22 % (ref 28–44)
GLUCOSE SERPL-MCNC: 70 MG/DL (ref 70–110)
HCT VFR BLD AUTO: 34.2 % (ref 37–48.5)
HGB BLD-MCNC: 10.3 G/DL (ref 12–16)
IMM GRANULOCYTES # BLD AUTO: 0.01 K/UL (ref 0–0.04)
IMM GRANULOCYTES NFR BLD AUTO: 0.3 % (ref 0–0.5)
INTERVENTRICULAR SEPTUM: 0.89 CM (ref 0.6–1.1)
IVC DIAMETER: 2.4 CM
LEFT ATRIUM SIZE: 4.26 CM
LEFT INTERNAL DIMENSION IN SYSTOLE: 2.33 CM (ref 2.1–4)
LEFT VENTRICLE DIASTOLIC VOLUME INDEX: 16.33 ML/M2
LEFT VENTRICLE DIASTOLIC VOLUME: 26.13 ML
LEFT VENTRICLE MASS INDEX: 36 G/M2
LEFT VENTRICLE SYSTOLIC VOLUME INDEX: 7.9 ML/M2
LEFT VENTRICLE SYSTOLIC VOLUME: 12.58 ML
LEFT VENTRICULAR INTERNAL DIMENSION IN DIASTOLE: 2.97 CM (ref 3.5–6)
LEFT VENTRICULAR MASS: 57.17 G
LV LATERAL E/E' RATIO: 18.33 M/S
LV SEPTAL E/E' RATIO: 36.67 M/S
LYMPHOCYTES # BLD AUTO: 0.7 K/UL (ref 1–4.8)
LYMPHOCYTES NFR BLD: 19.3 % (ref 18–48)
MCH RBC QN AUTO: 27.7 PG (ref 27–31)
MCHC RBC AUTO-ENTMCNC: 30.1 G/DL (ref 32–36)
MCV RBC AUTO: 92 FL (ref 82–98)
MONOCYTES # BLD AUTO: 0.4 K/UL (ref 0.3–1)
MONOCYTES NFR BLD: 9.3 % (ref 4–15)
MV PEAK A VEL: 1.51 M/S
MV PEAK E VEL: 1.1 M/S
NEUTROPHILS # BLD AUTO: 2.5 K/UL (ref 1.8–7.7)
NEUTROPHILS NFR BLD: 65 % (ref 38–73)
NRBC BLD-RTO: 0 /100 WBC
PHOSPHATE SERPL-MCNC: 3.1 MG/DL (ref 2.7–4.5)
PISA TR MAX VEL: 4.12 M/S
PLATELET # BLD AUTO: 165 K/UL (ref 150–450)
PMV BLD AUTO: 10.4 FL (ref 9.2–12.9)
POTASSIUM SERPL-SCNC: 4.3 MMOL/L (ref 3.5–5.1)
RA PRESSURE: 8 MMHG
RBC # BLD AUTO: 3.72 M/UL (ref 4–5.4)
RIGHT VENTRICULAR END-DIASTOLIC DIMENSION: 2.32 CM
SODIUM SERPL-SCNC: 142 MMOL/L (ref 136–145)
TDI LATERAL: 0.06 M/S
TDI SEPTAL: 0.03 M/S
TDI: 0.05 M/S
TR MAX PG: 68 MMHG
TV REST PULMONARY ARTERY PRESSURE: 76 MMHG
WBC # BLD AUTO: 3.78 K/UL (ref 3.9–12.7)

## 2022-08-06 PROCEDURE — 25000003 PHARM REV CODE 250

## 2022-08-06 PROCEDURE — 20000000 HC ICU ROOM

## 2022-08-06 PROCEDURE — 92610 EVALUATE SWALLOWING FUNCTION: CPT

## 2022-08-06 PROCEDURE — 97167 OT EVAL HIGH COMPLEX 60 MIN: CPT

## 2022-08-06 PROCEDURE — 36415 COLL VENOUS BLD VENIPUNCTURE: CPT | Performed by: HOSPITALIST

## 2022-08-06 PROCEDURE — 94660 CPAP INITIATION&MGMT: CPT

## 2022-08-06 PROCEDURE — 93306 TTE W/DOPPLER COMPLETE: CPT | Mod: 26,,, | Performed by: INTERNAL MEDICINE

## 2022-08-06 PROCEDURE — 83880 ASSAY OF NATRIURETIC PEPTIDE: CPT | Performed by: HOSPITALIST

## 2022-08-06 PROCEDURE — 97535 SELF CARE MNGMENT TRAINING: CPT

## 2022-08-06 PROCEDURE — 25000003 PHARM REV CODE 250: Performed by: INTERNAL MEDICINE

## 2022-08-06 PROCEDURE — 99900031 HC PATIENT EDUCATION (STAT)

## 2022-08-06 PROCEDURE — 94640 AIRWAY INHALATION TREATMENT: CPT

## 2022-08-06 PROCEDURE — 99900035 HC TECH TIME PER 15 MIN (STAT)

## 2022-08-06 PROCEDURE — 27000221 HC OXYGEN, UP TO 24 HOURS

## 2022-08-06 PROCEDURE — 99233 SBSQ HOSP IP/OBS HIGH 50: CPT | Mod: ,,, | Performed by: INTERNAL MEDICINE

## 2022-08-06 PROCEDURE — 25000242 PHARM REV CODE 250 ALT 637 W/ HCPCS: Performed by: INTERNAL MEDICINE

## 2022-08-06 PROCEDURE — 63600175 PHARM REV CODE 636 W HCPCS: Performed by: INTERNAL MEDICINE

## 2022-08-06 PROCEDURE — 93306 TTE W/DOPPLER COMPLETE: CPT

## 2022-08-06 PROCEDURE — 84100 ASSAY OF PHOSPHORUS: CPT | Performed by: INTERNAL MEDICINE

## 2022-08-06 PROCEDURE — 80048 BASIC METABOLIC PNL TOTAL CA: CPT | Performed by: INTERNAL MEDICINE

## 2022-08-06 PROCEDURE — 85025 COMPLETE CBC W/AUTO DIFF WBC: CPT | Performed by: INTERNAL MEDICINE

## 2022-08-06 PROCEDURE — 94761 N-INVAS EAR/PLS OXIMETRY MLT: CPT

## 2022-08-06 PROCEDURE — 97530 THERAPEUTIC ACTIVITIES: CPT

## 2022-08-06 PROCEDURE — 93306 ECHO (CUPID ONLY): ICD-10-PCS | Mod: 26,,, | Performed by: INTERNAL MEDICINE

## 2022-08-06 PROCEDURE — 99233 PR SUBSEQUENT HOSPITAL CARE,LEVL III: ICD-10-PCS | Mod: ,,, | Performed by: INTERNAL MEDICINE

## 2022-08-06 PROCEDURE — 97166 OT EVAL MOD COMPLEX 45 MIN: CPT

## 2022-08-06 RX ADMIN — CHLORHEXIDINE GLUCONATE 15 ML: 1.2 RINSE ORAL at 08:08

## 2022-08-06 RX ADMIN — MUPIROCIN 1 G: 20 OINTMENT TOPICAL at 08:08

## 2022-08-06 RX ADMIN — LATANOPROST 1 DROP: 50 SOLUTION OPHTHALMIC at 09:08

## 2022-08-06 RX ADMIN — LEVOTHYROXINE SODIUM 112 MCG: 0.11 TABLET ORAL at 06:08

## 2022-08-06 RX ADMIN — DORZOLAMIDE HYDROCHLORIDE 1 DROP: 20 SOLUTION/ DROPS OPHTHALMIC at 04:08

## 2022-08-06 RX ADMIN — ENOXAPARIN SODIUM 30 MG: 30 INJECTION SUBCUTANEOUS at 04:08

## 2022-08-06 RX ADMIN — IPRATROPIUM BROMIDE AND ALBUTEROL SULFATE 3 ML: .5; 3 SOLUTION RESPIRATORY (INHALATION) at 07:08

## 2022-08-06 RX ADMIN — MUPIROCIN 1 G: 20 OINTMENT TOPICAL at 09:08

## 2022-08-06 RX ADMIN — DORZOLAMIDE HYDROCHLORIDE 1 DROP: 20 SOLUTION/ DROPS OPHTHALMIC at 09:08

## 2022-08-06 RX ADMIN — FAMOTIDINE 20 MG: 20 TABLET ORAL at 08:08

## 2022-08-06 RX ADMIN — CHLORHEXIDINE GLUCONATE 15 ML: 1.2 RINSE ORAL at 09:08

## 2022-08-06 RX ADMIN — IPRATROPIUM BROMIDE AND ALBUTEROL SULFATE 3 ML: .5; 3 SOLUTION RESPIRATORY (INHALATION) at 11:08

## 2022-08-06 RX ADMIN — DORZOLAMIDE HYDROCHLORIDE 1 DROP: 20 SOLUTION/ DROPS OPHTHALMIC at 08:08

## 2022-08-06 NOTE — PLAN OF CARE
08/05/22 2305   Patient Assessment/Suction   Level of Consciousness (AVPU) responds to voice   Respiratory Effort Unlabored   Expansion/Accessory Muscles/Retractions expansion symmetric   All Lung Fields Breath Sounds coarse   Rhythm/Pattern, Respiratory assisted mechanically   Cough Frequency infrequent   Cough Type fair;nonproductive   PRE-TX-O2   O2 Device (Oxygen Therapy) BiPAP   $ Is the patient on Low Flow Oxygen? Yes   Oxygen Concentration (%) 32   SpO2 (!) 93 %   Pulse Oximetry Type Continuous   Pulse 83   Resp (!) 22   Aerosol Therapy   $ Aerosol Therapy Charges Aerosol Treatment   Daily Review of Necessity (SVN) completed   Respiratory Treatment Status (SVN) given   Treatment Route (SVN) in-line   Patient Position (SVN) HOB elevated   Post Treatment Assessment (SVN) breath sounds unchanged   Signs of Intolerance (SVN) none   Breath Sounds Post-Respiratory Treatment   Throughout All Fields Post-Treatment All Fields   Throughout All Fields Post-Treatment no change   Post-treatment Heart Rate (beats/min) 88   Post-treatment Resp Rate (breaths/min) 22   Ready to Wean/Extubation Screen   FIO2<=50 (chart decimal) 0.32   Preset CPAP/BiPAP Settings   Mode Of Delivery BiPAP   $ Is patient using? Yes   Size of Mask Small/Medium   Sized Appropriately? Yes   Equipment Type V60   Airway Device Type small nasal mask   CPAP (cm H2O) 12   Ipap 6   Set Rate (Breaths/Min) 20   ITime (sec) 1   Rise Time (sec) 3   Patient CPAP/BiPAP Settings   RR Total (Breaths/Min) 22   Tidal Volume (mL) 385   VE Minute Ventilation (L/min) 9 L/min   Peak Inspiratory Pressure (cm H2O) 12   TiTOT (%) 32   Total Leak (L/Min) 22   Patient Trigger - ST Mode Only (%) 38   CPAP/BiPAP Alarms   High Pressure (cm H2O) 45   Low Pressure (cm H2O) 10   Minute Ventilation (L/Min) 5   High RR (breaths/min) 45   Apnea (Sec) 20   Education   $ Education Bronchodilator;15 min

## 2022-08-06 NOTE — PT/OT/SLP EVAL
Occupational Therapy   Evaluation    Name: Michelle Campo  MRN: [id number]  Admitting Diagnosis:  Acute respiratory failure with hypercapnia  Recent Surgery: * No surgery found *      Recommendations:     Discharge Recommendations:  (TBD)  Discharge Equipment Recommendations:   (TBD)  Barriers to discharge:   (increased assist with ADLs and mobility)    Assessment:     Michelle Campo is a 87 y.o. female with a medical diagnosis of Acute respiratory failure with hypercapnia.  Pt agreeable to OT evaluation this AM. Performance deficits affecting function: weakness, impaired endurance, impaired self care skills, impaired functional mobility, gait instability, impaired balance, decreased coordination, decreased upper extremity function, decreased lower extremity function, decreased safety awareness, impaired cardiopulmonary response to activity.  No family present; Pt nodded yes/no during session, but pt did not speak.    Rehab Prognosis: Fair; patient would benefit from acute skilled OT services to address these deficits and reach maximum level of function.       Plan:     Patient to be seen 5 x/week to address the above listed problems via self-care/home management, therapeutic activities, therapeutic exercises  · Plan of Care Expires: 09/06/22  · Plan of Care Reviewed with: patient    Subjective     Chief Complaint: none stated  Patient/Family Comments/goals: none stated    Occupational Profile: History obtained from chart review; pt unable to provide history and family not present  Living Environment: Pt lives alone in a 1 story home with 3 steps to enter.  Previous level of function: Mod I   Roles and Routines: primary homemaker  Equipment Used at Home:  walker, rolling  Assistance upon Discharge: yes, from facility     Pain/Comfort:  · Pain Rating 1: 0/10    Patients cultural, spiritual, Roman [employer] conflicts given the current situation:      Objective:     Communicated with: nursing prior to session.  Patient found HOB  elevated with blood pressure cuff, ozuna catheter, peripheral IV, pulse ox (continuous), telemetry, oxygen upon OT entry to room.    General Precautions: Standard, fall   Orthopedic Precautions:N/A   Braces: N/A  Respiratory Status: Nasal cannula, flow 4 L/min    Occupational Performance:    Bed Mobility:    · Patient completed Supine to Sit with maximal assistance    Functional Mobility/Transfers:  · Patient completed Bed <> Chair Transfer using Stand Pivot technique with moderate assistance and maximal assistance with no assistive device    Activities of Daily Living:  · Grooming: setup assistance seated in chair     · Lower Body Dressing: total assistance to don socks  · Toileting: ozuna      Cognitive/Visual Perceptual:  Cognitive/Psychosocial Skills:  -       Follows Commands/attention:Follows one-step commands  -       Communication: pt did not speak throughout session  -       Memory: GIA  -       Safety awareness/insight to disability: impaired   -       Mood/Affect/Coping skills/emotional control: Cooperative and Flat affect    Physical Exam:  Balance:    -       SBA seated balance; Mod A standing balance  Upper Extremity Range of Motion:     -       Right Upper Extremity: WFL  -       Left Upper Extremity: WFL  Upper Extremity Strength:    -       Right Upper Extremity: 3+/5 - 4/5 grossly  -       Left Upper Extremity: 3+/5 - 4/5 grossly   Strength:    -       Right Upper Extremity: poor   -       Left Upper Extremity: poor   Gross motor coordination:   WFL    AMPAC 6 Click ADL:  AMPAC Total Score: 14    Treatment & Education:  Pt educated on role of OT/POC, importance of OOB/EOB activity, use of call bell, and safety during ADLs, transfers, and functional mobility.  Education:    Patient left up in chair with all lines intact, call button in reach and RN present    GOALS:   Multidisciplinary Problems     Occupational Therapy Goals        Problem: Occupational Therapy    Goal Priority  Disciplines Outcome Interventions   Occupational Therapy Goal     OT, PT/OT     Description: Goals to be met by: 9/6/22    Patient will increase functional independence with ADLs by performing:    UE Dressing with Supervision.  LE Dressing with Supervision.  Grooming while seated with Supervision.  Toileting from bedside commode with Minimal Assistance for hygiene and clothing management.   Toilet transfer to bedside commode with Minimal Assistance.  Upper extremity exercise program x10 reps per handout, with supervision.                     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 5/27/2022    Dry eye syndrome     Glaucoma     H/O: pneumothorax 3/8/2013    Spontaneous in 2007    Hyperlipidemia 3/8/2013    Hypertension 3/8/2013    Hypothyroidism 3/8/2013    Osteopenia 3/8/2013       Past Surgical History:   Procedure Laterality Date    CATARACT EXTRACTION W/  INTRAOCULAR LENS IMPLANT Bilateral n/a    Dr. Carias    HYSTERECTOMY      SELECTIVE LASER TRABECUPLASTY Bilateral 08/2017        TOTAL ABDOMINAL HYSTERECTOMY W/ BILATERAL SALPINGOOPHORECTOMY         Time Tracking:     OT Date of Treatment: 08/06/22  OT Start Time: 0834  OT Stop Time: 0851  OT Total Time (min): 17 min    Billable Minutes:Evaluation 9  Self Care/Home Management 8    8/6/2022

## 2022-08-06 NOTE — PLAN OF CARE
Treatment plan discussed with patient and family with time allowed for questions and answers     Problem: Adult Inpatient Plan of Care  Goal: Absence of Hospital-Acquired Illness or Injury  Outcome: Ongoing, Progressing-Safety maintained     Problem: Renal Function Impairment (Acute Kidney Injury/Impairment)  Goal: Effective Renal Function  Outcome: Ongoing, Progressing- Output improved this shift     Problem: Noninvasive Ventilation Acute  Goal: Effective Unassisted Ventilation and Oxygenation  Outcome: Ongoing, Progressing-Tolerates BiPap fair, needs frequent reminders to not interfere with the mask.

## 2022-08-06 NOTE — PROGRESS NOTES
Pulmonary/Critical Care  Progress Note      PATIENT NAME: Michelle Campo  MRN: [id number]  TODAY'S DATE: 2022  7:50 AM  ADMIT DATE: 2022  AGE: 87 y.o. : 1935    CONSULT REQUESTED BY: Les Schrader MD    REASON FOR CONSULT:   Critical care management    HPI:  The patient is an 87 year old female with failure to thrive. She was intubated for hypercapneic respiratory failure.  She also was thought to have a urinary tract infection but her urinalysis does not support this.  The patient has been declining for 2 weeks prior to the family bringing her in when she could no longer be aroused.  The patient is requiring Levophed to maintain a blood pressure.    I have spoken with the patient's daughter who states that at her baseline she is not eating well well she requires someone to bathe her and feed her because she has severe tremors.  She has been staying with her other daughter in [facility] because the [facility] daughter has COVID.  The [place] daughter told an oral in's daughter that she was not eating well and was not drinking boost and was moving less than less.     the patient is still requiring Levophed at 0.02.  She still is requiring assist-control ventilation to correct her respiratory acidosis.  She is awake and interactive but quite weak.  She is receiving enteral nutrition.  Her urine output is poor.    2022 - Was extubated yesterday and is stable but remains very weak.  She is not very talkative.    REVIEW OF SYSTEMS  Unobtainable    No change in the patient's Past Medical History, Past Surgical History, Social History or Family History since admission.        VITAL SIGNS (MOST RECENT)  Temp: 97.6 °F (36.4 °C) (22 0400)  Pulse: 75 (22 0703)  Resp: (!) 21 (22 07)  BP: 134/71 (22 0430)  SpO2: 95 % (22 07)    INTAKE AND OUTPUT (LAST 24 HOURS):    Intake/Output Summary (Last 24 hours) at 2022 0821  Last data filed at 2022 0600  Gross per 24  hour   Intake 214.59 ml   Output 850 ml   Net -635.41 ml       WEIGHT  Wt Readings from Last 1 Encounters:   08/06/22 56.8 kg (125 lb 3.5 oz)       PHYSICAL EXAM    GENERAL: Older patient in no distress,extubated  HEENT: Pupils equal and reactive. Extraocular movements intact. Nose intact.   NECK: Supple.  Right IJ triple-lumen catheter.  HEART: Regular rate and rhythm. + m or gallop auscultated.  LUNGS: Clear to auscultation and percussion. Lung excursion symmetrical. No change in fremitus. No adventitial noises. Shallow and decreased a bases  ABDOMEN: Bowel sounds present. Non-tender, no masses palpated.  : Normal anatomy.  Locke with minimal concentrated urine.  EXTREMITIES: Normal muscle tone and joint movement, no cyanosis or clubbing.   LYMPHATICS: No adenopathy palpated, there is edema to the right elbow..  SKIN: Dry, intact, no lesions.   NEURO:  Awake, cranial nerves appear intact.  The patient has a significant resting tremor to her left arm.  Motor strength is 5/5 in the upper extremities.  She is 5/5 but much weaker in her lower extremities.  She is responsive  PSYCH:  Quiet      CBC LAST (LAST 24 HOURS)  Recent Labs   Lab 08/06/22  0440   WBC 3.78*   RBC 3.72*   HGB 10.3*   HCT 34.2*   MCV 92   MCH 27.7   MCHC 30.1*   RDW 15.9*      MPV 10.4   GRAN 65.0  2.5   LYMPH 19.3  0.7*   MONO 9.3  0.4   BASO 0.02   NRBC 0       CHEMISTRY LAST (LAST 24 HOURS)  Recent Labs   Lab 08/05/22  1614 08/06/22  0440   NA  --  142   K  --  4.3   CL  --  104   CO2  --  32*   ANIONGAP  --  6*   BUN  --  15   CREATININE  --  0.7   GLU  --  70   CALCIUM  --  8.5*   PH 7.298*  --        CARDIAC PROFILE (LAST 24 HOURS)  Recent Labs   Lab 08/02/22  1305 08/03/22  0400   BNP 1,258*  --    CPK 59  --    TROPONINI 0.031 0.034       LAST 7 DAYS MICROBIOLOGY   Microbiology Results (last 7 days)     Procedure Component Value Units Date/Time    Blood culture x two cultures. Draw prior to antibiotics. [[id number]] Collected:  08/02/22 1305    Order Status: Completed Specimen: Blood from Peripheral, Antecubital, Left Updated: 08/05/22 1432     Blood Culture, Routine No Growth to date      No Growth to date      No Growth to date      No Growth to date    Narrative:      Aerobic and anaerobic    Blood culture x two cultures. Draw prior to antibiotics. [[id number]] Collected: 08/02/22 1330    Order Status: Completed Specimen: Blood from Peripheral, Forearm, Left Updated: 08/05/22 1432     Blood Culture, Routine No Growth to date      No Growth to date      No Growth to date      No Growth to date    Narrative:      Aerobic and anaerobic    Culture, Respiratory with Gram Stain [[id number]] Collected: 08/02/22 1420    Order Status: Completed Specimen: Respiratory from Tracheal Aspirate Updated: 08/04/22 0821     Respiratory Culture Normal respiratory tru     Gram Stain (Respiratory) <10 epithelial cells per low power field.     Gram Stain (Respiratory) Moderate WBC's     Gram Stain (Respiratory) Many Gram positive cocci     Gram Stain (Respiratory) Few Gram negative rods     Gram Stain (Respiratory) Few Gram negative coccobacilli    Gram Stain, Respiratory [[id number]]     Order Status: Canceled Specimen: Respiratory     Culture, Respiratory with Gram Stain [[id number]] Collected: 08/02/22 1420    Order Status: Canceled Specimen: Sputum, Expectorated           MOST RECENT IMAGING  X-Ray Chest 1 View  HISTORY: Endotracheal tube.    FINDINGS: Portable chest radiograph at 0512 hours compared to 08/04/2022 shows ET and NG tubes, and right internal jugular central venous catheter, all unchanged in position. The cardiomediastinal silhouette and pulmonary vasculature are stable.    The lungs are symmetrically expanded, with no new pleural or parenchymal abnormality. No pneumothorax.    IMPRESSION: No significant interval change.    Electronically signed by:  Herrera Luis MD  8/5/2022 7:06 AM CDT Workstation: [id number]      CURRENT VISIT EKG  Results  for orders placed or performed during the hospital encounter of 08/02/22   EKG 12-lead    Narrative    Test Reason : R00.1,    Vent. Rate : 048 BPM     Atrial Rate : 048 BPM     P-R Int : 140 ms          QRS Dur : 078 ms      QT Int : 588 ms       P-R-T Axes : 073 057 261 degrees     QTc Int : 525 ms    Sinus bradycardia  Possible Left atrial enlargement  T wave abnormality, consider inferior ischemia  T wave abnormality, consider anterolateral ischemia  Prolonged QT  Abnormal ECG  When compared with ECG of 02-AUG-2022 13:54,  Significant changes have occurred    Referred By: [email or web address]   SELF           Confirmed By:        ECHOCARDIOGRAM RESULTS  No results found for this or any previous visit.        VENTILATOR INFORMATION  Vent Mode: Spont  Oxygen Concentration (%):  [30-40] 32  Resp Rate Total:  [15 br/min-26 br/min] 16 br/min  Vt Set:  [400 mL] 400 mL  PEEP/CPAP:  [5 cmH20] 5 cmH20  Pressure Support:  [0 cmH20-10 cmH20] 10 cmH20  Mean Airway Pressure:  [8.1 ieE21-08 cmH20] 14 cmH20       LAST ARTERIAL BLOOD GAS  ABG on pressure support of 15, peep of 5  Recent Labs   Lab 08/05/22  1614   PH 7.298*   PO2 99   PCO2 65.0*   HCO3 31.9*   BE 5       IMPRESSION AND PLAN  Failure to thrive, advanced age   Acute on chronic hypercapnic respiratory failure requiring mechanical ventilation  Shock, etiology unclear other than end-stage life, volume now high and off Levophed  COPD  Hypokalemia, improving  Hypomagnesemia, corrected  Hypophophotemia, corrected  Anemia  Oliguria slightly improved, patient 8 L ahead    Stable extubated but frail and could decompensate  Up to cardiac chair  Replace electrolytes  Enoxaparin for DVT prophylaxis  Pantoprazole for GI prophylaxis  Check ECHO to assess murmur - ? Does she have AoS  Tried to call daughter to discuss code status but no answer      Paul Wilson MD  [facility] Pulmonary/Critical Care  08/06/2022

## 2022-08-06 NOTE — PT/OT/SLP PROGRESS
Physical Therapy Treatment    Patient Name:  Michelle Campo   MRN:  [id number]    Recommendations:     Discharge Recommendations:   (TBD)   Discharge Equipment Recommendations:  (TBD)   Barriers to discharge: None    Assessment:     Michelle Campo is a 87 y.o. female admitted with a medical diagnosis of Acute respiratory failure with hypercapnia.  She presents with the following impairments/functional limitations:  weakness, gait instability, impaired functional mobility, impaired cardiopulmonary response to activity, Patient has good  potential fo ambulation..    Rehab Prognosis: Good; patient would benefit from acute skilled PT services to address these deficits and reach maximum level of function.    Recent Surgery: * No surgery found *      Plan:     During this hospitalization, patient to be seen 6 x/week to address the identified rehab impairments via gait training, therapeutic activities, therapeutic exercises and progress toward the following goals:    · Plan of Care Expires:       Subjective     Chief Complaint: no subjective complaints  Patient/Family Comments/goals: tired   Pain/Comfort:  · Pain Rating 1: 0/10      Objective:     Communicated with nursing staff prior to session.  Patient found supine with oxygen, pulse ox (continuous), ozuna catheter, telemetry, blood pressure cuff upon PT entry to room.     General Precautions: Standard, fall   Orthopedic Precautions:N/A   Braces: N/A  Respiratory Status: Nasal cannula, flow 4 L/min     Functional Mobility:  · Bed Mobility:     · Rolling Left:  minimum assistance  · Supine to Sit: minimum assistance  · Sit to Supine: minimum assistance  · Transfers:     · Sit to Stand:  minimum assistance and moderate assistance with hand-held assist  · Balance: good sitting balance, poor standing-requires hand held assist      Therapeutic Activities and Exercises:   Patient seen for therapeutic activities. Bed mobility, sitting balance, sit to stand and standing tolerance  with weight shifts and pre-gait marching in place. Unable to sustain lifting alternate  leg continuously.    Patient left supine with all lines intact, call button in reach and family & RT present..    GOALS:   Multidisciplinary Problems     Physical Therapy Goals        Problem: Physical Therapy    Goal Priority Disciplines Outcome Goal Variances Interventions   Physical Therapy Goal     PT, PT/OT Ongoing, Progressing     Description: Goals to be met by: 22     Patient will increase functional independence with mobility by performin. Supine to sit with MInimal Assistance  2. Sit to stand transfer with Minimal Assistance  3. Bed to chair transfer with Minimal Assistance using Rolling Walker  4. Gait  x 100 feet with Minimal Assistance using Rolling Walker.                      Time Tracking:     PT Received On: 22  PT Start Time: 1425     PT Stop Time: 1440  PT Total Time (min): 15 min     Billable Minutes: Therapeutic Activity 15    Treatment Type: Treatment  PT/PTA: PT           2022

## 2022-08-06 NOTE — PLAN OF CARE
Problem: Physical Therapy  Goal: Physical Therapy Goal  Description: Goals to be met by: 22     Patient will increase functional independence with mobility by performin. Supine to sit with MInimal Assistance  2. Sit to stand transfer with Minimal Assistance  3. Bed to chair transfer with Minimal Assistance using Rolling Walker  4. Gait  x 100 feet with Minimal Assistance using Rolling Walker.     Outcome: Ongoing, Progressing   Patient seen for therapeutic activity to increase functional mobility.

## 2022-08-06 NOTE — CARE UPDATE
Continue bipap as needed , tx q8   08/06/22 0703   Patient Assessment/Suction   Level of Consciousness (AVPU) alert   Respiratory Effort Normal;Unlabored   Expansion/Accessory Muscles/Retractions no use of accessory muscles;expansion symmetric   All Lung Fields Breath Sounds coarse;diminished   SUNITA Breath Sounds clear   LLL Breath Sounds diminished   RUL Breath Sounds coarse   RML Breath Sounds diminished   RLL Breath Sounds diminished   Rhythm/Pattern, Respiratory unlabored;depth regular   Cough Frequency infrequent   Cough Type fair;nonproductive   Skin Integrity   $ Wound Care Tech Time 15 min   Area Observed Bridge of nose   Skin Appearance without discoloration   Barrier used? Gel Cushion   PRE-TX-O2   O2 Device (Oxygen Therapy) nasal cannula   $ Is the patient on Low Flow Oxygen? Yes   Flow (L/min) 4   SpO2 95 %   Pulse Oximetry Type Continuous   $ Pulse Oximetry - Multiple Charge Pulse Oximetry - Multiple   Pulse 75   Resp (!) 21   Aerosol Therapy   $ Aerosol Therapy Charges Aerosol Treatment   Daily Review of Necessity (SVN) completed   Respiratory Treatment Status (SVN) given   Treatment Route (SVN) mask;oxygen   Patient Position (SVN) HOB elevated   Post Treatment Assessment (SVN) increased aeration   Signs of Intolerance (SVN) none   Breath Sounds Post-Respiratory Treatment   Throughout All Fields Post-Treatment All Fields   Throughout All Fields Post-Treatment aeration increased   Post-treatment Heart Rate (beats/min) 77   Post-treatment Resp Rate (breaths/min) 20   Preset CPAP/BiPAP Settings   Mode Of Delivery BiPAP S/T   $ CPAP/BiPAP Daily Charge BiPAP/CPAP Daily   $ Is patient using? Yes  (patient wear whole night)

## 2022-08-06 NOTE — PROGRESS NOTES
"[facility]  Adult Nutrition   Progress Note (Follow-Up)    SUMMARY     Recommendations  1.) Suggest NGT/keotube placement to begin TF d/t failed swallow evaluation: Vital AF 1.2 @ 20mL/hr AAT to 40mL/hr to provide 1152 kcals, 72gm protein and 779mL of free water.    Suggest FWF 30mL q4h. TF to meet 100% EEN and EPN.   2.) RD to monitor.    Goals: 1. Patient to receive/consume >/= estimated energy/protein needs.   2. Pt labs trend to target range.  Nutrition Goal Status: new  Communication of RD Recs: other (comment) (discussed with SLP.)    Dietitian Rounds Brief  Received call from Shyla VALDERRAMA as pt failed swallow study this morning. Extubated 8/5 with NGT removed. Will place recommendations for TF/TPN until RD able to assess on 8/8.    Diet order:   Current Diet Order: NPO        Evaluation of Received Nutrient/Fluid Intake  Other Calories (kcal): 0  Fluid Required: not meeting needs     % Intake of Estimated Energy Needs: 0%  % Meal Intake: NPO      Intake/Output Summary (Last 24 hours) at 8/6/2022 1223  Last data filed at 8/6/2022 0600  Gross per 24 hour   Intake 94.59 ml   Output 850 ml   Net -755.41 ml        Anthropometrics  Temp: 98.8 °F (37.1 °C)  Height: 5' 4" (162.6 cm)  Height (inches): 64 in  Weight Method: Bed Scale  Weight: 56.8 kg (125 lb 3.5 oz)  Weight (lb): 125.22 lb  Ideal Body Weight (IBW), Female: 120 lb  % Ideal Body Weight, Female (lb): 83.42 %  BMI (Calculated): 21.5       Estimated/Assessed Needs  Weight Used For Calorie Calculations: 45.4 kg (100 lb 1.4 oz)  Energy Calorie Requirements (kcal): 4303-9501 kcal/day (25-30 kcal/kg)     Protein Requirements: 45-72 gm/day (1.2-1.6 gm/kg)  Weight Used For Protein Calculations: 45.4 kg (100 lb 1.4 oz)     Estimated Fluid Requirement Method: RDA Method  RDA Method (mL): 1135       Reason for Assessment  Reason For Assessment: other (see comments) (SLP phone call for NPO status)  Diagnosis: pulmonary disease (Acute respiratory failure " with hypercapnia)  Relevant Medical History: CVA, essential hypertension, GERD, COPD and hyperlipidemia, severe tremors  Interdisciplinary Rounds: attended    Nutrition/Diet History  Spiritual, Cultural Beliefs, [employer]ian Practices, Values that Affect Care: no  Food Allergies: NKFA  Factors Affecting Nutritional Intake: NPO, inability to feed self, on mechanical ventilation    Nutrition Risk Screen  Nutrition Risk Screen: no indicators present    Weight History:  Wt Readings from Last 5 Encounters:   08/06/22 56.8 kg (125 lb 3.5 oz)   05/27/22 52.6 kg (115 lb 13.6 oz)   11/26/21 60 kg (132 lb 4.4 oz)   11/23/21 63.5 kg (140 lb)   09/16/21 69.9 kg (154 lb)        Lab/Procedures/Meds: Pertinent Labs/Meds Reviewed    Medications:Pertinent Medications Reviewed  Scheduled Meds:   albuterol-ipratropium  3 mL Nebulization Q8H    chlorhexidine  15 mL Mouth/Throat BID    dorzolamide  1 drop Both Eyes TID    enoxaparin  30 mg Subcutaneous Daily    famotidine  20 mg Oral Daily    latanoprost  1 drop Both Eyes QHS    levothyroxine  112 mcg Per OG tube Before breakfast    mupirocin   Nasal BID    potassium chloride  10 mEq Oral Once     Continuous Infusions:  PRN Meds:.acetaminophen, calcium gluconate IVPB, calcium gluconate IVPB, calcium gluconate IVPB, magnesium oxide, magnesium oxide, magnesium sulfate IVPB, magnesium sulfate IVPB, magnesium sulfate IVPB, magnesium sulfate IVPB, melatonin, naloxone, ondansetron, polyethylene glycol, potassium bicarbonate, potassium bicarbonate, potassium bicarbonate, potassium chloride in water **AND** potassium chloride in water **AND** potassium chloride in water, potassium, sodium phosphates, potassium, sodium phosphates, potassium, sodium phosphates, sodium phosphate IVPB, sodium phosphate IVPB, sodium phosphate IVPB, sodium phosphate IVPB, sodium phosphate IVPB, sodium phosphate IVPB    Labs: Pertinent Labs Reviewed  Clinical Chemistry:  Recent Labs   Lab 08/02/22  1305  08/03/22  0400 08/04/22  0443 08/05/22  0432 08/06/22  0440      < > 143 141 142   K 3.4*   < > 3.5 4.4 4.3   CL 95   < > 106 106 104   CO2 36*   < > 31* 32* 32*   *   < > 165* 111* 70   BUN 24*   < > 25* 20 15   CREATININE 1.0   < > 1.1 0.8 0.7   CALCIUM 9.2   < > 8.2* 8.1* 8.5*   PROT 7.3  --   --   --   --    ALBUMIN 3.5  --   --   --   --    BILITOT 1.8*  --   --   --   --    ALKPHOS 48*  --   --   --   --    AST 34  --   --   --   --    ALT 17  --   --   --   --    ANIONGAP 8   < > 6* 3* 6*   MG 1.9   < > 2.3 1.8  --    PHOS 4.2   < > 4.7* 3.3 3.1   LIPASE 31  --   --   --   --     < > = values in this interval not displayed.     CBC:   Recent Labs   Lab 08/06/22  0440   WBC 3.78*   RBC 3.72*   HGB 10.3*   HCT 34.2*      MCV 92   MCH 27.7   MCHC 30.1*     Cardiac Profile:  Recent Labs   Lab 08/02/22  1305 08/03/22  0400   BNP 1,258*  --    CPK 59  --    TROPONINI 0.031 0.034     Thyroid & Parathyroid:  Recent Labs   Lab 08/02/22  1305   TSH 3.040       Monitor and Evaluation  Food and Nutrient Intake: enteral nutrition intake  Food and Nutrient Adminstration: enteral and parenteral nutrition administration  Knowledge/Beliefs/Attitudes: food and nutrition knowledge/skill  Physical Activity and Function: nutrition-related ADLs and IADLs  Anthropometric Measurements: weight, weight change, body mass index  Biochemical Data, Medical Tests and Procedures: electrolyte and renal panel, lipid profile, gastrointestinal profile, glucose/endocrine profile, inflammatory profile  Nutrition-Focused Physical Findings: overall appearance     Nutrition Risk  Level of Risk/Frequency of Follow-up: high     Nutrition Follow-Up  RD Follow-up?: Yes      Leatha Jaeger, MAXIMINO 08/06/2022 12:23 PM

## 2022-08-06 NOTE — PLAN OF CARE
Problem: Oral Intake Inadequate (Acute Kidney Injury/Impairment)  Goal: Optimal Nutrition Intake  Outcome: Ongoing, Not Progressing  Intervention: Promote and Optimize Nutrition  Flowsheets (Taken 8/6/2022 1224)  Oral Nutrition Promotion: other (see comments)     Problem: Malnutrition  Goal: Improved Nutritional Intake  Outcome: Ongoing, Progressing  Intervention: Promote and Optimize Nutrition Support  Flowsheets (Taken 8/6/2022 1224)  Nutrition Support Management:   tube feeding held   other (see comments)   weight trending reviewed  Intervention: Promote and Optimize Oral Intake  Flowsheets (Taken 8/6/2022 1224)  Oral Nutrition Promotion: other (see comments)

## 2022-08-06 NOTE — PROGRESS NOTES
[facility] Medicine  Progress Note    Patient name: Michelle Campo  MRN: [id number]  Admit Date: 8/2/2022   LOS: 4 days     SUBJECTIVE:     Principal problem: Acute respiratory failure with hypercapnia    Interval History:  Patient was seen and examined bedside, she was extubated yesterday p.m., appears very frail and deconditioned, does not like to talk, does not like to eat.  No acute overnight events reported.  Is off Levophed.      Hospital course:  87-year-old female with history of CVA, GERD, COPD and hyperlipidemia brought by EMS secondary generalized weakness of 2 weeks and altered mental status.  Intubated upon arrival to the ED for airway protection.  Found to be in acute hypercapnic respiratory failure.  Pulmonology following.  Hospital stay notable for hypovolemic shock requiring norepinephrine.    Scheduled Meds:   albuterol-ipratropium  3 mL Nebulization Q8H    chlorhexidine  15 mL Mouth/Throat BID    dorzolamide  1 drop Both Eyes TID    enoxaparin  30 mg Subcutaneous Daily    famotidine  20 mg Oral Daily    latanoprost  1 drop Both Eyes QHS    levothyroxine  112 mcg Per OG tube Before breakfast    mupirocin   Nasal BID    potassium chloride  10 mEq Oral Once     Continuous Infusions:    PRN Meds:acetaminophen, calcium gluconate IVPB, calcium gluconate IVPB, calcium gluconate IVPB, magnesium oxide, magnesium oxide, magnesium sulfate IVPB, magnesium sulfate IVPB, magnesium sulfate IVPB, magnesium sulfate IVPB, melatonin, naloxone, ondansetron, polyethylene glycol, potassium bicarbonate, potassium bicarbonate, potassium bicarbonate, potassium chloride in water **AND** potassium chloride in water **AND** potassium chloride in water, potassium, sodium phosphates, potassium, sodium phosphates, potassium, sodium phosphates, sodium phosphate IVPB, sodium phosphate IVPB, sodium phosphate IVPB, sodium phosphate IVPB, sodium phosphate IVPB, sodium phosphate IVPB    Review of  patient's allergies indicates:   Allergen Reactions    Pcn [penicillins] Itching     Tolerated CTX 9/15       Review of Systems:  Unable to obtain - intubated     OBJECTIVE:     Vital Signs (Most Recent)  Temp: 98.8 °F (37.1 °C) (08/06/22 1101)  Pulse: 80 (08/06/22 1101)  Resp: (!) 25 (08/06/22 1101)  BP: 122/60 (08/06/22 1101)  SpO2: 95 % (08/06/22 1101)    Vital Signs Range (Last 24H):  Temp:  [97.2 °F (36.2 °C)-99.6 °F (37.6 °C)]   Pulse:  [70-98]   Resp:  [16-28]   BP: ()/(55-90)   SpO2:  [54 %-100 %]     I & O (Last 24H):    Intake/Output Summary (Last 24 hours) at 8/6/2022 1224  Last data filed at 8/6/2022 0600  Gross per 24 hour   Intake 94.59 ml   Output 850 ml   Net -755.41 ml       Physical Exam:  General: Patient resting comfortably in no acute distress.  Appears frail  Eyes: No conjunctival injection. No scleral icterus.  ENT: No discharge from ears. No nasal discharge.    CVS: RRR. No LE edema BL  Lungs:  On supplemental oxygen, bilateral crackles noted  Abdomen:  Soft, nontender and nondistended.  No organomegaly  Neuro:  Alert, moving all extremities  Skin:  No rash or erythema noted  MSK:  Severe muscle wasting noted  :  Locke catheter with clear yellow urine    Laboratory:  I have reviewed all pertinent lab results within the past 24 hours.  CBC:   Recent Labs   Lab 08/06/22  0440   WBC 3.78*   RBC 3.72*   HGB 10.3*   HCT 34.2*      MCV 92   MCH 27.7   MCHC 30.1*     CMP:   Recent Labs   Lab 08/02/22  1305 08/03/22  0400 08/06/22  0440   *   < > 70   CALCIUM 9.2   < > 8.5*   ALBUMIN 3.5  --   --    PROT 7.3  --   --       < > 142   K 3.4*   < > 4.3   CO2 36*   < > 32*   CL 95   < > 104   BUN 24*   < > 15   CREATININE 1.0   < > 0.7   ALKPHOS 48*  --   --    ALT 17  --   --    AST 34  --   --    BILITOT 1.8*  --   --     < > = values in this interval not displayed.     ABGs:   Recent Labs   Lab 08/05/22  1614   PH 7.298*   PCO2 65.0*   PO2 99   HCO3 31.9*   POCSATURATED 97    BE 5       Diagnostic Results:  Labs: Reviewed    ASSESSMENT/PLAN:     Active Hospital Problems    Diagnosis  POA    *Acute respiratory failure with hypercapnia [J96.02]  Yes    Hypovolemic shock [R57.1]  Yes    Hypertension [I10]  Yes    Hyperlipidemia [E78.5]  Yes    Hypothyroidism [E03.9]  Yes    COPD (chronic obstructive pulmonary disease) [J44.9]  Yes      Resolved Hospital Problems   No resolved problems to display.       Plan:  Acute hypercapnic respiratory failure complicated by metabolic encephalopathy   Mechanical ventilation initiated for airway protection, extubated on 08/05  Sx likely due to worsening COPD. Not on any medications which can cause hypoventilation   Appreciate pulmonology input  Monitor off antibiotics  ABGs p.r.n.  Pulmonology consultation   Monitor electrolytes and replete per protocol  2D echo findings noted including severe pulmonary hypertension, mitral stenosis      Shock is likely hypovolemic:  Resolved  Off Levophed, stop IV fluids, encourage p.o. hydration    Failure to thrive  Encourage p.o. nutrition  Monitor electrolytes and replete per protocol  PT, OT, out of bed to chair  Incentive spirometer     Chronic medical conditions:  Hypothyroidism: Continue levothyroxine   Hyperlipidemia: Continue home statin   Glaucoma: Continue latanoprost, dorzolamide and brimonidine -timolol    Updated multiple family members at bedside.     VTE Risk Mitigation (From admission, onward)         Ordered     enoxaparin injection 30 mg  Daily         08/02/22 1705     IP VTE HIGH RISK PATIENT  Once         08/02/22 1705     Place sequential compression device  Until discontinued         08/02/22 1705                    Department Hospital Medicine  [facility]  Les Schrader MD  Date of service: 08/06/2022

## 2022-08-06 NOTE — PLAN OF CARE
Problem: SLP  Goal: SLP Goal  Description:     1. Pt will participate in ongoing swallow assessment.  Outcome: Ongoing, Progressing

## 2022-08-07 PROBLEM — R57.1 HYPOVOLEMIC SHOCK: Status: RESOLVED | Noted: 2022-08-03 | Resolved: 2022-08-07

## 2022-08-07 PROBLEM — I05.0 MITRAL STENOSIS: Status: ACTIVE | Noted: 2022-08-07

## 2022-08-07 PROBLEM — R62.7 FAILURE TO THRIVE IN ADULT: Status: ACTIVE | Noted: 2022-08-07

## 2022-08-07 PROBLEM — I35.0 AORTIC STENOSIS: Status: ACTIVE | Noted: 2022-08-07

## 2022-08-07 PROBLEM — I27.20 PULMONARY HYPERTENSION: Status: ACTIVE | Noted: 2022-08-07

## 2022-08-07 LAB
ALBUMIN SERPL BCP-MCNC: 2.4 G/DL (ref 3.5–5.2)
ALLENS TEST: ABNORMAL
ALP SERPL-CCNC: 34 U/L (ref 55–135)
ALT SERPL W/O P-5'-P-CCNC: 10 U/L (ref 10–44)
ANION GAP SERPL CALC-SCNC: 6 MMOL/L (ref 8–16)
AST SERPL-CCNC: 13 U/L (ref 10–40)
BACTERIA BLD CULT: NORMAL
BACTERIA BLD CULT: NORMAL
BILIRUB SERPL-MCNC: 1.2 MG/DL (ref 0.1–1)
BUN SERPL-MCNC: 16 MG/DL (ref 8–23)
CALCIUM SERPL-MCNC: 8.1 MG/DL (ref 8.7–10.5)
CHLORIDE SERPL-SCNC: 101 MMOL/L (ref 95–110)
CO2 SERPL-SCNC: 32 MMOL/L (ref 23–29)
CREAT SERPL-MCNC: 0.7 MG/DL (ref 0.5–1.4)
DELSYS: ABNORMAL
EP: 6
ERYTHROCYTE [SEDIMENTATION RATE] IN BLOOD BY WESTERGREN METHOD: 20 MM/H
EST. GFR  (NO RACE VARIABLE): >60 ML/MIN/1.73 M^2
FIO2: 32
GLUCOSE SERPL-MCNC: 61 MG/DL (ref 70–110)
GLUCOSE SERPL-MCNC: 62 MG/DL (ref 70–110)
GLUCOSE SERPL-MCNC: 89 MG/DL (ref 70–110)
HCO3 UR-SCNC: 32.1 MMOL/L (ref 24–28)
HCT VFR BLD CALC: 32 %PCV (ref 36–54)
IP: 12
MAGNESIUM SERPL-MCNC: 1.5 MG/DL (ref 1.6–2.6)
MODE: ABNORMAL
PCO2 BLDA: 58.2 MMHG (ref 35–45)
PH SMN: 7.35 [PH] (ref 7.35–7.45)
PHOSPHATE SERPL-MCNC: 2.6 MG/DL (ref 2.7–4.5)
PO2 BLDA: 65 MMHG (ref 80–100)
POC BE: 6 MMOL/L
POC IONIZED CALCIUM: 1.29 MMOL/L (ref 1.06–1.42)
POC SATURATED O2: 91 % (ref 95–100)
POC TCO2: 34 MMOL/L (ref 23–27)
POTASSIUM BLD-SCNC: 4.3 MMOL/L (ref 3.5–5.1)
POTASSIUM SERPL-SCNC: 4.6 MMOL/L (ref 3.5–5.1)
PROT SERPL-MCNC: 5.5 G/DL (ref 6–8.4)
SAMPLE: ABNORMAL
SITE: ABNORMAL
SODIUM BLD-SCNC: 141 MMOL/L (ref 136–145)
SODIUM SERPL-SCNC: 139 MMOL/L (ref 136–145)

## 2022-08-07 PROCEDURE — 83735 ASSAY OF MAGNESIUM: CPT | Performed by: HOSPITALIST

## 2022-08-07 PROCEDURE — 99900031 HC PATIENT EDUCATION (STAT)

## 2022-08-07 PROCEDURE — 99233 PR SUBSEQUENT HOSPITAL CARE,LEVL III: ICD-10-PCS | Mod: ,,, | Performed by: INTERNAL MEDICINE

## 2022-08-07 PROCEDURE — 25000003 PHARM REV CODE 250: Performed by: INTERNAL MEDICINE

## 2022-08-07 PROCEDURE — 84100 ASSAY OF PHOSPHORUS: CPT | Performed by: INTERNAL MEDICINE

## 2022-08-07 PROCEDURE — 94799 UNLISTED PULMONARY SVC/PX: CPT

## 2022-08-07 PROCEDURE — 25000242 PHARM REV CODE 250 ALT 637 W/ HCPCS: Performed by: HOSPITALIST

## 2022-08-07 PROCEDURE — 36600 WITHDRAWAL OF ARTERIAL BLOOD: CPT

## 2022-08-07 PROCEDURE — 63600175 PHARM REV CODE 636 W HCPCS: Performed by: INTERNAL MEDICINE

## 2022-08-07 PROCEDURE — 99233 SBSQ HOSP IP/OBS HIGH 50: CPT | Mod: ,,, | Performed by: INTERNAL MEDICINE

## 2022-08-07 PROCEDURE — 25000242 PHARM REV CODE 250 ALT 637 W/ HCPCS: Performed by: INTERNAL MEDICINE

## 2022-08-07 PROCEDURE — 94761 N-INVAS EAR/PLS OXIMETRY MLT: CPT

## 2022-08-07 PROCEDURE — 21400001 HC TELEMETRY ROOM

## 2022-08-07 PROCEDURE — 84132 ASSAY OF SERUM POTASSIUM: CPT

## 2022-08-07 PROCEDURE — 80053 COMPREHEN METABOLIC PANEL: CPT | Performed by: HOSPITALIST

## 2022-08-07 PROCEDURE — 82803 BLOOD GASES ANY COMBINATION: CPT

## 2022-08-07 PROCEDURE — 84295 ASSAY OF SERUM SODIUM: CPT

## 2022-08-07 PROCEDURE — 25000003 PHARM REV CODE 250: Performed by: HOSPITALIST

## 2022-08-07 PROCEDURE — 94640 AIRWAY INHALATION TREATMENT: CPT

## 2022-08-07 PROCEDURE — 82330 ASSAY OF CALCIUM: CPT

## 2022-08-07 PROCEDURE — 27000221 HC OXYGEN, UP TO 24 HOURS

## 2022-08-07 PROCEDURE — 85014 HEMATOCRIT: CPT

## 2022-08-07 PROCEDURE — 25000003 PHARM REV CODE 250

## 2022-08-07 PROCEDURE — 94660 CPAP INITIATION&MGMT: CPT

## 2022-08-07 PROCEDURE — 99900035 HC TECH TIME PER 15 MIN (STAT)

## 2022-08-07 RX ORDER — FAMOTIDINE 20 MG/1
20 TABLET, FILM COATED ORAL 2 TIMES DAILY
Status: DISCONTINUED | OUTPATIENT
Start: 2022-08-07 | End: 2022-08-09 | Stop reason: HOSPADM

## 2022-08-07 RX ORDER — LANOLIN ALCOHOL/MO/W.PET/CERES
800 CREAM (GRAM) TOPICAL 2 TIMES DAILY
Status: DISCONTINUED | OUTPATIENT
Start: 2022-08-07 | End: 2022-08-09 | Stop reason: HOSPADM

## 2022-08-07 RX ORDER — IPRATROPIUM BROMIDE AND ALBUTEROL SULFATE 2.5; .5 MG/3ML; MG/3ML
3 SOLUTION RESPIRATORY (INHALATION) EVERY 6 HOURS
Status: DISCONTINUED | OUTPATIENT
Start: 2022-08-07 | End: 2022-08-09 | Stop reason: HOSPADM

## 2022-08-07 RX ADMIN — DEXTROSE MONOHYDRATE 12.5 G: 25 INJECTION, SOLUTION INTRAVENOUS at 06:08

## 2022-08-07 RX ADMIN — IPRATROPIUM BROMIDE AND ALBUTEROL SULFATE 3 ML: .5; 3 SOLUTION RESPIRATORY (INHALATION) at 07:08

## 2022-08-07 RX ADMIN — Medication 800 MG: at 11:08

## 2022-08-07 RX ADMIN — POLYETHYLENE GLYCOL 3350 17 G: 17 POWDER, FOR SOLUTION ORAL at 08:08

## 2022-08-07 RX ADMIN — CHLORHEXIDINE GLUCONATE 15 ML: 1.2 RINSE ORAL at 08:08

## 2022-08-07 RX ADMIN — DORZOLAMIDE HYDROCHLORIDE 1 DROP: 20 SOLUTION/ DROPS OPHTHALMIC at 04:08

## 2022-08-07 RX ADMIN — FAMOTIDINE 20 MG: 20 TABLET ORAL at 08:08

## 2022-08-07 RX ADMIN — MAGNESIUM SULFATE HEPTAHYDRATE 2 G: 40 INJECTION, SOLUTION INTRAVENOUS at 05:08

## 2022-08-07 RX ADMIN — DORZOLAMIDE HYDROCHLORIDE 1 DROP: 20 SOLUTION/ DROPS OPHTHALMIC at 08:08

## 2022-08-07 RX ADMIN — Medication 800 MG: at 08:08

## 2022-08-07 RX ADMIN — ENOXAPARIN SODIUM 30 MG: 30 INJECTION SUBCUTANEOUS at 04:08

## 2022-08-07 RX ADMIN — IPRATROPIUM BROMIDE AND ALBUTEROL SULFATE 3 ML: .5; 3 SOLUTION RESPIRATORY (INHALATION) at 01:08

## 2022-08-07 RX ADMIN — LEVOTHYROXINE SODIUM 112 MCG: 0.11 TABLET ORAL at 05:08

## 2022-08-07 RX ADMIN — LATANOPROST 1 DROP: 50 SOLUTION OPHTHALMIC at 08:08

## 2022-08-07 RX ADMIN — MUPIROCIN 1 G: 20 OINTMENT TOPICAL at 08:08

## 2022-08-07 RX ADMIN — IPRATROPIUM BROMIDE AND ALBUTEROL SULFATE 3 ML: .5; 3 SOLUTION RESPIRATORY (INHALATION) at 06:08

## 2022-08-07 RX ADMIN — SODIUM PHOSPHATE, MONOBASIC, MONOHYDRATE 15 MMOL: 276; 142 INJECTION, SOLUTION INTRAVENOUS at 06:08

## 2022-08-07 NOTE — PLAN OF CARE
08/1935   Patient Assessment/Suction   Level of Consciousness (AVPU) alert   Respiratory Effort Unlabored   Expansion/Accessory Muscles/Retractions expansion symmetric   All Lung Fields Breath Sounds coarse   Rhythm/Pattern, Respiratory assisted mechanically   Cough Frequency infrequent   Cough Type good;nonproductive   PRE-TX-O2   O2 Device (Oxygen Therapy) BiPAP   Oxygen Concentration (%) 32   SpO2 (!) 94 %   Pulse Oximetry Type Continuous   $ Pulse Oximetry - Multiple Charge Pulse Oximetry - Multiple   Pulse 74   Resp 17   Ready to Wean/Extubation Screen   FIO2<=50 (chart decimal) 0.32   Preset CPAP/BiPAP Settings   Mode Of Delivery BiPAP S/T   $ Is patient using? Yes   Size of Mask Small   Sized Appropriately? Yes   Equipment Type V60   Airway Device Type small full face mask   CPAP (cm H2O) 12   EPAP (cm H2O) 6   Set Rate (Breaths/Min) 20   ITime (sec) 1   Rise Time (sec) 3   Patient CPAP/BiPAP Settings   RR Total (Breaths/Min) 22   Tidal Volume (mL) 255   VE Minute Ventilation (L/min) 5.3 L/min   Peak Inspiratory Pressure (cm H2O) 12   TiTOT (%) 31   Total Leak (L/Min) 12   Patient Trigger - ST Mode Only (%) 23   CPAP/BiPAP Alarms   High Pressure (cm H2O) 40   Low Pressure (cm H2O) 10   High RR (breaths/min) 40   Apnea (Sec) 20   Education   $ Education Bronchodilator;15 min

## 2022-08-07 NOTE — CARE UPDATE
Bipap remains on stand by . Red patient safety alarm also plugged in [employer] from bipap   08/07/22 1313   Patient Assessment/Suction   Level of Consciousness (AVPU) alert   Respiratory Effort Normal;Unlabored   Expansion/Accessory Muscles/Retractions no use of accessory muscles   All Lung Fields Breath Sounds coarse;diminished   SUNITA Breath Sounds diminished   LLL Breath Sounds diminished   RUL Breath Sounds coarse   RML Breath Sounds coarse   RLL Breath Sounds diminished   Rhythm/Pattern, Respiratory unlabored;depth regular   Cough Frequency infrequent   Cough Type fair;nonproductive   PRE-TX-O2   O2 Device (Oxygen Therapy) nasal cannula   $ Is the patient on Low Flow Oxygen? Yes   Flow (L/min) 4   SpO2 97 %   Pulse Oximetry Type Continuous   $ Pulse Oximetry - Multiple Charge Pulse Oximetry - Multiple   Pulse 76   Resp (!) 25   Aerosol Therapy   $ Aerosol Therapy Charges Aerosol Treatment   Daily Review of Necessity (SVN) completed   Respiratory Treatment Status (SVN) given   Treatment Route (SVN) mask;oxygen   Patient Position (SVN) HOB elevated   Post Treatment Assessment (SVN) breath sounds unchanged   Signs of Intolerance (SVN) none   Breath Sounds Post-Respiratory Treatment   Throughout All Fields Post-Treatment All Fields   Throughout All Fields Post-Treatment no change   Post-treatment Heart Rate (beats/min) 80   Post-treatment Resp Rate (breaths/min) 20

## 2022-08-07 NOTE — NURSING TRANSFER
Nursing Transfer Note      8/7/2022     Reason patient is being transferred: md order    Transfer To: [id number]    Transfer via bed    Transfer with  to O2, cardiac monitoring    Transported by 2x RN    Medicines sent: yes    Chart send with patient: Yes    Notified: daughter at bedside      Upon arrival to floor: cardiac monitor applied, patient oriented to room, call bell in reach and bed in lowest position    All belonging with pt. NAD noted. Status unchanged. Notified RN of arrival.

## 2022-08-07 NOTE — PROGRESS NOTES
Pulmonary/Critical Care  Progress Note      PATIENT NAME: Michelle Campo  MRN: [id number]  TODAY'S DATE: 2022  7:50 AM  ADMIT DATE: 2022  AGE: 87 y.o. : 1935    CONSULT REQUESTED BY: Les Schrader MD    REASON FOR CONSULT:   Critical care management    HPI:  The patient is an 87 year old female with failure to thrive. She was intubated for hypercapneic respiratory failure.  She also was thought to have a urinary tract infection but her urinalysis does not support this.  The patient has been declining for 2 weeks prior to the family bringing her in when she could no longer be aroused.  The patient is requiring Levophed to maintain a blood pressure.    I have spoken with the patient's daughter who states that at her baseline she is not eating well well she requires someone to bathe her and feed her because she has severe tremors.  She has been staying with her other daughter in [facility] because the [facility] daughter has COVID.  The [facility] daughter told an oral in's daughter that she was not eating well and was not drinking boost and was moving less than less.     the patient is still requiring Levophed at 0.02.  She still is requiring assist-control ventilation to correct her respiratory acidosis.  She is awake and interactive but quite weak.  She is receiving enteral nutrition.  Her urine output is poor.    2022 - Was extubated yesterday and is stable but remains very weak.  She is not very talkative.    2022 - STable overnight, no new issues, remains weak and frail.  ECHO noted (AoS, MS, pulmonary hypertension), BNP better    REVIEW OF SYSTEMS  Unobtainable    No change in the patient's Past Medical History, Past Surgical History, Social History or Family History since admission.        VITAL SIGNS (MOST RECENT)  Temp: 98.2 °F (36.8 °C) (22)  Pulse: 85 (22)  Resp: (!) 23 (22)  BP: (!) 147/80 (22)  SpO2: (!) 90 % (22  0938)    INTAKE AND OUTPUT (LAST 24 HOURS):    Intake/Output Summary (Last 24 hours) at 8/7/2022 1018  Last data filed at 8/7/2022 0610  Gross per 24 hour   Intake 485 ml   Output 1015 ml   Net -530 ml       WEIGHT  Wt Readings from Last 1 Encounters:   08/07/22 57 kg (125 lb 10.6 oz)       PHYSICAL EXAM    GENERAL: Older patient in no distress,extubated  HEENT: Pupils equal and reactive. Extraocular movements intact. Nose intact.   NECK: Supple.  Right IJ triple-lumen catheter.  HEART: Regular rate and rhythm. + m or gallop auscultated.  LUNGS: Clear to auscultation and percussion. Lung excursion symmetrical. No change in fremitus. No adventitial noises. Shallow and decreased a bases  ABDOMEN: Bowel sounds present. Non-tender, no masses palpated.  : Normal anatomy.  Locke with minimal concentrated urine.  EXTREMITIES: Normal muscle tone and joint movement, no cyanosis or clubbing.   LYMPHATICS: No adenopathy palpated, there is edema to the right elbow..  SKIN: Dry, intact, no lesions.   NEURO:  Awake, cranial nerves appear intact.  The patient has a significant resting tremor to her left arm.  Motor strength is 5/5 in the upper extremities.  She is 5/5 but much weaker in her lower extremities.  She is responsive  PSYCH:  Quiet      CBC LAST (LAST 24 HOURS)  Recent Labs   Lab 08/07/22  0428   HCT 32*       CHEMISTRY LAST (LAST 24 HOURS)  Recent Labs   Lab 08/07/22  0316 08/07/22  0428     --    K 4.6  --      --    CO2 32*  --    ANIONGAP 6*  --    BUN 16  --    CREATININE 0.7  --    GLU 61*  --    CALCIUM 8.1*  --    PH  --  7.349*   MG 1.5*  --    ALBUMIN 2.4*  --    PROT 5.5*  --    ALKPHOS 34*  --    ALT 10  --    AST 13  --    BILITOT 1.2*  --        CARDIAC PROFILE (LAST 24 HOURS)  Recent Labs   Lab 08/02/22  1305 08/03/22  0400 08/06/22  0440   BNP 1,258*  --  874*   CPK 59  --   --    TROPONINI 0.031 0.034  --        LAST 7 DAYS MICROBIOLOGY   Microbiology Results (last 7 days)     Procedure  Component Value Units Date/Time    Blood culture x two cultures. Draw prior to antibiotics. [[id number]] Collected: 08/02/22 1330    Order Status: Completed Specimen: Blood from Peripheral, Forearm, Left Updated: 08/06/22 1432     Blood Culture, Routine No Growth to date      No Growth to date      No Growth to date      No Growth to date      No Growth to date    Narrative:      Aerobic and anaerobic    Blood culture x two cultures. Draw prior to antibiotics. [[id number]] Collected: 08/02/22 1305    Order Status: Completed Specimen: Blood from Peripheral, Antecubital, Left Updated: 08/06/22 1432     Blood Culture, Routine No Growth to date      No Growth to date      No Growth to date      No Growth to date      No Growth to date    Narrative:      Aerobic and anaerobic    Culture, Respiratory with Gram Stain [[id number]] Collected: 08/02/22 1420    Order Status: Completed Specimen: Respiratory from Tracheal Aspirate Updated: 08/04/22 0821     Respiratory Culture Normal respiratory tru     Gram Stain (Respiratory) <10 epithelial cells per low power field.     Gram Stain (Respiratory) Moderate WBC's     Gram Stain (Respiratory) Many Gram positive cocci     Gram Stain (Respiratory) Few Gram negative rods     Gram Stain (Respiratory) Few Gram negative coccobacilli    Gram Stain, Respiratory [[id number]]     Order Status: Canceled Specimen: Respiratory     Culture, Respiratory with Gram Stain [[id number]] Collected: 08/02/22 1420    Order Status: Canceled Specimen: Sputum, Expectorated           MOST RECENT IMAGING  X-Ray Chest AP Portable  Chest single view    CLINICAL DATA: Hypoxia    FINDINGS: Comparison August 5. The heart and mediastinum are unchanged. Endotracheal and nasogastric tubes have been removed. Right IJ central catheter remains in place.    Prominence of the central pulmonary vasculature and bilateral interstitial opacities are unchanged. Small pleural effusions are unchanged. Bilateral apical pleural  thickening is stable. There is no pneumothorax.    IMPRESSION:  1. Interval removal of endotracheal and nasogastric tubes. No other significant changes.    Electronically signed by:  Todd Padilla MD  8/6/2022 1:19 PM CDT Workstation: [id number]  Echo  · The left ventricle is normal in size with normal systolic function.  · The estimated ejection fraction is 65%.  · Grade I left ventricular diastolic dysfunction.  · Severe right ventricular enlargement with normal right ventricular   systolic function.  · Moderate left atrial enlargement.  · Severe right atrial enlargement.  · There is mild aortic valve stenosis.  · Aortic valve area is 1.71 cm2; peak velocity is m/s; mean gradient is 9   mmHg.  · There is moderate mitral stenosis. Mitral valve area by pressure   half-time is around 1.7-1.8 cm2. Mean gradient is around 5 mm Hg at heart   rate of 84 beats per minute.  · Mild to moderate tricuspid regurgitation.  · Intermediate central venous pressure (8 mmHg).  · The estimated PA systolic pressure is 76 mmHg.  · There is pulmonary hypertension.         CURRENT VISIT EKG  Results for orders placed or performed during the hospital encounter of 08/02/22   EKG 12-lead    Narrative    Test Reason : R00.1,    Vent. Rate : 048 BPM     Atrial Rate : 048 BPM     P-R Int : 140 ms          QRS Dur : 078 ms      QT Int : 588 ms       P-R-T Axes : 073 057 261 degrees     QTc Int : 525 ms    Sinus bradycardia  Possible Left atrial enlargement  T wave abnormality, consider inferior ischemia  T wave abnormality, consider anterolateral ischemia  Prolonged QT  Abnormal ECG  When compared with ECG of 02-AUG-2022 13:54,  Significant changes have occurred    Referred By: [email or web address]   SELF           Confirmed By:        ECHOCARDIOGRAM RESULTS  No results found for this or any previous visit.        VENTILATOR INFORMATION  Oxygen Concentration (%):  [32] 32       LAST ARTERIAL BLOOD GAS  ABG on pressure support of 15, peep of 5  Recent  Labs   Lab 08/07/22  0428   PH 7.349*   PO2 65*   PCO2 58.2*   HCO3 32.1*   BE 6       IMPRESSION AND PLAN  Failure to thrive, advanced age   Acute on chronic hypercapnic respiratory failure requiring mechanical ventilation  Shock, etiology unclear other than end-stage life, volume now high and off Levophed  COPD  Hypokalemia, improving  Hypomagnesemia, corrected  Hypophophotemia, corrected  Anemia  Oliguria slightly improved, patient 8 L ahead    Stable extubated but frail and could decompensate  Up to cardiac chair  Replace electrolytes  Enoxaparin for DVT prophylaxis  Pantoprazole for GI prophylaxis  ECHO noted  Tried to call daughter yesterday to discuss code status but no answer  Increase activity as able      Paul Wilson MD  [facility] Pulmonary/Critical Care  08/07/2022

## 2022-08-07 NOTE — CARE UPDATE
Continue bipap when sleeping qhs, and tx q6   08/07/22 0700   Patient Assessment/Suction   Level of Consciousness (AVPU) alert   Respiratory Effort Normal;Unlabored   Expansion/Accessory Muscles/Retractions no use of accessory muscles;expansion symmetric   All Lung Fields Breath Sounds coarse;diminished   SUNITA Breath Sounds coarse   LLL Breath Sounds diminished   RUL Breath Sounds coarse   RML Breath Sounds coarse   RLL Breath Sounds diminished   Rhythm/Pattern, Respiratory assisted mechanically   Cough Frequency infrequent   Cough Type fair;nonproductive   Skin Integrity   $ Wound Care Tech Time 15 min   Area Observed Bridge of nose   Skin Appearance without discoloration   Barrier used? Gel Cushion   PRE-TX-O2   O2 Device (Oxygen Therapy) BiPAP   $ Is the patient on Low Flow Oxygen? Yes   $ Noninvasive Daily Charge Noninvasive Daily   Oxygen Concentration (%) 32   Pulse Oximetry Type Continuous   $ Pulse Oximetry - Multiple Charge Pulse Oximetry - Multiple   Aerosol Therapy   $ Aerosol Therapy Charges Aerosol Treatment   Daily Review of Necessity (SVN) completed   Respiratory Treatment Status (SVN) given   Treatment Route (SVN) oxygen;in-line   Patient Position (SVN) HOB elevated   Post Treatment Assessment (SVN) breath sounds unchanged   Signs of Intolerance (SVN) none   Breath Sounds Post-Respiratory Treatment   Throughout All Fields Post-Treatment All Fields   Throughout All Fields Post-Treatment no change   Post-treatment Heart Rate (beats/min) 74   Post-treatment Resp Rate (breaths/min) 21   Ready to Wean/Extubation Screen   FIO2<=50 (chart decimal) 0.32   Education   $ Education Bronchodilator;15 min   Respiratory Evaluation   $ Care Plan Tech Time 15 min   $ Eval/Re-eval Charges Re-evaluation

## 2022-08-08 LAB
ALBUMIN SERPL BCP-MCNC: 2.3 G/DL (ref 3.5–5.2)
ALP SERPL-CCNC: 31 U/L (ref 55–135)
ALT SERPL W/O P-5'-P-CCNC: 10 U/L (ref 10–44)
ANION GAP SERPL CALC-SCNC: 5 MMOL/L (ref 8–16)
AST SERPL-CCNC: 11 U/L (ref 10–40)
BILIRUB SERPL-MCNC: 0.9 MG/DL (ref 0.1–1)
BUN SERPL-MCNC: 11 MG/DL (ref 8–23)
CALCIUM SERPL-MCNC: 8.4 MG/DL (ref 8.7–10.5)
CHLORIDE SERPL-SCNC: 102 MMOL/L (ref 95–110)
CO2 SERPL-SCNC: 35 MMOL/L (ref 23–29)
CREAT SERPL-MCNC: 0.5 MG/DL (ref 0.5–1.4)
EST. GFR  (NO RACE VARIABLE): >60 ML/MIN/1.73 M^2
GLUCOSE SERPL-MCNC: 77 MG/DL (ref 70–110)
MAGNESIUM SERPL-MCNC: 2 MG/DL (ref 1.6–2.6)
PHOSPHATE SERPL-MCNC: 2.8 MG/DL (ref 2.7–4.5)
POTASSIUM SERPL-SCNC: 4 MMOL/L (ref 3.5–5.1)
PROT SERPL-MCNC: 5.3 G/DL (ref 6–8.4)
SODIUM SERPL-SCNC: 142 MMOL/L (ref 136–145)

## 2022-08-08 PROCEDURE — 63600175 PHARM REV CODE 636 W HCPCS: Performed by: INTERNAL MEDICINE

## 2022-08-08 PROCEDURE — 99232 PR SUBSEQUENT HOSPITAL CARE,LEVL II: ICD-10-PCS | Mod: ,,, | Performed by: INTERNAL MEDICINE

## 2022-08-08 PROCEDURE — 99900031 HC PATIENT EDUCATION (STAT)

## 2022-08-08 PROCEDURE — 94761 N-INVAS EAR/PLS OXIMETRY MLT: CPT

## 2022-08-08 PROCEDURE — 83735 ASSAY OF MAGNESIUM: CPT | Performed by: HOSPITALIST

## 2022-08-08 PROCEDURE — 84100 ASSAY OF PHOSPHORUS: CPT | Performed by: INTERNAL MEDICINE

## 2022-08-08 PROCEDURE — 99900035 HC TECH TIME PER 15 MIN (STAT)

## 2022-08-08 PROCEDURE — 97110 THERAPEUTIC EXERCISES: CPT

## 2022-08-08 PROCEDURE — 94640 AIRWAY INHALATION TREATMENT: CPT

## 2022-08-08 PROCEDURE — 21400001 HC TELEMETRY ROOM

## 2022-08-08 PROCEDURE — 25000003 PHARM REV CODE 250: Performed by: HOSPITALIST

## 2022-08-08 PROCEDURE — 99232 SBSQ HOSP IP/OBS MODERATE 35: CPT | Mod: ,,, | Performed by: INTERNAL MEDICINE

## 2022-08-08 PROCEDURE — 27000221 HC OXYGEN, UP TO 24 HOURS

## 2022-08-08 PROCEDURE — 25000242 PHARM REV CODE 250 ALT 637 W/ HCPCS: Performed by: HOSPITALIST

## 2022-08-08 PROCEDURE — 25000003 PHARM REV CODE 250: Performed by: INTERNAL MEDICINE

## 2022-08-08 PROCEDURE — 97116 GAIT TRAINING THERAPY: CPT

## 2022-08-08 PROCEDURE — 92526 ORAL FUNCTION THERAPY: CPT

## 2022-08-08 PROCEDURE — 36415 COLL VENOUS BLD VENIPUNCTURE: CPT | Performed by: INTERNAL MEDICINE

## 2022-08-08 PROCEDURE — 80053 COMPREHEN METABOLIC PANEL: CPT | Performed by: HOSPITALIST

## 2022-08-08 RX ORDER — FLUTICASONE FUROATE AND VILANTEROL 100; 25 UG/1; UG/1
1 POWDER RESPIRATORY (INHALATION) DAILY
Status: DISCONTINUED | OUTPATIENT
Start: 2022-08-09 | End: 2022-08-09 | Stop reason: HOSPADM

## 2022-08-08 RX ADMIN — LATANOPROST 1 DROP: 50 SOLUTION OPHTHALMIC at 08:08

## 2022-08-08 RX ADMIN — DORZOLAMIDE HYDROCHLORIDE 1 DROP: 20 SOLUTION/ DROPS OPHTHALMIC at 08:08

## 2022-08-08 RX ADMIN — ENOXAPARIN SODIUM 30 MG: 30 INJECTION SUBCUTANEOUS at 06:08

## 2022-08-08 RX ADMIN — DORZOLAMIDE HYDROCHLORIDE 1 DROP: 20 SOLUTION/ DROPS OPHTHALMIC at 06:08

## 2022-08-08 RX ADMIN — IPRATROPIUM BROMIDE AND ALBUTEROL SULFATE 3 ML: .5; 3 SOLUTION RESPIRATORY (INHALATION) at 07:08

## 2022-08-08 RX ADMIN — Medication 800 MG: at 08:08

## 2022-08-08 RX ADMIN — LEVOTHYROXINE SODIUM 112 MCG: 0.11 TABLET ORAL at 06:08

## 2022-08-08 RX ADMIN — IPRATROPIUM BROMIDE AND ALBUTEROL SULFATE 3 ML: .5; 3 SOLUTION RESPIRATORY (INHALATION) at 01:08

## 2022-08-08 RX ADMIN — FAMOTIDINE 20 MG: 20 TABLET ORAL at 08:08

## 2022-08-08 RX ADMIN — IPRATROPIUM BROMIDE AND ALBUTEROL SULFATE 3 ML: .5; 3 SOLUTION RESPIRATORY (INHALATION) at 02:08

## 2022-08-08 NOTE — PT/OT/SLP PROGRESS
Physical Therapy Treatment    Patient Name:  Michelle Campo   MRN:  [id number]    Recommendations:     Discharge Recommendations:  nursing facility, skilled   Discharge Equipment Recommendations: other (see comments) (TBD)   Barriers to discharge: None    Assessment:     Michelle Campo is a 87 y.o. female admitted with a medical diagnosis of Acute respiratory failure with hypercapnia.  She presents with the following impairments/functional limitations:  weakness, impaired endurance, impaired functional mobility, gait instability, impaired balance, decreased safety awareness, impaired cardiopulmonary response to activity Pt found sitting up in chair and just finished eating lunch with family at [facility]. She is agreeable to working with PT. Pt A & O x  3 and has the following co-morbidities: CVA, GERD, COPD, HLD.  Pt tolerated session fair and required Cornelia for safe mobilization during session today. Pt would benefit from acute PT during hospitalization to increase strength, endurance and safety with mobility and would benefit from gait, therex, and there act prior to discharge home.  .    Rehab Prognosis: Fair; patient would benefit from acute skilled PT services to address these deficits and reach maximum level of function.    Recent Surgery: * No surgery found *      Plan:     During this hospitalization, patient to be seen 6 x/week to address the identified rehab impairments via therapeutic activities, therapeutic exercises, gait training and progress toward the following goals:    · Plan of Care Expires:       Subjective     Chief Complaint: weak  Patient/Family Comments/goals: none  Pain/Comfort:  · Pain Rating 1: 0/10      Objective:     Communicated with RN prior to session.  Patient found up in chair with oxygen, pulse ox (continuous), peripheral IV, telemetry, ozuna catheter upon PT entry to room.     General Precautions: Standard, fall, aspiration   Orthopedic Precautions:N/A   Braces:    Respiratory Status:  Nasal cannula, flow 3 L/min     Functional Mobility:  · Transfers:     · Sit to Stand:  moderate assistance and of 2 persons with rolling walker  · Chair to mat: contact guard assistance with  rolling walker  using  Step Transfer  · Gait: x35' with RW and CGA      AM-PAC 6 CLICK MOBILITY          Therapeutic Activities and Exercises:   Pt was educated on the following: call light use, importance of OOB activity and functional mobility to negate the negative effects of prolonged bed rest during this hospitalization, safe transfers/ambulation and discharge planning recommendations/options.     Patient left up in chair with all lines intact, call button in reach, chair alarm on, RN notified and family  present..    GOALS:   Multidisciplinary Problems     Physical Therapy Goals        Problem: Physical Therapy    Goal Priority Disciplines Outcome Goal Variances Interventions   Physical Therapy Goal     PT, PT/OT Ongoing, Progressing     Description: Goals to be met by: 22     Patient will increase functional independence with mobility by performin. Supine to sit with MInimal Assistance  2. Sit to stand transfer with Minimal Assistance  3. Bed to chair transfer with Minimal Assistance using Rolling Walker  4. Gait  x 100 feet with Minimal Assistance using Rolling Walker.                      Time Tracking:     PT Received On: 22  PT Start Time: 1133     PT Stop Time: 1150  PT Total Time (min): 17 min     Billable Minutes: Therapeutic Activity 17    Treatment Type: Treatment  PT/PTA: PT           2022

## 2022-08-08 NOTE — PLAN OF CARE
Problem: Infection  Goal: Absence of Infection Signs and Symptoms  Outcome: Ongoing, Progressing     Problem: Adult Inpatient Plan of Care  Goal: Plan of Care Review  Outcome: Ongoing, Progressing  Flowsheets (Taken 8/8/2022 1823)  Plan of Care Reviewed With:   patient   family  Goal: Optimal Comfort and Wellbeing  Outcome: Ongoing, Progressing  Intervention: Provide Person-Centered Care  Flowsheets (Taken 8/8/2022 1823)  Trust Relationship/Rapport:   care explained   thoughts/feelings acknowledged   questions answered   questions encouraged   choices provided     Problem: Fall Injury Risk  Goal: Absence of Fall and Fall-Related Injury  Outcome: Ongoing, Progressing

## 2022-08-08 NOTE — CARE UPDATE
08/07/22 1946   Patient Assessment/Suction   Level of Consciousness (AVPU) alert   Respiratory Effort Normal   Expansion/Accessory Muscles/Retractions no use of accessory muscles   All Lung Fields Breath Sounds diminished   Rhythm/Pattern, Respiratory unlabored   Cough Frequency no cough   PRE-TX-O2   O2 Device (Oxygen Therapy) nasal cannula   $ Is the patient on Low Flow Oxygen? Yes   Flow (L/min) 4   SpO2 (!) 93 %   Pulse Oximetry Type Intermittent   $ Pulse Oximetry - Multiple Charge Pulse Oximetry - Multiple   Pulse 83   Resp 19   Aerosol Therapy   $ Aerosol Therapy Charges Aerosol Treatment   Daily Review of Necessity (SVN) completed   Respiratory Treatment Status (SVN) given   Treatment Route (SVN) mask;oxygen   Patient Position (SVN) semi-Munson's   Post Treatment Assessment (SVN) breath sounds unchanged;vital signs unchanged   Signs of Intolerance (SVN) none   Education   $ Education Bronchodilator;15 min   Respiratory Evaluation   $ Care Plan Tech Time 15 min

## 2022-08-08 NOTE — PT/OT/SLP PROGRESS
Occupational Therapy   Treatment    Name: Michelle Campo  MRN: [id number]  Admitting Diagnosis:  Acute respiratory failure with hypercapnia       Recommendations:     Discharge Recommendations: nursing facility, skilled  Discharge Equipment Recommendations:   (TBD)  Barriers to discharge:       Assessment:     Michelle Campo is a 87 y.o. female with a medical diagnosis of Acute respiratory failure with hypercapnia.  She presents agreeable to OT session. Performance deficits affecting function are weakness, gait instability, impaired functional mobility, impaired self care skills, impaired balance, decreased safety awareness.     Rehab Prognosis:  Fair; patient would benefit from acute skilled OT services to address these deficits and reach maximum level of function.       Plan:     Patient to be seen 5 x/week to address the above listed problems via self-care/home management, therapeutic activities, therapeutic exercises  · Plan of Care Expires: 09/06/22  · Plan of Care Reviewed with: patient, daughter    Subjective     Pain/Comfort:  · Pain Rating 1: 0/10  · Pain Rating Post-Intervention 1: 0/10    Objective:     Communicated with: nurse prior to session.  Patient found up in chair with oxygen, pulse ox (continuous), peripheral IV, telemetry, ozuna catheter upon OT entry to room.    General Precautions: Standard, fall   Orthopedic Precautions:N/A   Braces: N/A  Respiratory Status: Room air     Occupational Performance:     Pt completed BUE exercises while sitting up in a chair with red theraband. Pt completed exercises through all planes of movement involving BUE, 2x10.      AMPAC 6 Click ADL:      Treatment & Education:  Role of OT, safety awareness, call bell use.    Patient left up in chair with all lines intact, call button in reach and family presentEducation:      GOALS:   Multidisciplinary Problems     Occupational Therapy Goals        Problem: Occupational Therapy    Goal Priority Disciplines Outcome  Interventions   Occupational Therapy Goal     OT, PT/OT     Description: Goals to be met by: 9/6/22    Patient will increase functional independence with ADLs by performing:    UE Dressing with Supervision.  LE Dressing with Supervision.  Grooming while seated with Supervision.  Toileting from bedside commode with Minimal Assistance for hygiene and clothing management.   Toilet transfer to bedside commode with Minimal Assistance.  Upper extremity exercise program x10 reps per handout, with supervision.                     Time Tracking:     OT Date of Treatment: 08/08/22  OT Start Time: 1110  OT Stop Time: 1118  OT Total Time (min): 8 min    Billable Minutes:Therapeutic Exercise 8    OT/MAICO: OT          8/8/2022

## 2022-08-08 NOTE — PROGRESS NOTES
[facility] Medicine  Progress Note    Patient name: Michelle Campo  MRN: [id number]  Admit Date: 8/2/2022   LOS: 6 days     SUBJECTIVE:     Principal problem: Acute respiratory failure with hypercapnia    Interval History:  Patient was seen and examined bedside, she continues to improve, she is more alert and lucid today, as per nursing staff she ate very good last night and today.      Hospital course:  87-year-old female with history of CVA, GERD, COPD and hyperlipidemia brought by EMS secondary generalized weakness of 2 weeks and altered mental status.  Intubated upon arrival to the ED for airway protection.  Found to be in acute hypercapnic respiratory failure.  Pulmonology following.  Hospital stay notable for hypovolemic shock requiring norepinephrine.    Scheduled Meds:   albuterol-ipratropium  3 mL Nebulization Q6H    dorzolamide  1 drop Both Eyes TID    enoxaparin  30 mg Subcutaneous Daily    famotidine  20 mg Oral BID    latanoprost  1 drop Both Eyes QHS    levothyroxine  112 mcg Per OG tube Before breakfast    magnesium oxide  800 mg Oral BID     Continuous Infusions:    PRN Meds:acetaminophen, calcium gluconate IVPB, calcium gluconate IVPB, calcium gluconate IVPB, dextrose 50%, magnesium oxide, magnesium oxide, magnesium sulfate IVPB, magnesium sulfate IVPB, magnesium sulfate IVPB, magnesium sulfate IVPB, melatonin, naloxone, ondansetron, polyethylene glycol, potassium bicarbonate, potassium bicarbonate, potassium bicarbonate, potassium chloride in water **AND** potassium chloride in water **AND** potassium chloride in water, potassium, sodium phosphates, potassium, sodium phosphates, potassium, sodium phosphates, sodium phosphate IVPB, sodium phosphate IVPB, sodium phosphate IVPB, sodium phosphate IVPB, sodium phosphate IVPB, sodium phosphate IVPB    Review of patient's allergies indicates:   Allergen Reactions    Pcn [penicillins] Itching     Tolerated CTX 9/15       Review  of Systems:  Unable to obtain - intubated     OBJECTIVE:     Vital Signs (Most Recent)  Temp: 98.1 °F (36.7 °C) (08/08/22 1115)  Pulse: 85 (08/08/22 1115)  Resp: 16 (08/08/22 1115)  BP: (!) 142/94 (08/08/22 1115)  SpO2: 98 % (08/08/22 1115)    Vital Signs Range (Last 24H):  Temp:  [98.1 °F (36.7 °C)-98.5 °F (36.9 °C)]   Pulse:  [76-92]   Resp:  [16-26]   BP: (138-165)/()   SpO2:  [93 %-100 %]     I & O (Last 24H):    Intake/Output Summary (Last 24 hours) at 8/8/2022 1254  Last data filed at 8/8/2022 0855  Gross per 24 hour   Intake 769.9 ml   Output 750 ml   Net 19.9 ml       Physical Exam:  General: Patient resting comfortably in no acute distress.  Appears frail  Eyes: No conjunctival injection. No scleral icterus.  ENT: No discharge from ears. No nasal discharge.    CVS: RRR. No LE edema BL  Lungs:  On supplemental oxygen, bilateral crackles noted  Abdomen:  Soft, nontender and nondistended.  No organomegaly  Neuro:  Alert, moving all extremities  Skin:  No rash or erythema noted  MSK:  Severe muscle wasting noted      Laboratory:  I have reviewed all pertinent lab results within the past 24 hours.  CBC:   Recent Labs   Lab 08/06/22  0440 08/07/22 0428   WBC 3.78*  --    RBC 3.72*  --    HGB 10.3*  --    HCT 34.2* 32*     --    MCV 92  --    MCH 27.7  --    MCHC 30.1*  --      CMP:   Recent Labs   Lab 08/08/22  0522   GLU 77   CALCIUM 8.4*   ALBUMIN 2.3*   PROT 5.3*      K 4.0   CO2 35*      BUN 11   CREATININE 0.5   ALKPHOS 31*   ALT 10   AST 11   BILITOT 0.9     ABGs:   Recent Labs   Lab 08/07/22 0428   PH 7.349*   PCO2 58.2*   PO2 65*   HCO3 32.1*   POCSATURATED 91*   BE 6       Diagnostic Results:  Labs: Reviewed    ASSESSMENT/PLAN:     Active Hospital Problems    Diagnosis  POA    *Acute respiratory failure with hypercapnia [J96.02]  Yes    Failure to thrive in adult [R62.7]  Yes    Pulmonary hypertension [I27.20]  Yes    Aortic stenosis [I35.0]  Yes    Mitral stenosis [I05.0]   Yes    Hypertension [I10]  Yes    Hyperlipidemia [E78.5]  Yes    Hypothyroidism [E03.9]  Yes    COPD (chronic obstructive pulmonary disease) [J44.9]  Yes      Resolved Hospital Problems    Diagnosis Date Resolved POA    Hypovolemic shock [R57.1] 08/07/2022 Yes       Plan:  Mechanical ventilation initiated for airway protection, extubated on 08/05  Sx likely due to worsening COPD. Not on any medications which can cause hypoventilation   Appreciate pulmonology input  Monitor off antibiotics  ABGs p.r.n.  Pulmonology consultation   Monitor electrolytes and replete per protocol  2D echo findings noted including severe pulmonary hypertension, mitral stenosis  Will start p.r.n. bronchodilators, Breo  Encourage p.o. nutrition  Monitor electrolytes and replete per protocol  PT, OT, out of bed to chair  Incentive spirometer  Home O2 assessment  Likely discharge tomorrow with home health arrangement  Discussed with son regarding future goals of care.       Chronic medical conditions:  Hypothyroidism: Continue levothyroxine   Hyperlipidemia: Continue home statin   Glaucoma: Continue latanoprost, dorzolamide and brimonidine -timolol        VTE Risk Mitigation (From admission, onward)         Ordered     enoxaparin injection 30 mg  Daily         08/02/22 1705     IP VTE HIGH RISK PATIENT  Once         08/02/22 1705     Place sequential compression device  Until discontinued         08/02/22 1705                    Department Hospital Medicine  [facility]  Les Schrader MD  Date of service: 08/08/2022

## 2022-08-08 NOTE — PROGRESS NOTES
Pulmonary/Critical Care  Progress Note      PATIENT NAME: Michelle Campo  MRN: [id number]  TODAY'S DATE: 2022  7:50 AM  ADMIT DATE: 2022  AGE: 87 y.o. : 1935    CONSULT REQUESTED BY: Les Schrader MD    REASON FOR CONSULT:   Critical care management    HPI:  The patient is an 87 year old female with failure to thrive. She was intubated for hypercapneic respiratory failure.  She also was thought to have a urinary tract infection but her urinalysis does not support this.  The patient has been declining for 2 weeks prior to the family bringing her in when she could no longer be aroused.  The patient is requiring Levophed to maintain a blood pressure.    I have spoken with the patient's daughter who states that at her baseline she is not eating well well she requires someone to bathe her and feed her because she has severe tremors.  She has been staying with her other daughter in [facility] because the [facility] daughter has COVID.  The [place] daughter told an oral in's daughter that she was not eating well and was not drinking boost and was moving less than less.     the patient is still requiring Levophed at 0.02.  She still is requiring assist-control ventilation to correct her respiratory acidosis.  She is awake and interactive but quite weak.  She is receiving enteral nutrition.  Her urine output is poor.    2022 - Was extubated yesterday and is stable but remains very weak.  She is not very talkative.    2022 - STable overnight, no new issues, remains weak and frail.  ECHO noted (AoS, MS, pulmonary hypertension), BNP better    REVIEW OF SYSTEMS  Unobtainable    No change in the patient's Past Medical History, Past Surgical History, Social History or Family History since admission.        VITAL SIGNS (MOST RECENT)  Temp: 98.3 °F (36.8 °C) (22)  Pulse: 84 (22)  Resp: 18 (22)  BP: (!) 152/96 (22)  SpO2: 96 % (22)    INTAKE AND  OUTPUT (LAST 24 HOURS):    Intake/Output Summary (Last 24 hours) at 8/8/2022 1015  Last data filed at 8/8/2022 0855  Gross per 24 hour   Intake 1119.9 ml   Output 750 ml   Net 369.9 ml       WEIGHT  Wt Readings from Last 1 Encounters:   08/07/22 57 kg (125 lb 10.6 oz)       PHYSICAL EXAM    GENERAL: Older patient in no distress,extubated  HEENT: Pupils equal and reactive. Extraocular movements intact. Nose intact.   NECK: Supple.  Right IJ triple-lumen catheter.  HEART: Regular rate and rhythm. + m or gallop auscultated.  LUNGS: Clear to auscultation and percussion. Lung excursion symmetrical. No change in fremitus. No adventitial noises. Shallow and decreased a bases  ABDOMEN: Bowel sounds present. Non-tender, no masses palpated.  : Normal anatomy.  Locke with minimal concentrated urine.  EXTREMITIES: Normal muscle tone and joint movement, no cyanosis or clubbing.   LYMPHATICS: No adenopathy palpated, there is edema to the right elbow..  SKIN: Dry, intact, no lesions.   NEURO:  Awake, cranial nerves appear intact.  The patient has a significant resting tremor to her left arm.  Motor strength is 5/5 in the upper extremities.  She is 5/5 but much weaker in her lower extremities.  She is responsive  PSYCH:  Quiet      CBC LAST (LAST 24 HOURS)  No results for input(s): WBC, RBC, HGB, HCT, MCV, MCH, MCHC, RDW, PLT, MPV, GRAN, LYMPH, MONO, BASO, NRBC in the last 24 hours.    CHEMISTRY LAST (LAST 24 HOURS)  Recent Labs   Lab 08/08/22  0522      K 4.0      CO2 35*   ANIONGAP 5*   BUN 11   CREATININE 0.5   GLU 77   CALCIUM 8.4*   MG 2.0   ALBUMIN 2.3*   PROT 5.3*   ALKPHOS 31*   ALT 10   AST 11   BILITOT 0.9       CARDIAC PROFILE (LAST 24 HOURS)  Recent Labs   Lab 08/02/22  1305 08/03/22  0400 08/06/22  0440   BNP 1,258*  --  874*   CPK 59  --   --    TROPONINI 0.031 0.034  --        LAST 7 DAYS MICROBIOLOGY   Microbiology Results (last 7 days)     Procedure Component Value Units Date/Time    Blood culture x  two cultures. Draw prior to antibiotics. [[id number]] Collected: 08/02/22 1330    Order Status: Completed Specimen: Blood from Peripheral, Forearm, Left Updated: 08/07/22 1432     Blood Culture, Routine No growth after 5 days.    Narrative:      Aerobic and anaerobic    Blood culture x two cultures. Draw prior to antibiotics. [[id number]] Collected: 08/02/22 1305    Order Status: Completed Specimen: Blood from Peripheral, Antecubital, Left Updated: 08/07/22 1432     Blood Culture, Routine No growth after 5 days.    Narrative:      Aerobic and anaerobic    Culture, Respiratory with Gram Stain [[id number]] Collected: 08/02/22 1420    Order Status: Completed Specimen: Respiratory from Tracheal Aspirate Updated: 08/04/22 0821     Respiratory Culture Normal respiratory tru     Gram Stain (Respiratory) <10 epithelial cells per low power field.     Gram Stain (Respiratory) Moderate WBC's     Gram Stain (Respiratory) Many Gram positive cocci     Gram Stain (Respiratory) Few Gram negative rods     Gram Stain (Respiratory) Few Gram negative coccobacilli    Gram Stain, Respiratory [[id number]]     Order Status: Canceled Specimen: Respiratory     Culture, Respiratory with Gram Stain [[id number]] Collected: 08/02/22 1420    Order Status: Canceled Specimen: Sputum, Expectorated           MOST RECENT IMAGING  X-Ray Chest AP Portable  Chest single view    CLINICAL DATA: Hypoxia    FINDINGS: Comparison August 5. The heart and mediastinum are unchanged. Endotracheal and nasogastric tubes have been removed. Right IJ central catheter remains in place.    Prominence of the central pulmonary vasculature and bilateral interstitial opacities are unchanged. Small pleural effusions are unchanged. Bilateral apical pleural thickening is stable. There is no pneumothorax.    IMPRESSION:  1. Interval removal of endotracheal and nasogastric tubes. No other significant changes.    Electronically signed by:  Todd Padilla MD  8/6/2022 1:19 PM CDT  Workstation: [id number]  Echo  · The left ventricle is normal in size with normal systolic function.  · The estimated ejection fraction is 65%.  · Grade I left ventricular diastolic dysfunction.  · Severe right ventricular enlargement with normal right ventricular   systolic function.  · Moderate left atrial enlargement.  · Severe right atrial enlargement.  · There is mild aortic valve stenosis.  · Aortic valve area is 1.71 cm2; peak velocity is m/s; mean gradient is 9   mmHg.  · There is moderate mitral stenosis. Mitral valve area by pressure   half-time is around 1.7-1.8 cm2. Mean gradient is around 5 mm Hg at heart   rate of 84 beats per minute.  · Mild to moderate tricuspid regurgitation.  · Intermediate central venous pressure (8 mmHg).  · The estimated PA systolic pressure is 76 mmHg.  · There is pulmonary hypertension.         CURRENT VISIT EKG  Results for orders placed or performed during the hospital encounter of 08/02/22   EKG 12-lead    Narrative    Test Reason : R00.1,    Vent. Rate : 048 BPM     Atrial Rate : 048 BPM     P-R Int : 140 ms          QRS Dur : 078 ms      QT Int : 588 ms       P-R-T Axes : 073 057 261 degrees     QTc Int : 525 ms    Sinus bradycardia  Possible Left atrial enlargement  T wave abnormality, consider inferior ischemia  T wave abnormality, consider anterolateral ischemia  Prolonged QT  Abnormal ECG  When compared with ECG of 02-AUG-2022 13:54,  Significant changes have occurred    Referred By: [email or web address]   SELF           Confirmed By:        ECHOCARDIOGRAM RESULTS  No results found for this or any previous visit.        VENTILATOR INFORMATION  Oxygen Concentration (%):  [32] 32       LAST ARTERIAL BLOOD GAS  ABG on pressure support of 15, peep of 5  Recent Labs   Lab 08/07/22  0428   PH 7.349*   PO2 65*   PCO2 58.2*   HCO3 32.1*   BE 6       IMPRESSION AND PLAN  Failure to thrive, advanced age   Acute on chronic hypercapnic respiratory failure, on nasal cannula  Shock,  resolved  COPD  Anemia  Pulmonary hypertension  Valvular heart disease  Moderate hypoalbuminemia, patient remains anorectic    Stable extubated but frail and could decompensate  Up to chair  Add Ensure  Continue DuoNebs  Enoxaparin for DVT prophylaxis  Pantoprazole for GI prophylaxis  Increase activity as able  Home soon versus SNF  Code status remains inappropriate for this extremely debilitated and elderly patient

## 2022-08-08 NOTE — CARE UPDATE
08/08/22 0718   Patient Assessment/Suction   Level of Consciousness (AVPU) alert   Respiratory Effort Normal;Unlabored   Expansion/Accessory Muscles/Retractions no use of accessory muscles;no retractions;expansion symmetric   All Lung Fields Breath Sounds diminished   Rhythm/Pattern, Respiratory unlabored;pattern regular;depth regular;chest wiggle adequate;no shortness of breath reported   Cough Frequency no cough   PRE-TX-O2   O2 Device (Oxygen Therapy) nasal cannula   $ Is the patient on Low Flow Oxygen? Yes   Flow (L/min) 3   SpO2 96 %   Pulse Oximetry Type Intermittent   $ Pulse Oximetry - Multiple Charge Pulse Oximetry - Multiple   Pulse 84   Resp 18   Aerosol Therapy   Daily Review of Necessity (SVN) completed   Treatment Route (SVN) oxygen;mask   Patient Position (SVN) HOB elevated   Post Treatment Assessment (SVN) increased aeration   Signs of Intolerance (SVN) none   Breath Sounds Post-Respiratory Treatment   Throughout All Fields Post-Treatment All Fields   Throughout All Fields Post-Treatment aeration increased   Post-treatment Heart Rate (beats/min) 80   Post-treatment Resp Rate (breaths/min) 18   Preset CPAP/BiPAP Settings   Mode Of Delivery BiPAP;Standby   Equipment Type V60   Education   $ Education 15 min;Bronchodilator   Respiratory Evaluation   $ Care Plan Tech Time 15 min

## 2022-08-08 NOTE — PLAN OF CARE
Problem: SLP  Goal: SLP Goal  Description: 1. Pt will participate in ongoing swallow assessment--MET    Outcome: Ongoing, Progressing    Up in chair. Seen for ongoing swallow evaluation. REC advancing to dental soft textures (IDDSI 6) with thin liquids. Will monitor tolerance x1 day

## 2022-08-08 NOTE — PT/OT/SLP PROGRESS
"Speech Language Pathology Treatment    Patient Name:  Michelle Campo   MRN:  [id number]  Admitting Diagnosis: Acute respiratory failure with hypercapnia    Recommendations:                 General Recommendations:  Will monitor tolerance x1 day  Diet recommendations:  Soft & Bite Sized Diet - IDDSI Level 6, Liquid Diet Level: Thin   Aspiration Precautions: up in chair for meals, if able, 1 bite/sip at a time, Assistance with meals, Avoid talking while eating, Small bites/sips and Wear oxygen during intake   General Precautions: Standard, aspiration, fall  Communication strategies:  provide increased time to answer and frequent reorientation    Subjective     "Michelle Campo."  "I'm good."    Pain/Comfort:  Pain Rating 1: 0/10    Respiratory Status: Nasal cannula, flow 3 L/min    Objective:   Pt seen for ongoing swallow assessment. Awake and sitting up in chair. Appears tired, withdrawn (?) Oriented to self only. Pt underwent clinical swallow evaluation 8/9 with recommendation of NPO, however, pt advanced to mechanical soft textures (IDDSI 5)/thin liquids 8/7/22. Family reports continued poor appetite. Pt and family deny dysphagia.     Pt consumed varying amounts of thin liquids, applesauce, diced peaches with functional oral prep and no overt s/s penetration/aspiration.     Has the patient been evaluated by SLP for swallowing?      Keep patient NPO?     Current Respiratory Status:        Assessment:     Michelle Campo is a 87 y.o. female with an SLP diagnosis of Dysphagia.  She presents with aspiration risk 2° advanced age, generalized weakness and acute illness. REC advancing diet to dental soft textures (IDDSI 6) with thin liquids and above stated aspiration precautions. Will monitor tolerance x 1 day. .    Goals:   Multidisciplinary Problems     SLP Goals        Problem: SLP    Goal Priority Disciplines Outcome   SLP Goal     SLP Ongoing, Progressing   Description: 1. Pt will participate in ongoing swallow " assessment--MET                     Plan:     · Patient to be seen:  6 x/week   · Plan of Care expires:     · Plan of Care reviewed with:  patient, family   · SLP Follow-Up:  Yes       Discharge recommendations:  other (see comments) (to be determined)   Barriers to Discharge:  Level of Skilled Assistance Needed , and Safety Awareness .    Time Tracking:     SLP Treatment Date:   08/08/22  Speech Start Time:  1351  Speech Stop Time:  1401     Speech Total Time (min):  10 min    Billable Minutes: Treatment Swallowing Dysfunction 10 and Total Time 10    08/08/2022

## 2022-08-08 NOTE — NURSING
Pt's PICC line removed by pt. Site cleaned with CHG and occlusive dressing applied. No bleeding to site noted. Cathter appears intact. MD notified. Peripheral IV started. Will continue to monitor.     Unique Yee

## 2022-08-08 NOTE — PLAN OF CARE
Met with patient / family at bedside to complete patient choices form.  Referral sent via [facility] to [facility] home care as this is family's choice and patient used this home care service in the past. Call placed to [facility], spoke with Rosmery in intake, informed of referral sent.    Received phone call from Rosmery at [facility] that they have accepted the patient and if patient discharges 8/9/2022, home health start of care will be the next day 8/10/2022.       08/08/22 1615   Post-Acute Status   Post-Acute Authorization Home Health   Home Health Status Referrals Sent   Discharge Delays None known at this time   Discharge Plan   Discharge Plan A Home;Home with family;Home Health   Discharge Plan B Home;Home with family;Home Health

## 2022-08-08 NOTE — CARE UPDATE
08/08/22 1521   Home Oxygen Qualification   $ Home O2 Qualification   (patient can only walk with pt)

## 2022-08-09 VITALS
RESPIRATION RATE: 18 BRPM | TEMPERATURE: 98 F | DIASTOLIC BLOOD PRESSURE: 85 MMHG | WEIGHT: 122.56 LBS | BODY MASS INDEX: 20.92 KG/M2 | HEIGHT: 64 IN | SYSTOLIC BLOOD PRESSURE: 136 MMHG | HEART RATE: 72 BPM | OXYGEN SATURATION: 95 %

## 2022-08-09 LAB
ALBUMIN SERPL BCP-MCNC: 2.3 G/DL (ref 3.5–5.2)
ALP SERPL-CCNC: 31 U/L (ref 55–135)
ALT SERPL W/O P-5'-P-CCNC: 8 U/L (ref 10–44)
ANION GAP SERPL CALC-SCNC: 5 MMOL/L (ref 8–16)
AST SERPL-CCNC: 10 U/L (ref 10–40)
BILIRUB SERPL-MCNC: 0.6 MG/DL (ref 0.1–1)
BUN SERPL-MCNC: 11 MG/DL (ref 8–23)
CALCIUM SERPL-MCNC: 8.5 MG/DL (ref 8.7–10.5)
CHLORIDE SERPL-SCNC: 101 MMOL/L (ref 95–110)
CO2 SERPL-SCNC: 34 MMOL/L (ref 23–29)
CREAT SERPL-MCNC: 0.7 MG/DL (ref 0.5–1.4)
EST. GFR  (NO RACE VARIABLE): >60 ML/MIN/1.73 M^2
GLUCOSE SERPL-MCNC: 73 MG/DL (ref 70–110)
MAGNESIUM SERPL-MCNC: 1.9 MG/DL (ref 1.6–2.6)
PHOSPHATE SERPL-MCNC: 2.8 MG/DL (ref 2.7–4.5)
POTASSIUM SERPL-SCNC: 4.4 MMOL/L (ref 3.5–5.1)
PROT SERPL-MCNC: 5.2 G/DL (ref 6–8.4)
SODIUM SERPL-SCNC: 140 MMOL/L (ref 136–145)

## 2022-08-09 PROCEDURE — 99231 PR SUBSEQUENT HOSPITAL CARE,LEVL I: ICD-10-PCS | Mod: ,,, | Performed by: INTERNAL MEDICINE

## 2022-08-09 PROCEDURE — 83735 ASSAY OF MAGNESIUM: CPT | Performed by: HOSPITALIST

## 2022-08-09 PROCEDURE — 94660 CPAP INITIATION&MGMT: CPT

## 2022-08-09 PROCEDURE — 80053 COMPREHEN METABOLIC PANEL: CPT | Performed by: HOSPITALIST

## 2022-08-09 PROCEDURE — 25000003 PHARM REV CODE 250: Performed by: INTERNAL MEDICINE

## 2022-08-09 PROCEDURE — 94640 AIRWAY INHALATION TREATMENT: CPT

## 2022-08-09 PROCEDURE — 25000003 PHARM REV CODE 250: Performed by: HOSPITALIST

## 2022-08-09 PROCEDURE — 97116 GAIT TRAINING THERAPY: CPT

## 2022-08-09 PROCEDURE — 25000242 PHARM REV CODE 250 ALT 637 W/ HCPCS: Performed by: HOSPITALIST

## 2022-08-09 PROCEDURE — 84100 ASSAY OF PHOSPHORUS: CPT | Performed by: INTERNAL MEDICINE

## 2022-08-09 PROCEDURE — 94761 N-INVAS EAR/PLS OXIMETRY MLT: CPT

## 2022-08-09 PROCEDURE — 94618 PULMONARY STRESS TESTING: CPT

## 2022-08-09 PROCEDURE — 27000221 HC OXYGEN, UP TO 24 HOURS

## 2022-08-09 PROCEDURE — 97530 THERAPEUTIC ACTIVITIES: CPT

## 2022-08-09 PROCEDURE — 36415 COLL VENOUS BLD VENIPUNCTURE: CPT | Performed by: INTERNAL MEDICINE

## 2022-08-09 PROCEDURE — 99900031 HC PATIENT EDUCATION (STAT)

## 2022-08-09 PROCEDURE — 99900035 HC TECH TIME PER 15 MIN (STAT)

## 2022-08-09 PROCEDURE — 99231 SBSQ HOSP IP/OBS SF/LOW 25: CPT | Mod: ,,, | Performed by: INTERNAL MEDICINE

## 2022-08-09 RX ORDER — IPRATROPIUM BROMIDE AND ALBUTEROL SULFATE 2.5; .5 MG/3ML; MG/3ML
3 SOLUTION RESPIRATORY (INHALATION) EVERY 6 HOURS PRN
Qty: 75 ML | Refills: 2 | Status: SHIPPED | OUTPATIENT
Start: 2022-08-09 | End: 2022-09-08

## 2022-08-09 RX ADMIN — POLYETHYLENE GLYCOL 3350 17 G: 17 POWDER, FOR SOLUTION ORAL at 08:08

## 2022-08-09 RX ADMIN — LEVOTHYROXINE SODIUM 112 MCG: 0.11 TABLET ORAL at 05:08

## 2022-08-09 RX ADMIN — IPRATROPIUM BROMIDE AND ALBUTEROL SULFATE 3 ML: .5; 3 SOLUTION RESPIRATORY (INHALATION) at 07:08

## 2022-08-09 RX ADMIN — Medication 800 MG: at 08:08

## 2022-08-09 RX ADMIN — IPRATROPIUM BROMIDE AND ALBUTEROL SULFATE 3 ML: .5; 3 SOLUTION RESPIRATORY (INHALATION) at 01:08

## 2022-08-09 RX ADMIN — FAMOTIDINE 20 MG: 20 TABLET ORAL at 08:08

## 2022-08-09 RX ADMIN — DORZOLAMIDE HYDROCHLORIDE 1 DROP: 20 SOLUTION/ DROPS OPHTHALMIC at 08:08

## 2022-08-09 RX ADMIN — FLUTICASONE FUROATE AND VILANTEROL TRIFENATATE 1 PUFF: 100; 25 POWDER RESPIRATORY (INHALATION) at 07:08

## 2022-08-09 NOTE — PLAN OF CARE
Patient going home to be cared for with [facility] home care with start of care date 8/10/2022. [facility]    Discharge orders and chart reviewed with no further post-acute discharge needs identified at this time.  At this time, patient is cleared for discharge from Case Management standpoint.        08/09/22 1425   Final Note   Assessment Type Final Discharge Note   Anticipated Discharge Disposition Home-Health   Post-Acute Status   Post-Acute Authorization Home Health   Home Health Status Set-up Complete/Auth obtained   Discharge Delays None known at this time

## 2022-08-09 NOTE — DISCHARGE SUMMARY
On license of [facility] Medicine  Discharge Summary      Patient Name: Michelle Campo  MRN: [id number]  Patient Class: IP- Inpatient  Admission Date: 8/2/2022  Hospital Length of Stay: 7 days  Discharge Date and Time: 8/9/2022  2:44 PM  Attending Physician: No att. providers found   Discharging Provider: Les Schrader MD  Primary Care Provider: Lien Griffith MD      HPI:   Patient is a 87-year-old  female with prior history of CVA, essential hypertension, GERD, COPD and hyperlipidemia was brought to the ED by EMS secondary to generalized weakness and altered mental status.    She was intubated for airway protection prior to my arrival.  History is obtained from chart review and per ED providers.  She had gradually worsening weakness which progressed over the last few days.  Today while ambulating to the bathroom she became unresponsive.  She was helped to the floor by family members.  EMS was alerted who found her to be confused with initial GCS of 3.  She received naloxone with minimal improvement in her symptoms.  Upon arrival here she was found to be in profound respiratory acidosis. Received broad-spectrum antibiotics after obtaining cultures.  Repeat ABG with improvement in acidosis.      * No surgery found *      Hospital Course:   87-year-old female with history of CVA, GERD, COPD and hyperlipidemia brought by EMS secondary generalized weakness of 2 weeks and altered mental status and poor p.o. intake.  Intubated upon arrival to the ED for airway protection.  Found to be in acute hypoxic hypercapnic respiratory failure.  She was also in hypovolemic shock likely due to failure to thrive.  Fortunately patient improved remarkably, was extubated, came off pressors, made great recovery in last 3 days, was subsequently transferred to floor.  She was cleared for discharge by pulmonology.  It appears that patient carries diagnosis of COPD however at home she was not on any supplemental  oxygen or any inhalers.  Her biggest problem is extremely poor p.o. intake.  I expressed my concern to multiple family members that we may be dealing with end of life failure to thrive and if she does not eat she will end up in hospital again.  She was discharged on home oxygen set up, I prescribed nebulizer, DuoNebs.  Her outpatient antihypertensive regimen was adjusted.  I also addressed to her son to consider changing her code status to DNR.  Today upon my assessment she denies any new symptoms, sitting in a chair, is very excited to go home.  She has very good p.o. intake in last couple days.     I have seen the patient on the day of discharge and reviewed the discharge instructions as outlined below. Patient verbalized understanding and is aware to contact primary care physician or return to ED if new or worsening symptoms.        Goals of Care Treatment Preferences:  Code Status: Full Code      Consults:   Consults (From admission, onward)        Status Ordering Provider     Inpatient consult to Registered Dietitian/Nutritionist  Once        Provider:  (Not yet assigned)    Completed BRIANNA GREY     Inpatient consult to Registered Dietitian/Nutritionist  Once        Provider:  (Not yet assigned)    Completed BRIANNA GREY     Inpatient consult to Pulmonology  Once        Provider:  Brianna Grey MD    Completed CHELA ALDANA          No new Assessment & Plan notes have been filed under this hospital service since the last note was generated.  Service: Hospital Medicine    Final Active Diagnoses:    Diagnosis Date Noted POA    PRINCIPAL PROBLEM:  Acute respiratory failure with hypercapnia [J96.02] 08/02/2022 Yes    Failure to thrive in adult [R62.7] 08/07/2022 Yes    Pulmonary hypertension [I27.20] 08/07/2022 Yes    Aortic stenosis [I35.0] 08/07/2022 Yes    Mitral stenosis [I05.0] 08/07/2022 Yes    Hypertension [I10] 03/08/2013 Yes    Hyperlipidemia [E78.5] 03/08/2013 Yes     "Hypothyroidism [E03.9] 03/08/2013 Yes    COPD (chronic obstructive pulmonary disease) [J44.9] 03/08/2013 Yes      Problems Resolved During this Admission:    Diagnosis Date Noted Date Resolved POA    Hypovolemic shock [R57.1] 08/03/2022 08/07/2022 Yes       Discharged Condition: good    Disposition: Home-Health Care Svc    Follow Up:   Follow-up Information     Lien Griffith MD Follow up in 1 week(s).    Specialty: Internal Medicine  Contact information:  [address]  [facility] [address]  [phone]             Brianna Grey MD Follow up in 3 week(s).    Specialties: Pulmonary Disease, Sleep Medicine  Contact information:  [address] [address] [phone]                       Patient Instructions:      NEBULIZER FOR HOME USE     Order Specific Question Answer Comments   Height: 5' 4" (1.626 m)    Weight: 55.6 kg (122 lb 9.2 oz)    Does patient have medical equipment at home? walker, rolling    Length of need (1-99 months): 99      OXYGEN FOR HOME USE     Order Specific Question Answer Comments   Liter Flow 3    Duration Continuous    Qualifying Test Performed at: Activity    Oxygen saturation at rest 87    Oxygen saturation with activity 86    Oxygen saturation with activity on oxygen 92    Portable mode: continuous    Route nasal cannula    Device: home concentrator with portable tanks    Length of need (in months): 99 mos    Patient condition with qualifying saturation COPD    Height: 5' 4" (1.626 m)    Weight: 55.6 kg (122 lb 9.2 oz)    Alternative treatment measures have been tried or considered and deemed clinically ineffective. Yes      Ambulatory referral/consult to Outpatient Case Management   Referral Priority: Routine Referral Type: Consultation   Referral Reason: Specialty Services Required   Number of Visits Requested: 1     Ambulatory referral/consult to Home Health   Standing Status: Future   Referral Priority: Routine Referral Type: Home Health "   Referral Reason: Specialty Services Required   Requested Specialty: Home Health Services   Number of Visits Requested: 1     Diet Dysphagia Mechanical Soft     Notify your health care provider if you experience any of the following:  difficulty breathing or increased cough     Notify your health care provider if you experience any of the following:  persistent dizziness, light-headedness, or visual disturbances     Notify your health care provider if you experience any of the following:  increased confusion or weakness     Activity as tolerated       Significant Diagnostic Studies: Labs: All labs within the past 24 hours have been reviewed    Pending Diagnostic Studies:     None         Medications:  Reconciled Home Medications:      Medication List      START taking these medications    albuterol-ipratropium 2.5 mg-0.5 mg/3 mL nebulizer solution  Commonly known as: DUO-NEB  Take 3 mLs by nebulization every 6 (six) hours as needed for Wheezing or Shortness of Breath. Rescue        CHANGE how you take these medications    vitamin D 1000 units Tab  Commonly known as: VITAMIN D3  Take 1,000 Units by mouth once daily.  What changed: Another medication with the same name was removed. Continue taking this medication, and follow the directions you see here.        CONTINUE taking these medications    acetaminophen 325 MG tablet  Commonly known as: TYLENOL  Take 2 tablets (650 mg total) by mouth every 4 (four) hours as needed.     amlodipine-benazepril 2.5-10 mg 2.5-10 mg per capsule  Commonly known as: LOTREL  Take 1 capsule by mouth once daily.     aspirin 81 MG Chew  Take 1 tablet (81 mg total) by mouth once daily.     COMBIGAN 0.2-0.5 % Drop  Generic drug: brimonidine-timoloL  Place 1 drop into both eyes 2 (two) times a day.     donepeziL 10 MG tablet  Commonly known as: ARICEPT  Take 1 tablet (10 mg total) by mouth once daily.     dorzolamide 2 % ophthalmic solution  Commonly known as: TRUSOPT  Place 1 drop into both  eyes 3 (three) times daily.     latanoprost 0.005 % ophthalmic solution  Place 1 drop into both eyes every evening.     levothyroxine 112 MCG tablet  Commonly known as: SYNTHROID  Take 1 tablet (112 mcg total) by mouth once daily.     pravastatin 20 MG tablet  Commonly known as: PRAVACHOL  Take 1 tablet (20 mg total) by mouth once daily.        STOP taking these medications    hydroCHLOROthiazide 12.5 MG Tab  Commonly known as: HYDRODIURIL            Indwelling Lines/Drains at time of discharge:   Lines/Drains/Airways     None                 Time spent on the discharge of patient: 42 minutes         Les Schrader MD  Department of Hospital Medicine  [facility]

## 2022-08-09 NOTE — PT/OT/SLP PROGRESS
Occupational Therapy   Treatment    Name: Michelle Campo  MRN: [id number]  Admitting Diagnosis:  Acute respiratory failure with hypercapnia       Recommendations:     Discharge Recommendations: nursing facility, skilled  Discharge Equipment Recommendations:   (TBD)  Barriers to discharge:       Assessment:     Michelle Campo is a 87 y.o. female with a medical diagnosis of Acute respiratory failure with hypercapnia.  Performance deficits affecting function are weakness, impaired endurance, impaired self care skills, impaired functional mobility, impaired balance, gait instability, decreased safety awareness, impaired cardiopulmonary response to activity.     Rehab Prognosis:  Good; patient would benefit from acute skilled OT services to address these deficits and reach maximum level of function.       Plan:     Patient to be seen 5 x/week to address the above listed problems via self-care/home management, therapeutic activities, therapeutic exercises  · Plan of Care Expires: 09/06/22  · Plan of Care Reviewed with: patient, son    Subjective     Pain/Comfort:  · Pain Rating 1: 0/10  · Pain Rating Post-Intervention 1: 0/10    Objective:     Communicated with: nurse prior to session.  Patient found up in chair with peripheral IV, telemetry, oxygen upon OT entry to room.    General Precautions: Standard, fall   Orthopedic Precautions:N/A   Braces: N/A  Respiratory Status: Nasal cannula, flow 2 L/min     Occupational Performance:     Functional Mobility/Transfers:  · Patient completed Sit <> Stand Transfer with minimum assistance  with  rolling walker   · Patient completed Bed <> Chair Transfer using Step Transfer technique with minimum assistance with rolling walker  · Functional Mobility: ambulated 20 feet in room with CGA and RW.    [facility] 6 Click ADL:      Treatment & Education:  Role of OT, safety awareness, importance of functional mobility when d/c'd home.     Patient left up in chair with all lines intact, call button  in reach, chair alarm on and son presentEducation:      GOALS:   Multidisciplinary Problems     Occupational Therapy Goals     Not on file          Multidisciplinary Problems (Resolved)        Problem: Occupational Therapy    Goal Priority Disciplines Outcome Interventions   Occupational Therapy Goal   (Resolved)     OT, PT/OT Met    Description: Goals to be met by: 9/6/22    Patient will increase functional independence with ADLs by performing:    UE Dressing with Supervision.  LE Dressing with Supervision.  Grooming while seated with Supervision.  Toileting from bedside commode with Minimal Assistance for hygiene and clothing management.   Toilet transfer to bedside commode with Minimal Assistance.  Upper extremity exercise program x10 reps per handout, with supervision.                     Time Tracking:     OT Date of Treatment: 08/09/22  OT Start Time: 1300  OT Stop Time: 1316  OT Total Time (min): 16 min    Billable Minutes:Therapeutic Activity 16    OT/MAICO: OT          8/9/2022

## 2022-08-09 NOTE — CARE UPDATE
08/09/22 0712   Patient Assessment/Suction   Level of Consciousness (AVPU) alert   All Lung Fields Breath Sounds diminished   Skin Integrity   $ Wound Care Tech Time 15 min   Area Observed Bridge of nose   Skin Appearance without discoloration   PRE-TX-O2   O2 Device (Oxygen Therapy) nasal cannula   $ Is the patient on Low Flow Oxygen? Yes   Flow (L/min) 3   SpO2 95 %   Pulse Oximetry Type Intermittent   $ Pulse Oximetry - Multiple Charge Pulse Oximetry - Multiple   Pulse 88   Resp 15   Aerosol Therapy   $ Aerosol Therapy Charges Aerosol Treatment   Daily Review of Necessity (SVN) completed   Respiratory Treatment Status (SVN) given   Treatment Route (SVN) mask   Patient Position (SVN) semi-Munson's   Post Treatment Assessment (SVN) increased aeration   Signs of Intolerance (SVN) none   Inhaler   $ Inhaler Charges MDI (Metered Dose Inahler) Treatment   Daily Review of Necessity (Inhaler) completed   Respiratory Treatment Status (Inhaler) given   Treatment Route (Inhaler) mouthpiece   Patient Position (Inhaler) semi-Munson's   Post Treatment Assessment (Inhaler) breath sounds unchanged   Signs of Intolerance (Inhaler) none   Breath Sounds Post-Respiratory Treatment   Throughout All Fields Post-Treatment aeration increased   Post-treatment Heart Rate (beats/min) 88   Post-treatment Resp Rate (breaths/min) 15   Preset CPAP/BiPAP Settings   $ CPAP/BiPAP Daily Charge BiPAP/CPAP Daily   Education   $ Education Bronchodilator;15 min   Respiratory Evaluation   $ Care Plan Tech Time 15 min

## 2022-08-09 NOTE — NURSING
IV and tele removed. Patient received medications and oxygen bedside. Family educated on O2 settings and safety precautions including keeping it away from any open flame including the stove. Education received verbally as well as written, grand daughter at bedside with patient. Patient D/C home with family. Transported to car with staff via wheelchair.

## 2022-08-09 NOTE — CARE UPDATE
08/09/22 1101   Home Oxygen Qualification   $ Home O2 Qualification Pulmonary Stress Test/6 min walk;Tech time 15 minutes   Room Air SpO2 At Rest (!) 87 %   Room Air SpO2 During Ambulation (!) 86 %   SpO2 During Ambulation on O2 92 %   Heart Rate on O2 111 bpm   Ambulation O2 LPM 3 LPM   SpO2 Post Ambulation 96 %   Post Ambulation Heart Rate 91 bpm   Post Ambulation O2 LPM 2 LPM   Home O2 Eval Comments pt qualifies for home O2

## 2022-08-09 NOTE — PT/OT/SLP PROGRESS
Physical Therapy Treatment    Patient Name:  Michelle Campo   MRN:  [id number]    Recommendations:     Discharge Recommendations:  nursing facility, skilled   Discharge Equipment Recommendations: oxygen   Barriers to discharge: increased level of assist with care and mobility needed for safety    Assessment:     Michelle Campo is a 87 y.o. female admitted with a medical diagnosis of Acute respiratory failure with hypercapnia.  She presents with the following impairments/functional limitations:  weakness, impaired endurance, impaired self care skills, impaired functional mobility, impaired balance, gait instability, decreased coordination, impaired cognition, decreased lower extremity function, decreased safety awareness, impaired cardiopulmonary response to activity Pt found sitting up in bedside chair and agreeable to working with PT. Pt A & O x  Self/family and type of place.  Pt tolerated session fair and required Cornelia for safe mobilization during session today.Pt has scissoring with gait and also leans to right and slightly hunched posture.  Would benefit from postural training with visual feedback using mirror. Pt would benefit from acute PT during hospitalization to increase strength, endurance and safety with mobility and would benefit from gait, therex, and therapeutic activity prior to discharge home. .    Rehab Prognosis: Fair; patient would benefit from acute skilled PT services to address these deficits and reach maximum level of function.    Recent Surgery: * No surgery found *      Plan:     During this hospitalization, patient to be seen 6 x/week to address the identified rehab impairments via gait training, therapeutic activities, therapeutic exercises and progress toward the following goals:    · Plan of Care Expires:       Subjective     Chief Complaint: none  Patient/Family Comments/goals: go home   Pain/Comfort:  · Pain Rating 1: 0/10      Objective:     Communicated with RN and respiratory therapist  prior to session.  Patient found up in chair with peripheral IV, telemetry, oxygen, pulse ox (continuous) upon PT entry to room.     General Precautions: Standard, fall   Orthopedic Precautions:N/A   Braces:    Respiratory Status: Nasal cannula, flow 3L L/min     Functional Mobility:  · Transfers:     · Sit to Stand:  moderate assistance and of 2 persons with rolling walker  · Bed to Chair: moderate assistance with  rolling walker  using  Step Transfer  · Gait: x50' with RW and supplemental O2 3L      AM-PAC 6 CLICK MOBILITY  Turning over in bed (including adjusting bedclothes, sheets and blankets)?: 2  Sitting down on and standing up from a chair with arms (e.g., wheelchair, bedside commode, etc.): 2  Moving from lying on back to sitting on the side of the bed?: 2  Moving to and from a bed to a chair (including a wheelchair)?: 2  Need to walk in hospital room?: 2  Climbing 3-5 steps with a railing?: 1  Basic Mobility Total Score: 11       Therapeutic Activities and Exercises:   Pt was educated on the following: call light use, importance of OOB activity and functional mobility to negate the negative effects of prolonged bed rest during this hospitalization, safe transfers/ambulation and discharge planning recommendations/options.     Patient left up in chair with all lines intact, call button in reach, chair alarm on, RN notified and son present..    GOALS:   Multidisciplinary Problems     Physical Therapy Goals     Not on file          Multidisciplinary Problems (Resolved)        Problem: Physical Therapy    Goal Priority Disciplines Outcome Goal Variances Interventions   Physical Therapy Goal   (Resolved)     PT, PT/OT Met     Description: Goals to be met by: 22     Patient will increase functional independence with mobility by performin. Supine to sit with MInimal Assistance  2. Sit to stand transfer with Minimal Assistance  3. Bed to chair transfer with Minimal Assistance using Rolling Walker  4.  Gait  x 100 feet with Minimal Assistance using Rolling Walker.                      Time Tracking:     PT Received On: 08/09/22  PT Start Time: 1050     PT Stop Time: 1104  PT Total Time (min): 14 min     Billable Minutes: Gait Training 14    Treatment Type: Treatment  PT/PTA: PT           08/09/2022

## 2022-08-09 NOTE — PT/OT/SLP DISCHARGE
Occupational Therapy Discharge Summary    Michelle Campo  MRN: [id number]   Principal Problem: Acute respiratory failure with hypercapnia      Patient Discharged from acute Occupational Therapy on 8/9/2022.  Please refer to prior OT note dated 8/9/2022 for functional status.    Assessment:      Patient has not met goals.    Objective:     GOALS:   Multidisciplinary Problems     Occupational Therapy Goals     Not on file          Multidisciplinary Problems (Resolved)        Problem: Occupational Therapy    Goal Priority Disciplines Outcome Interventions   Occupational Therapy Goal   (Resolved)     OT, PT/OT Met    Description: Goals to be met by: 9/6/22    Patient will increase functional independence with ADLs by performing:    UE Dressing with Supervision.  LE Dressing with Supervision.  Grooming while seated with Supervision.  Toileting from bedside commode with Minimal Assistance for hygiene and clothing management.   Toilet transfer to bedside commode with Minimal Assistance.  Upper extremity exercise program x10 reps per handout, with supervision.                     Reasons for Discontinuation of Therapy Services  Transfer to alternate level of care.      Plan:     Patient Discharged to: Home with Home Health Service    8/9/2022

## 2022-08-09 NOTE — PROGRESS NOTES
Pulmonary/Critical Care  Progress Note      PATIENT NAME: Michelle Campo  MRN: [id number]  TODAY'S DATE: 2022  7:50 AM  ADMIT DATE: 2022  AGE: 87 y.o. : 1935    CONSULT REQUESTED BY: No att. providers found    REASON FOR CONSULT:   Critical care management    HPI:  The patient is an 87 year old female with failure to thrive. She was intubated for hypercapneic respiratory failure.  She also was thought to have a urinary tract infection but her urinalysis does not support this.  The patient has been declining for 2 weeks prior to the family bringing her in when she could no longer be aroused.  The patient is requiring Levophed to maintain a blood pressure.    I have spoken with the patient's daughter who states that at her baseline she is not eating well well she requires someone to bathe her and feed her because she has severe tremors.  She has been staying with her other daughter in [facility] because the [facility] daughter has COVID.  The [place] daughter told an oral in's daughter that she was not eating well and was not drinking boost and was moving less than less.     the patient is still requiring Levophed at 0.02.  She still is requiring assist-control ventilation to correct her respiratory acidosis.  She is awake and interactive but quite weak.  She is receiving enteral nutrition.  Her urine output is poor.    2022 - Was extubated yesterday and is stable but remains very weak.  She is not very talkative.    2022 - STable overnight, no new issues, remains weak and frail.  ECHO noted (AoS, MS, pulmonary hypertension), BNP better     the patient is without complaints.  Discussed with her re-intubation.  She does not wish this.  I informed her that she needs to discuss this with her daughters.  Her son states they are going to have a family meeting when they get home and work on a living well.    REVIEW OF SYSTEMS  Unobtainable    No change in the patient's Past Medical History,  Past Surgical History, Social History or Family History since admission.        VITAL SIGNS (MOST RECENT)  Temp: 97.7 °F (36.5 °C) (08/09/22 1100)  Pulse: 72 (08/09/22 0725)  Resp: 18 (08/09/22 0725)  BP: 136/85 (08/09/22 1100)  SpO2: 95 % (08/09/22 1100)    INTAKE AND OUTPUT (LAST 24 HOURS):    Intake/Output Summary (Last 24 hours) at 8/9/2022 1857  Last data filed at 8/9/2022 0825  Gross per 24 hour   Intake 60 ml   Output --   Net 60 ml       WEIGHT  Wt Readings from Last 1 Encounters:   08/09/22 55.6 kg (122 lb 9.2 oz)       PHYSICAL EXAM    GENERAL: Older patient in no distress.  HEENT: Pupils equal and reactive. Extraocular movements intact. Nose intact.   NECK: Supple.  Right IJ triple-lumen catheter.  HEART: Regular rate and rhythm. + m or gallop auscultated.  LUNGS: Clear to auscultation and percussion. Lung excursion symmetrical. No change in fremitus. No adventitial noises. Shallow and decreased a bases  ABDOMEN: Bowel sounds present. Non-tender, no masses palpated.  : Normal anatomy.  Locke with minimal concentrated urine.  EXTREMITIES: Normal muscle tone and joint movement, no cyanosis or clubbing.   LYMPHATICS: No adenopathy palpated, there is edema to the right elbow..  SKIN: Dry, intact, no lesions.   NEURO:  Awake, cranial nerves appear intact.  The patient has a significant resting tremor to her left arm.  Motor strength is 5/5 in the upper extremities.  She is 5/5 but much weaker in her lower extremities.  She is responsive  PSYCH:  Quiet      CBC LAST (LAST 24 HOURS)  No results for input(s): WBC, RBC, HGB, HCT, MCV, MCH, MCHC, RDW, PLT, MPV, GRAN, LYMPH, MONO, BASO, NRBC in the last 24 hours.    CHEMISTRY LAST (LAST 24 HOURS)  Recent Labs   Lab 08/09/22  0334      K 4.4      CO2 34*   ANIONGAP 5*   BUN 11   CREATININE 0.7   GLU 73   CALCIUM 8.5*   MG 1.9   ALBUMIN 2.3*   PROT 5.2*   ALKPHOS 31*   ALT 8*   AST 10   BILITOT 0.6       CARDIAC PROFILE (LAST 24 HOURS)  Recent Labs   Lab  08/03/22  0400 08/06/22  0440   BNP  --  874*   TROPONINI 0.034  --        LAST 7 DAYS MICROBIOLOGY   Microbiology Results (last 7 days)     Procedure Component Value Units Date/Time    Blood culture x two cultures. Draw prior to antibiotics. [[id number]] Collected: 08/02/22 1330    Order Status: Completed Specimen: Blood from Peripheral, Forearm, Left Updated: 08/07/22 1432     Blood Culture, Routine No growth after 5 days.    Narrative:      Aerobic and anaerobic    Blood culture x two cultures. Draw prior to antibiotics. [[id number]] Collected: 08/02/22 1305    Order Status: Completed Specimen: Blood from Peripheral, Antecubital, Left Updated: 08/07/22 1432     Blood Culture, Routine No growth after 5 days.    Narrative:      Aerobic and anaerobic    Culture, Respiratory with Gram Stain [[id number]] Collected: 08/02/22 1420    Order Status: Completed Specimen: Respiratory from Tracheal Aspirate Updated: 08/04/22 0821     Respiratory Culture Normal respiratory tru     Gram Stain (Respiratory) <10 epithelial cells per low power field.     Gram Stain (Respiratory) Moderate WBC's     Gram Stain (Respiratory) Many Gram positive cocci     Gram Stain (Respiratory) Few Gram negative rods     Gram Stain (Respiratory) Few Gram negative coccobacilli          MOST RECENT IMAGING  X-Ray Chest AP Portable  Chest single view    CLINICAL DATA: Hypoxia    FINDINGS: Comparison August 5. The heart and mediastinum are unchanged. Endotracheal and nasogastric tubes have been removed. Right IJ central catheter remains in place.    Prominence of the central pulmonary vasculature and bilateral interstitial opacities are unchanged. Small pleural effusions are unchanged. Bilateral apical pleural thickening is stable. There is no pneumothorax.    IMPRESSION:  1. Interval removal of endotracheal and nasogastric tubes. No other significant changes.    Electronically signed by:  Todd Padilla MD  8/6/2022 1:19 PM CDT Workstation:  [id number]  Echo  · The left ventricle is normal in size with normal systolic function.  · The estimated ejection fraction is 65%.  · Grade I left ventricular diastolic dysfunction.  · Severe right ventricular enlargement with normal right ventricular   systolic function.  · Moderate left atrial enlargement.  · Severe right atrial enlargement.  · There is mild aortic valve stenosis.  · Aortic valve area is 1.71 cm2; peak velocity is m/s; mean gradient is 9   mmHg.  · There is moderate mitral stenosis. Mitral valve area by pressure   half-time is around 1.7-1.8 cm2. Mean gradient is around 5 mm Hg at heart   rate of 84 beats per minute.  · Mild to moderate tricuspid regurgitation.  · Intermediate central venous pressure (8 mmHg).  · The estimated PA systolic pressure is 76 mmHg.  · There is pulmonary hypertension.         CURRENT VISIT EKG  Results for orders placed or performed during the hospital encounter of 08/02/22   EKG 12-lead    Narrative    Test Reason : R00.1,    Vent. Rate : 048 BPM     Atrial Rate : 048 BPM     P-R Int : 140 ms          QRS Dur : 078 ms      QT Int : 588 ms       P-R-T Axes : 073 057 261 degrees     QTc Int : 525 ms    Sinus bradycardia  Possible Left atrial enlargement  T wave abnormality, consider inferior ischemia  T wave abnormality, consider anterolateral ischemia  Prolonged QT  Abnormal ECG  When compared with ECG of 02-AUG-2022 13:54,  Significant changes have occurred    Referred By: [email or web address]   SELF           Confirmed By:        ECHOCARDIOGRAM RESULTS  No results found for this or any previous visit.        VENTILATOR INFORMATION  Oxygen Concentration (%):  [32] 32       LAST ARTERIAL BLOOD GAS  ABG on pressure support of 15, peep of 5  Recent Labs   Lab 08/07/22  0428   PH 7.349*   PO2 65*   PCO2 58.2*   HCO3 32.1*   BE 6       IMPRESSION AND PLAN  Failure to thrive, advanced age   Acute on chronic hypercapnic respiratory failure, on nasal cannula  COPD  Anemia  Pulmonary  hypertension  Valvular heart disease  Moderate hypoalbuminemia, patient remains anorectic    Stable extubated but frail and could decompensate  Up to chair  Add Ensure  Continue DuoNebs  Enoxaparin for DVT prophylaxis  Pantoprazole for GI prophylaxis  Increase activity as able  Home soon versus SNF  Code status remains inappropriate for this extremely debilitated and elderly patient

## 2022-08-09 NOTE — CARE UPDATE
08/08/22 1947   Patient Assessment/Suction   Level of Consciousness (AVPU) alert   Respiratory Effort Normal;Unlabored   Expansion/Accessory Muscles/Retractions no use of accessory muscles   All Lung Fields Breath Sounds Anterior:;diminished   Rhythm/Pattern, Respiratory unlabored   Cough Frequency infrequent   Skin Integrity   $ Wound Care Tech Time 15 min   Area Observed Cheek;Bridge of nose   Skin Appearance without discoloration   PRE-TX-O2   O2 Device (Oxygen Therapy) BiPAP   $ Is the patient on Low Flow Oxygen? Yes   Oxygen Concentration (%) 32   SpO2 96 %   Pulse Oximetry Type Intermittent   $ Pulse Oximetry - Multiple Charge Pulse Oximetry - Multiple   Pulse 74   Resp (!) 22   Aerosol Therapy   $ Aerosol Therapy Charges Aerosol Treatment   Daily Review of Necessity (SVN) completed   Respiratory Treatment Status (SVN) given   Treatment Route (SVN) in-line   Patient Position (SVN) HOB elevated   Post Treatment Assessment (SVN) increased aeration   Signs of Intolerance (SVN) none   Breath Sounds Post-Respiratory Treatment   Post-treatment Heart Rate (beats/min) 82   Post-treatment Resp Rate (breaths/min) 18   Ready to Wean/Extubation Screen   FIO2<=50 (chart decimal) 0.32   Preset CPAP/BiPAP Settings   Mode Of Delivery BiPAP   $ Initial CPAP/BiPAP Setup? No   $ Is patient using? Yes   Size of Mask Small   Sized Appropriately? Yes   Equipment Type V60   Airway Device Type small full face mask   CPAP (cm H2O) 12   Ipap 6   Set Rate (Breaths/Min) 20   ITime (sec) 1   Rise Time (sec) 3   Patient CPAP/BiPAP Settings   Timed Inspiration (Sec) 1   IPAP Rise Time (sec) 3   RR Total (Breaths/Min) 24   Tidal Volume (mL) 302   VE Minute Ventilation (L/min) 7.2 L/min   Peak Inspiratory Pressure (cm H2O) 12   TiTOT (%) 32   Total Leak (L/Min) 0   Patient Trigger - ST Mode Only (%) 98   CPAP/BiPAP Alarms   High Pressure (cm H2O) 40   Low Pressure (cm H2O) 10   Minute Ventilation (L/Min) 3   High RR (breaths/min) 40   Low  RR (breaths/min) 10   Apnea (Sec) 20   Education   $ Education Bronchodilator;DME BiPAP;15 min   Respiratory Evaluation   $ Care Plan Tech Time 15 min

## 2022-08-09 NOTE — PLAN OF CARE
Problem: Infection  Goal: Absence of Infection Signs and Symptoms  Outcome: Ongoing, Progressing     Problem: Adult Inpatient Plan of Care  Goal: Plan of Care Review  Outcome: Ongoing, Progressing  Goal: Patient-Specific Goal (Individualized)  Outcome: Ongoing, Progressing  Goal: Absence of Hospital-Acquired Illness or Injury  Outcome: Ongoing, Progressing  Goal: Optimal Comfort and Wellbeing  Outcome: Ongoing, Progressing  Goal: Readiness for Transition of Care  Outcome: Ongoing, Progressing     Problem: Diabetes Comorbidity  Goal: Blood Glucose Level Within Targeted Range  Outcome: Ongoing, Progressing     Problem: Fluid and Electrolyte Imbalance (Acute Kidney Injury/Impairment)  Goal: Fluid and Electrolyte Balance  Outcome: Ongoing, Progressing     Problem: Oral Intake Inadequate (Acute Kidney Injury/Impairment)  Goal: Optimal Nutrition Intake  Outcome: Ongoing, Progressing     Problem: Renal Function Impairment (Acute Kidney Injury/Impairment)  Goal: Effective Renal Function  Outcome: Ongoing, Progressing     Problem: Noninvasive Ventilation Acute  Goal: Effective Unassisted Ventilation and Oxygenation  Outcome: Ongoing, Progressing     Problem: Fall Injury Risk  Goal: Absence of Fall and Fall-Related Injury  Outcome: Ongoing, Progressing     Problem: Skin Injury Risk Increased  Goal: Skin Health and Integrity  Outcome: Ongoing, Progressing     Problem: Feeding Intolerance (Enteral Nutrition)  Goal: Feeding Tolerance  Outcome: Ongoing, Progressing     Problem: Malnutrition  Goal: Improved Nutritional Intake  Outcome: Ongoing, Progressing

## 2022-08-09 NOTE — PLAN OF CARE
Oxygen and nebulizer order sent to [facility] via [facility]. DME to be delivered to patient's home today. Patient to be cared for by [facility] home care, spoke with Rosmery at [facility] ([phone]) to confirm and she states start of care for home health is 8/10/2022.       08/09/22 1214   Post-Acute Status   Post-Acute Authorization Home Health   Home Health Status Set-up Complete/Auth obtained   Discharge Delays None known at this time   Discharge Plan   Discharge Plan A Home;Home with family;Home Health   Discharge Plan B Home;Home with family;Home Health

## 2022-08-09 NOTE — HOSPITAL COURSE
87-year-old female with history of CVA, GERD, COPD and hyperlipidemia brought by EMS secondary generalized weakness of 2 weeks and altered mental status and poor p.o. intake.  Intubated upon arrival to the ED for airway protection.  Found to be in acute hypoxic hypercapnic respiratory failure.  She was also in hypovolemic shock likely due to failure to thrive.  Fortunately patient improved remarkably, was extubated, came off pressors, made great recovery in last 3 days, was subsequently transferred to floor.  She was cleared for discharge by pulmonology.  It appears that patient carries diagnosis of COPD however at home she was not on any supplemental oxygen or any inhalers.  Her biggest problem is extremely poor p.o. intake.  I expressed my concern to multiple family members that we may be dealing with end of life failure to thrive and if she does not eat she will end up in hospital again.  She was discharged on home oxygen set up, I prescribed nebulizer, DuoNebs.  Her outpatient antihypertensive regimen was adjusted.  I also addressed to her son to consider changing her code status to DNR.  Today upon my assessment she denies any new symptoms, sitting in a chair, is very excited to go home.  She has very good p.o. intake in last couple days.     I have seen the patient on the day of discharge and reviewed the discharge instructions as outlined below. Patient verbalized understanding and is aware to contact primary care physician or return to ED if new or worsening symptoms.

## 2022-08-10 ENCOUNTER — OUTPATIENT CASE MANAGEMENT (OUTPATIENT)
Dept: ADMINISTRATIVE | Facility: OTHER | Age: 87
End: 2022-08-10
Payer: MEDICARE

## 2022-08-10 NOTE — LETTER
August 11, 2022    Michelle Campo  [address] [place] [address]             Outpatient Case Management  [address]  [place] [address] Dear Ms Franco,    Welcome to [facility] Complex [facility] Management [facility].  It was a pleasure talking with you today. I look forward to working with you as your Care Manager.  My goal is to help you function at the healthiest and highest level possible.  You can contact me directly at [phone] between the hours of 8:00 to 4:30, Monday through Friday.    As an [facility] patient, some of the services we may be able to provide include:      Development of an individualized care plan with a Registered Nurse    Connection with a    Connection with available resources and services     Coordinate communication among your care team members    Provide coaching and education    Help you understand your doctors treatment plan   Help you obtain information about your insurance coverage.     All services provided by [facility] Complex Care Managers and other care team members are coordinated with and communicated to your primary care team.    As part of your enrollment, you will be receiving education materials and more information about these services in your [facility] account, by phone or through the mail.  If you do not wish to participate or receive information, please contact our office at [phone].      Sincerely,        Lisa Aguilar RN  RN Case Manager  Outpatient Complex Care Management  [facility]  [phone]  [email or web address]

## 2022-08-11 NOTE — PROGRESS NOTES
Outpatient Care Management  Initial Patient Assessment    Patient: Michelle Campo  MRN: [id number]  Date of Service: 08/10/2022  Completed by: Layne Aguilar RN  Referral Date: 08/03/2022  Program: High Risk  Status: Ongoing  Effective Dates: 8/11/2022 - present  Responsible Staff: Layne Aguilar RN        Reason for Visit   Patient presents with    OPCM Chart Review     8/10/2022    Nursing Assessment    Plan Of Care    OPCM Enrollment Call    Initial Assessment       Brief Summary:  Michelle Campo was referred by Dr. Joselito Bull for acute hypercapniec respiratory failure. Patient qualifies for program based on risk score of 81.7%.   Active problem list, medical, surgical and social history reviewed. Active comorbidities include CVA, HTN, GERD, HLD, hypothyroid. Areas of need identified by patient include COPD and falls prevention.   Complex care plan created with patient/caregiver input. By next encounter, patient's daughter, Yelitza, agrees to follow up phone call in one week and to read educational materials sent to patient's home .     Next steps:   Follow up in one week  Follow up on receipt of educational materials  Follow up on receipt of hospital bed  Follow up on no obstacles or rugs in pathways    Follow up in about 9 days (around 8/19/2022).    Assessment Documentation     OPCM Initial Assessment    Involvement of Care  Do I have permission to speak with other family members about your care?: Yes  Assessment completed by: Children (Comment: daughterYelitza)  Identified Areas of Need  Advanced Care Planning: Yes  Housing: no  Medication Adherence: No  *Active medication list was reviewed and reconciled with patient and/or caregiver:   Nutrition: yes  Lab Adherence: no  Depression: No  Cognitive/Behavioral Health: yes  Communication: no  Health Literacy: Yes  Fall risk?: Yes  Equipment/Supplies/Services: no          Problem List and History     Problems Addressed This Visit    COPD: Identified as  current problem  Hypertension: Not identified by patient as current problem  Diabetes: Not identified by patient as current problem  Stroke: Not identified by patient as current problem  Anemia: Not identified by patient as current problem  Chronic Kidney Disease: Not identified by patient as current problem  Hyperlipidemia: Not identified by patient as current problem  Glaucoma: Not identified by patient as current problem  Heart Disease: Not identified by patient as current problem         Reviewed medical and social history with patient and/or caregiver. A complex care plan was discussed and completed today, with input from patient and/or caregiver.    Patient Instructions     Instructions were provided via the [employer] patient resources and are available for the patient to view on the patient portal, if active.    Todays OPCM Self-Management Care Plan was developed with the patients/caregivers input and was based on identified barriers from todays assessment.  Goals were written today with the patient/caregiver and the patient has agreed to work towards these goals to improve his/her overall well-being. Patient verbalized understanding of the care plan, goals, and all of today's instructions. Encouraged patient/caregiver to communicate with his/her physician and health care team about health conditions and the treatment plan.  Provided my contact information today and encouraged patient/caregiver to call me with any questions as needed.

## 2022-08-14 ENCOUNTER — TELEPHONE (OUTPATIENT)
Dept: INTERNAL MEDICINE | Facility: CLINIC | Age: 87
End: 2022-08-14
Payer: MEDICARE

## 2022-08-14 DIAGNOSIS — J44.9 CHRONIC OBSTRUCTIVE PULMONARY DISEASE, UNSPECIFIED COPD TYPE: Primary | ICD-10-CM

## 2022-08-14 DIAGNOSIS — G20.C PARKINSONISM, UNSPECIFIED PARKINSONISM TYPE: ICD-10-CM

## 2022-08-15 NOTE — TELEPHONE ENCOUNTER
----- Message from Jason Mcdonnell LPN sent at 8/11/2022  1:03 PM CDT -----  Regarding: FW: Hospital Bed    ----- Message -----  From: Layne Aguilar RN  Sent: 8/11/2022  12:54 PM CDT  To: [facility] Staff  Subject: Hospital Bed                                     8/11/2022     Good afternoon,    I admitted this patient to Outpatient Case Management and the daughter states they could really use a hospital bed to help take care of her mother.  Can you please place an order for a hospital bed and have staff send it to [facility]?      Your patient [id number] Michelle Campo  was  referred to [facility] by Dr. Joselito Bull.      My name is Lisa, Care Manager.  At times, it may be necessary to have a  involved in the coordination of care, their names will be added to the Care Team where appropriate.    In our enrollment encounter, the following needs were identified:  COPD, Dementia and Falls prevention.    All needs and services provided by [facility] Managers and other care team members will be communicated to you via your Resource Pool.      Our documentation can be readily accessed in the EMR using the following tabs: Chart review -> Episodes: High Risk.     It is our goal to provide holistic care, if at any time you feel other areas of concern need to be addressed, please communicate with me by Inbasket messaging.      Sincerely,    Lisa Aguilar RN  RN Case Manager  Outpatient Complex Care Management  [facility]  [phone]  [email or web address]

## 2022-08-18 ENCOUNTER — TELEPHONE (OUTPATIENT)
Dept: INTERNAL MEDICINE | Facility: CLINIC | Age: 87
End: 2022-08-18
Payer: MEDICARE

## 2022-08-18 NOTE — TELEPHONE ENCOUNTER
PT has eating a bite of a sausage biscuit. Pt is not speaking but moaning, pt is really not responding anymore upon stimulation. Her  Blood pressure has evaluate 160/80 with medicine   Not eating   Diarrhea   Oxygen at 90 while on 3L   Very lethargic   Should pt go to ER?

## 2022-08-18 NOTE — TELEPHONE ENCOUNTER
----- Message from Ivana Sinha sent at 8/18/2022 11:37 AM CDT -----  Contact: Karis---[employer] --[phone]  [email or web address]     Patient is calling for Medical Advice regarding:    Blood pressure has evaluate 160/80 with medicine   Not eating   Diarrhea   Oxygen at 90 while on 3L  Very lethargic    Would like response via [employer]:  call back     Comments:   Please call back to advise.

## 2022-08-19 ENCOUNTER — OUTPATIENT CASE MANAGEMENT (OUTPATIENT)
Dept: ADMINISTRATIVE | Facility: OTHER | Age: 87
End: 2022-08-19
Payer: MEDICARE

## 2022-08-19 ENCOUNTER — TELEPHONE (OUTPATIENT)
Dept: INTERNAL MEDICINE | Facility: CLINIC | Age: 87
End: 2022-08-19
Payer: MEDICARE

## 2022-08-19 DIAGNOSIS — G20.C PARKINSONISM, UNSPECIFIED PARKINSONISM TYPE: ICD-10-CM

## 2022-08-19 DIAGNOSIS — R62.7 FAILURE TO THRIVE IN ADULT: Primary | ICD-10-CM

## 2022-08-19 DIAGNOSIS — J44.9 CHRONIC OBSTRUCTIVE PULMONARY DISEASE, UNSPECIFIED COPD TYPE: ICD-10-CM

## 2022-08-19 NOTE — TELEPHONE ENCOUNTER
----- Message from Jason Mcdonnell LPN sent at 8/19/2022  1:34 PM CDT -----  Regarding: FW: Hospice  [facility]   ----- Message -----  From: Leatha Bonds MD  Sent: 8/19/2022  12:30 PM CDT  To: Jason Mcdonnell LPN  Subject: RE: Hospice                                      What company do they want to use?  ----- Message -----  From: Jason Mcdonnell LPN  Sent: 8/19/2022  11:52 AM CDT  To: Leatha Bonds MD  Subject: FW: Hospice                                        ----- Message -----  From: Lanye Aguilar RN  Sent: 8/19/2022  11:49 AM CDT  To: [facility] Staff  Subject: Hospice                                          Good morning,    I just spoke to the two daughters, Yelitza and Paris, who stated they are interested in having hospice set up for the patient.  I have consulted my , Natalie Esteban, to contact them about setting it up.  They would need orders for the hospice company.      Thank you,    Lisa Aguilar RN  RN Case Manager  Outpatient Complex Care Management  [facility]  [phone]  [email or web address]

## 2022-08-19 NOTE — PROGRESS NOTES
Outpatient Care Management  Plan of Care Follow Up Visit    Patient: Michelle Campo  MRN: [id number]  Date of Service: 08/19/2022  Completed by: Layne Aguilar RN  Referral Date: 08/03/2022    Reason for Visit   Patient presents with    OPCM Chart Review     8/19/2022    Follow-up     8/19/2022    Update Plan Of Care     8/19/2022       Brief Summary: Patient's daughters, Yelitza and Paris, state that the patient is not responsive.  They say that she opens her eyes upon stimulation but then closes them up again right away and doesn't speak.  CM explained to them that the PCP is suggesting to call 911 to take patient to the ED.  The daughters ask what are they going to do for her.  CM explained they would have to evaluate the patient to see what they can do.  CM asked if anyone mentioned hospice to them.  They said the home health nurse said something about it.  They said they would be interested in having hospice come in to help them with the patient for end of life assistance.  Patient has not received her hospital bed although the daughter states she received a phone call about it but they said it would take a while.    CM consulted BRIGIDA Olea, to assist with setting up home health.  CM also sent a message to Dr. Griffith's office about needing hospice orders.  Hospital bed can be provided by hospice.    Next steps:  Follow up in one week  Follow up if hospice set up    Follow up in about 1 week (around 8/26/2022).    Patient Summary     Involvement of Care:  Do I have permission to speak with other family members about your care?  Yes    Patient Reported Labs & Vitals:  1.  Any Patient Reported Labs & Vitals?  No  2.  Patient Reported Blood Pressure:     3.  Patient Reported Pulse:     4.  Patient Reported Weight (Kg):     5.  Patient Reported Blood Glucose (mg/dl):       Medical and social history was reviewed with patient and/or caregiver.     Clinical Assessment     Reviewed and provided basic  information on available community resources for mental health, transportation, wellness resources, and palliative care programs with patient and/or caregiver.     Complex Care Plan     Care plan was discussed and completed today with input from patient and/or caregiver.    Patient Instructions     Instructions were provided via the [employer] patient resources and are available for the patient to view on the patient portal.    Todays OPCM Self-Management Care Plan was developed with the patients/caregivers input and was based on identified barriers from todays assessment.  Goals were written today with the patient/caregiver and the patient has agreed to work towards these goals to improve his/her overall well-being. Patient verbalized understanding of the care plan, goals, and all of today's instructions. Encouraged patient/caregiver to communicate with his/her physician and health care team about health conditions and the treatment plan.  Provided my contact information today and encouraged patient/caregiver to call me with any questions as needed.

## 2022-08-22 ENCOUNTER — TELEPHONE (OUTPATIENT)
Dept: INTERNAL MEDICINE | Facility: CLINIC | Age: 87
End: 2022-08-22
Payer: MEDICARE

## 2022-08-22 ENCOUNTER — OUTPATIENT CASE MANAGEMENT (OUTPATIENT)
Dept: ADMINISTRATIVE | Facility: OTHER | Age: 87
End: 2022-08-22
Payer: MEDICARE

## 2022-08-22 NOTE — PROGRESS NOTES
SW received referral for possible hospice for patient. Upon chart review, hospice orders were sent to [facility] 8/19/2022. Social Work confirmed with Brionna at [facility] that pt was admitted Friday afternoon and passed away Saturday morning. Case closed.

## 2022-08-22 NOTE — TELEPHONE ENCOUNTER
----- Message from Mary Villalta sent at 8/22/2022 10:10 AM CDT -----  Contact: Brionna with [facility] [place] [address]   Brionna states the pt passed away on 08/20/22.

## 2022-08-26 ENCOUNTER — OUTPATIENT CASE MANAGEMENT (OUTPATIENT)
Dept: ADMINISTRATIVE | Facility: OTHER | Age: 87
End: 2022-08-26
Payer: MEDICARE

## 2022-08-26 NOTE — PROGRESS NOTES
Outpatient Care Management  Plan of Care Follow Up Visit    Patient: Michelle Campo  MRN: [id number]  Date of Service: 2022  Completed by: Layne Aguilar RN  Referral Date: 2022    Reason for Visit   Patient presents with    Case Closure     2022  Pt .           Patient Summary         Clinical Assessment          Complex Care Plan         Patient Instructions

## 2022-08-31 ENCOUNTER — EXTERNAL HOME HEALTH (OUTPATIENT)
Dept: HOME HEALTH SERVICES | Facility: HOSPITAL | Age: 87
End: 2022-08-31
Payer: MEDICARE

## 2022-09-26 ENCOUNTER — DOCUMENT SCAN (OUTPATIENT)
Dept: HOME HEALTH SERVICES | Facility: HOSPITAL | Age: 87
End: 2022-09-26
Payer: MEDICARE

## 2023-05-08 NOTE — TELEPHONE ENCOUNTER
----- Message from Susan Kate sent at 5/23/2022  3:08 PM CDT -----  Contact: jose miguel Torre   Jose Miguel Torre would like a call back. She would like to get a nurses aide & PT for Michelle      Former smoker

## 2024-12-25 NOTE — TELEPHONE ENCOUNTER
----- Message from Yudith Cannon sent at 12/30/2020  3:25 PM CST -----  Contact: Paris/Daughter/[phone]  .1 Patient would like to get medical advice.  Symptoms (please be specific):side pain  How long has patient had these symptoms: 2 days ago  Pharmacy name and phone#:[facility] [address], [place] - [address] [phone] (Phone) [phone] (Fax)  Any drug allergies: on file  Comments: Paris would like to get medical advice. Unable to find an open appointment provided her with address from the walking [facility] in [facility]. Thank you       Standing/Walking/Toileting
